# Patient Record
Sex: FEMALE | Race: WHITE | Employment: OTHER | URBAN - METROPOLITAN AREA
[De-identification: names, ages, dates, MRNs, and addresses within clinical notes are randomized per-mention and may not be internally consistent; named-entity substitution may affect disease eponyms.]

---

## 2017-01-01 ENCOUNTER — HOSPITAL ENCOUNTER (OUTPATIENT)
Dept: MRI IMAGING | Age: 37
Discharge: HOME | End: 2017-10-05
Attending: INTERNAL MEDICINE
Payer: MEDICARE

## 2017-01-01 DIAGNOSIS — R26.9 GAIT DISTURBANCE: ICD-10-CM

## 2017-01-01 DIAGNOSIS — Z98.2 VP (VENTRICULOPERITONEAL) SHUNT STATUS: ICD-10-CM

## 2017-01-01 DIAGNOSIS — G45.9 TRANSIENT CEREBRAL ISCHEMIA, UNSPECIFIED TYPE: ICD-10-CM

## 2017-01-01 PROCEDURE — A9577 INJ MULTIHANCE: HCPCS | Performed by: INTERNAL MEDICINE

## 2017-01-01 PROCEDURE — 74011250636 HC RX REV CODE- 250/636: Performed by: INTERNAL MEDICINE

## 2017-01-01 PROCEDURE — 70553 MRI BRAIN STEM W/O & W/DYE: CPT

## 2017-01-01 RX ORDER — SODIUM CHLORIDE 0.9 % (FLUSH) 0.9 %
10 SYRINGE (ML) INJECTION
Status: COMPLETED | OUTPATIENT
Start: 2017-01-01 | End: 2017-01-01

## 2017-01-01 RX ADMIN — GADOBENATE DIMEGLUMINE 15 ML: 529 INJECTION, SOLUTION INTRAVENOUS at 17:05

## 2017-01-01 RX ADMIN — Medication 10 ML: at 17:05

## 2017-02-07 ENCOUNTER — HOSPITAL ENCOUNTER (OUTPATIENT)
Dept: LAB | Age: 37
Discharge: HOME | End: 2017-02-07

## 2017-02-07 PROCEDURE — 88305 TISSUE EXAM BY PATHOLOGIST: CPT | Performed by: INTERNAL MEDICINE

## 2017-02-07 PROCEDURE — 88312 SPECIAL STAINS GROUP 1: CPT | Performed by: INTERNAL MEDICINE

## 2017-02-10 PROBLEM — K21.00 GERD WITH ESOPHAGITIS: Status: ACTIVE | Noted: 2017-02-10

## 2017-03-02 PROBLEM — R55 VASOVAGAL SYNCOPE: Status: ACTIVE | Noted: 2017-03-02

## 2017-03-02 PROBLEM — E03.8 SECONDARY HYPOTHYROIDISM: Status: ACTIVE | Noted: 2017-03-02

## 2017-05-04 ENCOUNTER — HOSPITAL ENCOUNTER (OUTPATIENT)
Dept: LAB | Age: 37
Discharge: HOME | End: 2017-05-04

## 2017-05-04 PROCEDURE — 88305 TISSUE EXAM BY PATHOLOGIST: CPT | Performed by: INTERNAL MEDICINE

## 2017-05-05 PROBLEM — K20.90 ESOPHAGITIS: Status: ACTIVE | Noted: 2017-05-04

## 2017-08-01 ENCOUNTER — HOSPITAL ENCOUNTER (OUTPATIENT)
Dept: ULTRASOUND IMAGING | Age: 37
Discharge: HOME | End: 2017-08-01
Attending: INTERNAL MEDICINE
Payer: MEDICARE

## 2017-08-01 DIAGNOSIS — M79.605 LEFT LEG PAIN: ICD-10-CM

## 2017-08-01 PROCEDURE — 93971 EXTREMITY STUDY: CPT

## 2017-11-15 PROBLEM — R73.9 ELEVATED BLOOD SUGAR: Status: ACTIVE | Noted: 2017-01-01

## 2017-11-15 PROBLEM — E66.9 OBESITY: Status: ACTIVE | Noted: 2017-01-01

## 2018-01-01 ENCOUNTER — HOSPITAL ENCOUNTER (OUTPATIENT)
Dept: MEDSURG UNIT | Age: 38
Discharge: HOME | End: 2018-06-22
Payer: MEDICARE

## 2018-01-01 ENCOUNTER — APPOINTMENT (OUTPATIENT)
Dept: ULTRASOUND IMAGING | Age: 38
End: 2018-01-01
Attending: EMERGENCY MEDICINE
Payer: MEDICARE

## 2018-01-01 ENCOUNTER — HOME CARE VISIT (OUTPATIENT)
Dept: SCHEDULING | Facility: HOME HEALTH | Age: 38
End: 2018-01-01
Payer: MEDICARE

## 2018-01-01 ENCOUNTER — ANESTHESIA EVENT (OUTPATIENT)
Dept: ENDOSCOPY | Age: 38
End: 2018-01-01
Payer: MEDICARE

## 2018-01-01 ENCOUNTER — APPOINTMENT (OUTPATIENT)
Dept: MRI IMAGING | Age: 38
DRG: 374 | End: 2018-01-01
Attending: EMERGENCY MEDICINE
Payer: MEDICARE

## 2018-01-01 ENCOUNTER — HOSPITAL ENCOUNTER (OUTPATIENT)
Dept: NUCLEAR MEDICINE | Age: 38
Discharge: HOME | End: 2018-05-31
Payer: MEDICARE

## 2018-01-01 ENCOUNTER — HOME CARE VISIT (OUTPATIENT)
Dept: HOSPICE | Facility: HOSPICE | Age: 38
End: 2018-01-01
Payer: MEDICARE

## 2018-01-01 ENCOUNTER — ANESTHESIA (OUTPATIENT)
Dept: ENDOSCOPY | Age: 38
End: 2018-01-01
Payer: MEDICARE

## 2018-01-01 ENCOUNTER — HOSPICE ADMISSION (OUTPATIENT)
Dept: HOSPICE | Facility: HOSPICE | Age: 38
End: 2018-01-01
Payer: MEDICARE

## 2018-01-01 ENCOUNTER — ANESTHESIA EVENT (OUTPATIENT)
Dept: ENDOSCOPY | Age: 38
DRG: 374 | End: 2018-01-01
Payer: MEDICARE

## 2018-01-01 ENCOUNTER — APPOINTMENT (OUTPATIENT)
Dept: GENERAL RADIOLOGY | Age: 38
End: 2018-01-01
Attending: EMERGENCY MEDICINE
Payer: MEDICARE

## 2018-01-01 ENCOUNTER — HOSPITAL ENCOUNTER (OUTPATIENT)
Age: 38
Setting detail: OBSERVATION
Discharge: HOME | End: 2018-06-03
Attending: EMERGENCY MEDICINE | Admitting: INTERNAL MEDICINE
Payer: MEDICARE

## 2018-01-01 ENCOUNTER — ANESTHESIA (OUTPATIENT)
Dept: ENDOSCOPY | Age: 38
DRG: 374 | End: 2018-01-01
Payer: MEDICARE

## 2018-01-01 ENCOUNTER — APPOINTMENT (OUTPATIENT)
Dept: GENERAL RADIOLOGY | Age: 38
DRG: 374 | End: 2018-01-01
Attending: INTERNAL MEDICINE
Payer: MEDICARE

## 2018-01-01 ENCOUNTER — HOSPITAL ENCOUNTER (OUTPATIENT)
Dept: ULTRASOUND IMAGING | Age: 38
Discharge: HOME | End: 2018-06-20
Payer: MEDICARE

## 2018-01-01 ENCOUNTER — APPOINTMENT (OUTPATIENT)
Dept: CT IMAGING | Age: 38
End: 2018-01-01
Attending: EMERGENCY MEDICINE
Payer: MEDICARE

## 2018-01-01 ENCOUNTER — PATIENT OUTREACH (OUTPATIENT)
Dept: CASE MANAGEMENT | Age: 38
End: 2018-01-01

## 2018-01-01 ENCOUNTER — HOSPITAL ENCOUNTER (EMERGENCY)
Age: 38
Discharge: HOME | End: 2018-07-10
Attending: EMERGENCY MEDICINE
Payer: MEDICARE

## 2018-01-01 ENCOUNTER — APPOINTMENT (OUTPATIENT)
Dept: CT IMAGING | Age: 38
DRG: 374 | End: 2018-01-01
Attending: NURSE PRACTITIONER
Payer: MEDICARE

## 2018-01-01 ENCOUNTER — HOSPITAL ENCOUNTER (INPATIENT)
Age: 38
LOS: 6 days | Discharge: HOME | DRG: 374 | End: 2018-06-15
Attending: EMERGENCY MEDICINE | Admitting: INTERNAL MEDICINE
Payer: MEDICARE

## 2018-01-01 ENCOUNTER — HOSPITAL ENCOUNTER (OUTPATIENT)
Dept: ULTRASOUND IMAGING | Age: 38
Discharge: HOME | End: 2018-04-26
Attending: INTERNAL MEDICINE
Payer: MEDICARE

## 2018-01-01 VITALS
OXYGEN SATURATION: 96 % | HEART RATE: 110 BPM | RESPIRATION RATE: 20 BRPM | SYSTOLIC BLOOD PRESSURE: 145 MMHG | WEIGHT: 161 LBS | BODY MASS INDEX: 31.61 KG/M2 | DIASTOLIC BLOOD PRESSURE: 80 MMHG | TEMPERATURE: 99.8 F | HEIGHT: 60 IN

## 2018-01-01 VITALS
WEIGHT: 146 LBS | BODY MASS INDEX: 27.56 KG/M2 | TEMPERATURE: 98 F | SYSTOLIC BLOOD PRESSURE: 179 MMHG | HEIGHT: 61 IN | OXYGEN SATURATION: 98 % | DIASTOLIC BLOOD PRESSURE: 78 MMHG | RESPIRATION RATE: 16 BRPM | HEART RATE: 100 BPM

## 2018-01-01 VITALS
SYSTOLIC BLOOD PRESSURE: 128 MMHG | WEIGHT: 144 LBS | BODY MASS INDEX: 27.19 KG/M2 | HEART RATE: 116 BPM | HEIGHT: 61 IN | DIASTOLIC BLOOD PRESSURE: 76 MMHG | OXYGEN SATURATION: 98 % | RESPIRATION RATE: 24 BRPM | TEMPERATURE: 98.1 F

## 2018-01-01 VITALS — RESPIRATION RATE: 28 BRPM | DIASTOLIC BLOOD PRESSURE: 58 MMHG | HEART RATE: 118 BPM | SYSTOLIC BLOOD PRESSURE: 128 MMHG

## 2018-01-01 VITALS — DIASTOLIC BLOOD PRESSURE: 70 MMHG | SYSTOLIC BLOOD PRESSURE: 110 MMHG | RESPIRATION RATE: 12 BRPM | HEART RATE: 120 BPM

## 2018-01-01 VITALS
OXYGEN SATURATION: 93 % | BODY MASS INDEX: 30.38 KG/M2 | TEMPERATURE: 98.7 F | RESPIRATION RATE: 18 BRPM | WEIGHT: 160.8 LBS | DIASTOLIC BLOOD PRESSURE: 68 MMHG | SYSTOLIC BLOOD PRESSURE: 137 MMHG | HEART RATE: 104 BPM

## 2018-01-01 VITALS — DIASTOLIC BLOOD PRESSURE: 52 MMHG | SYSTOLIC BLOOD PRESSURE: 100 MMHG | HEART RATE: 120 BPM | RESPIRATION RATE: 16 BRPM

## 2018-01-01 VITALS — SYSTOLIC BLOOD PRESSURE: 140 MMHG | DIASTOLIC BLOOD PRESSURE: 72 MMHG | HEART RATE: 114 BPM | RESPIRATION RATE: 12 BRPM

## 2018-01-01 VITALS
HEART RATE: 108 BPM | RESPIRATION RATE: 25 BRPM | TEMPERATURE: 97.6 F | DIASTOLIC BLOOD PRESSURE: 48 MMHG | SYSTOLIC BLOOD PRESSURE: 80 MMHG

## 2018-01-01 VITALS — HEART RATE: 104 BPM | SYSTOLIC BLOOD PRESSURE: 140 MMHG | RESPIRATION RATE: 12 BRPM | DIASTOLIC BLOOD PRESSURE: 70 MMHG

## 2018-01-01 VITALS — HEART RATE: 80 BPM | SYSTOLIC BLOOD PRESSURE: 160 MMHG | DIASTOLIC BLOOD PRESSURE: 80 MMHG | RESPIRATION RATE: 20 BRPM

## 2018-01-01 VITALS
DIASTOLIC BLOOD PRESSURE: 92 MMHG | HEART RATE: 109 BPM | RESPIRATION RATE: 22 BRPM | TEMPERATURE: 99 F | SYSTOLIC BLOOD PRESSURE: 140 MMHG

## 2018-01-01 VITALS
TEMPERATURE: 97.8 F | HEART RATE: 104 BPM | SYSTOLIC BLOOD PRESSURE: 130 MMHG | RESPIRATION RATE: 18 BRPM | DIASTOLIC BLOOD PRESSURE: 82 MMHG

## 2018-01-01 DIAGNOSIS — R97.0 ELEVATED CEA: ICD-10-CM

## 2018-01-01 DIAGNOSIS — C18.9 METASTATIC COLON CANCER IN FEMALE (HCC): ICD-10-CM

## 2018-01-01 DIAGNOSIS — R79.89 ABNORMAL LIVER FUNCTION TESTS: ICD-10-CM

## 2018-01-01 DIAGNOSIS — F42.9 OBSESSIVE-COMPULSIVE DISORDER, UNSPECIFIED TYPE: ICD-10-CM

## 2018-01-01 DIAGNOSIS — R10.11 RIGHT UPPER QUADRANT PAIN: ICD-10-CM

## 2018-01-01 DIAGNOSIS — C78.7 LIVER METASTASES (HCC): ICD-10-CM

## 2018-01-01 DIAGNOSIS — R74.8 ABNORMAL LIVER ENZYMES: ICD-10-CM

## 2018-01-01 DIAGNOSIS — M79.601 PAIN OF RIGHT UPPER EXTREMITY: ICD-10-CM

## 2018-01-01 DIAGNOSIS — F20.0 PARANOID SCHIZOPHRENIA (HCC): ICD-10-CM

## 2018-01-01 DIAGNOSIS — F81.9 COGNITIVE DEVELOPMENTAL DELAY: Chronic | ICD-10-CM

## 2018-01-01 DIAGNOSIS — Z98.2 VP (VENTRICULOPERITONEAL) SHUNT STATUS: Chronic | ICD-10-CM

## 2018-01-01 DIAGNOSIS — R17 JAUNDICE: Primary | ICD-10-CM

## 2018-01-01 DIAGNOSIS — C18.7 MALIGNANT NEOPLASM OF SIGMOID COLON (HCC): Primary | ICD-10-CM

## 2018-01-01 DIAGNOSIS — F79 INTELLECTUAL DISABILITY: Chronic | ICD-10-CM

## 2018-01-01 DIAGNOSIS — K31.84 GASTROPARESIS: ICD-10-CM

## 2018-01-01 DIAGNOSIS — K75.89 CHOLESTATIC HEPATITIS: ICD-10-CM

## 2018-01-01 DIAGNOSIS — K76.0 FATTY LIVER: ICD-10-CM

## 2018-01-01 DIAGNOSIS — E66.9 OBESITY, UNSPECIFIED CLASSIFICATION, UNSPECIFIED OBESITY TYPE, UNSPECIFIED WHETHER SERIOUS COMORBIDITY PRESENT: ICD-10-CM

## 2018-01-01 DIAGNOSIS — R14.2 BELCHING: ICD-10-CM

## 2018-01-01 DIAGNOSIS — R79.89 ELEVATED LIVER FUNCTION TESTS: ICD-10-CM

## 2018-01-01 DIAGNOSIS — R17 ELEVATED BILIRUBIN: Primary | ICD-10-CM

## 2018-01-01 DIAGNOSIS — R10.11 ABDOMINAL PAIN, RIGHT UPPER QUADRANT: ICD-10-CM

## 2018-01-01 LAB
ABO + RH BLD: NORMAL
ALBUMIN SERPL-MCNC: 1.9 G/DL (ref 3.5–5)
ALBUMIN SERPL-MCNC: 2 G/DL (ref 3.5–5)
ALBUMIN SERPL-MCNC: 2.1 G/DL (ref 3.5–5)
ALBUMIN SERPL-MCNC: 2.3 G/DL (ref 3.5–5)
ALBUMIN SERPL-MCNC: 2.4 G/DL (ref 3.5–5)
ALBUMIN SERPL-MCNC: 2.4 G/DL (ref 3.5–5)
ALBUMIN SERPL-MCNC: 2.5 G/DL (ref 3.5–5)
ALBUMIN SERPL-MCNC: 2.6 G/DL (ref 3.5–5)
ALBUMIN SERPL-MCNC: 3.2 G/DL (ref 3.5–5)
ALBUMIN/GLOB SERPL: 0.3 {RATIO} (ref 1.2–3.5)
ALBUMIN/GLOB SERPL: 0.4 {RATIO} (ref 1.2–3.5)
ALBUMIN/GLOB SERPL: 0.5 {RATIO} (ref 1.2–3.5)
ALBUMIN/GLOB SERPL: 0.6 {RATIO} (ref 1.2–3.5)
ALP SERPL-CCNC: 490 U/L (ref 50–136)
ALP SERPL-CCNC: 564 U/L (ref 50–136)
ALP SERPL-CCNC: 610 U/L (ref 50–136)
ALP SERPL-CCNC: 737 U/L (ref 50–136)
ALP SERPL-CCNC: 749 U/L (ref 50–136)
ALP SERPL-CCNC: 773 U/L (ref 50–136)
ALP SERPL-CCNC: 823 U/L (ref 50–136)
ALP SERPL-CCNC: 895 U/L (ref 50–136)
ALP SERPL-CCNC: 918 U/L (ref 50–136)
ALP SERPL-CCNC: 962 U/L (ref 50–136)
ALP SERPL-CCNC: 975 U/L (ref 50–136)
ALT SERPL-CCNC: 117 U/L (ref 12–65)
ALT SERPL-CCNC: 213 U/L (ref 12–65)
ALT SERPL-CCNC: 233 U/L (ref 12–65)
ALT SERPL-CCNC: 250 U/L (ref 12–65)
ALT SERPL-CCNC: 305 U/L (ref 12–65)
ALT SERPL-CCNC: 307 U/L (ref 12–65)
ALT SERPL-CCNC: 323 U/L (ref 12–65)
ALT SERPL-CCNC: 326 U/L (ref 12–65)
ALT SERPL-CCNC: 364 U/L (ref 12–65)
ALT SERPL-CCNC: 369 U/L (ref 12–65)
ALT SERPL-CCNC: 395 U/L (ref 12–65)
ANION GAP SERPL CALC-SCNC: 10 MMOL/L (ref 7–16)
ANION GAP SERPL CALC-SCNC: 10 MMOL/L (ref 7–16)
ANION GAP SERPL CALC-SCNC: 11 MMOL/L (ref 7–16)
ANION GAP SERPL CALC-SCNC: 12 MMOL/L (ref 7–16)
ANION GAP SERPL CALC-SCNC: 12 MMOL/L (ref 7–16)
ANION GAP SERPL CALC-SCNC: 13 MMOL/L (ref 7–16)
ANION GAP SERPL CALC-SCNC: 14 MMOL/L (ref 7–16)
ANION GAP SERPL CALC-SCNC: 9 MMOL/L (ref 7–16)
APAP SERPL-MCNC: <2 UG/ML (ref 10–30)
APPEARANCE UR: CLEAR
AST SERPL-CCNC: 142 U/L (ref 15–37)
AST SERPL-CCNC: 144 U/L (ref 15–37)
AST SERPL-CCNC: 148 U/L (ref 15–37)
AST SERPL-CCNC: 155 U/L (ref 15–37)
AST SERPL-CCNC: 179 U/L (ref 15–37)
AST SERPL-CCNC: 281 U/L (ref 15–37)
AST SERPL-CCNC: 282 U/L (ref 15–37)
AST SERPL-CCNC: 303 U/L (ref 15–37)
AST SERPL-CCNC: 304 U/L (ref 15–37)
AST SERPL-CCNC: 315 U/L (ref 15–37)
AST SERPL-CCNC: 403 U/L (ref 15–37)
ATRIAL RATE: 131 BPM
BACTERIA SPEC CULT: NORMAL
BACTERIA SPEC CULT: NORMAL
BACTERIA URNS QL MICRO: 0 /HPF
BASOPHILS # BLD: 0 K/UL (ref 0–0.2)
BASOPHILS # BLD: 0.1 K/UL (ref 0–0.2)
BASOPHILS NFR BLD: 0 % (ref 0–2)
BASOPHILS NFR BLD: 1 % (ref 0–2)
BASOPHILS NFR BLD: 2 % (ref 0–2)
BILIRUB DIRECT SERPL-MCNC: 4.7 MG/DL
BILIRUB DIRECT SERPL-MCNC: 6 MG/DL
BILIRUB DIRECT SERPL-MCNC: 7.1 MG/DL
BILIRUB DIRECT SERPL-MCNC: 7.4 MG/DL
BILIRUB SERPL-MCNC: 10.5 MG/DL (ref 0.2–1.1)
BILIRUB SERPL-MCNC: 3.1 MG/DL (ref 0.2–1.1)
BILIRUB SERPL-MCNC: 3.5 MG/DL (ref 0.2–1.1)
BILIRUB SERPL-MCNC: 3.8 MG/DL (ref 0.2–1.1)
BILIRUB SERPL-MCNC: 3.9 MG/DL (ref 0.2–1.1)
BILIRUB SERPL-MCNC: 4.6 MG/DL (ref 0.2–1.1)
BILIRUB SERPL-MCNC: 4.6 MG/DL (ref 0.2–1.1)
BILIRUB SERPL-MCNC: 5.4 MG/DL (ref 0.2–1.1)
BILIRUB SERPL-MCNC: 6.8 MG/DL (ref 0.2–1.1)
BILIRUB SERPL-MCNC: 8.2 MG/DL (ref 0.2–1.1)
BILIRUB SERPL-MCNC: 8.5 MG/DL (ref 0.2–1.1)
BILIRUB UR QL: ABNORMAL
BLD PROD TYP BPU: NORMAL
BLD PROD TYP BPU: NORMAL
BLOOD GROUP ANTIBODIES SERPL: NORMAL
BPU ID: NORMAL
BPU ID: NORMAL
BUN SERPL-MCNC: 10 MG/DL (ref 6–23)
BUN SERPL-MCNC: 10 MG/DL (ref 6–23)
BUN SERPL-MCNC: 12 MG/DL (ref 6–23)
BUN SERPL-MCNC: 14 MG/DL (ref 6–23)
BUN SERPL-MCNC: 20 MG/DL (ref 6–23)
BUN SERPL-MCNC: 27 MG/DL (ref 6–23)
BUN SERPL-MCNC: 5 MG/DL (ref 6–23)
BUN SERPL-MCNC: 6 MG/DL (ref 6–23)
BUN SERPL-MCNC: 8 MG/DL (ref 6–23)
BUN SERPL-MCNC: 9 MG/DL (ref 6–23)
CALCIUM SERPL-MCNC: 7.7 MG/DL (ref 8.3–10.4)
CALCIUM SERPL-MCNC: 7.9 MG/DL (ref 8.3–10.4)
CALCIUM SERPL-MCNC: 8.2 MG/DL (ref 8.3–10.4)
CALCIUM SERPL-MCNC: 8.5 MG/DL (ref 8.3–10.4)
CALCIUM SERPL-MCNC: 8.6 MG/DL (ref 8.3–10.4)
CALCIUM SERPL-MCNC: 8.6 MG/DL (ref 8.3–10.4)
CALCIUM SERPL-MCNC: 8.7 MG/DL (ref 8.3–10.4)
CALCIUM SERPL-MCNC: 8.7 MG/DL (ref 8.3–10.4)
CALCIUM SERPL-MCNC: 9 MG/DL (ref 8.3–10.4)
CALCIUM SERPL-MCNC: 9 MG/DL (ref 8.3–10.4)
CALCULATED P AXIS, ECG09: 69 DEGREES
CALCULATED R AXIS, ECG10: 85 DEGREES
CALCULATED T AXIS, ECG11: 39 DEGREES
CANCER AG19-9 SERPL-ACNC: 1316.4 U/ML (ref 2–37)
CASTS URNS QL MICRO: ABNORMAL /LPF
CEA SERPL-MCNC: 25.8 NG/ML (ref 0–3)
CHLORIDE SERPL-SCNC: 100 MMOL/L (ref 98–107)
CHLORIDE SERPL-SCNC: 101 MMOL/L (ref 98–107)
CHLORIDE SERPL-SCNC: 101 MMOL/L (ref 98–107)
CHLORIDE SERPL-SCNC: 103 MMOL/L (ref 98–107)
CHLORIDE SERPL-SCNC: 104 MMOL/L (ref 98–107)
CHLORIDE SERPL-SCNC: 104 MMOL/L (ref 98–107)
CHLORIDE SERPL-SCNC: 105 MMOL/L (ref 98–107)
CHLORIDE SERPL-SCNC: 105 MMOL/L (ref 98–107)
CHLORIDE SERPL-SCNC: 106 MMOL/L (ref 98–107)
CHLORIDE SERPL-SCNC: 99 MMOL/L (ref 98–107)
CO2 SERPL-SCNC: 23 MMOL/L (ref 21–32)
CO2 SERPL-SCNC: 24 MMOL/L (ref 21–32)
CO2 SERPL-SCNC: 25 MMOL/L (ref 21–32)
CO2 SERPL-SCNC: 25 MMOL/L (ref 21–32)
CO2 SERPL-SCNC: 26 MMOL/L (ref 21–32)
CO2 SERPL-SCNC: 27 MMOL/L (ref 21–32)
COLOR UR: ABNORMAL
CREAT SERPL-MCNC: 0.55 MG/DL (ref 0.6–1)
CREAT SERPL-MCNC: 0.59 MG/DL (ref 0.6–1)
CREAT SERPL-MCNC: 0.61 MG/DL (ref 0.6–1)
CREAT SERPL-MCNC: 0.66 MG/DL (ref 0.6–1)
CREAT SERPL-MCNC: 0.67 MG/DL (ref 0.6–1)
CREAT SERPL-MCNC: 0.68 MG/DL (ref 0.6–1)
CREAT SERPL-MCNC: 0.76 MG/DL (ref 0.6–1)
CREAT SERPL-MCNC: 0.92 MG/DL (ref 0.6–1)
CROSSMATCH RESULT,%XM: NORMAL
CROSSMATCH RESULT,%XM: NORMAL
DIAGNOSIS, 93000: NORMAL
DIFFERENTIAL METHOD BLD: ABNORMAL
EOSINOPHIL # BLD: 0 K/UL (ref 0–0.8)
EOSINOPHIL # BLD: 0.1 K/UL (ref 0–0.8)
EOSINOPHIL # BLD: 0.2 K/UL (ref 0–0.8)
EOSINOPHIL # BLD: 0.4 K/UL (ref 0–0.8)
EOSINOPHIL # BLD: 0.5 K/UL (ref 0–0.8)
EOSINOPHIL NFR BLD: 0 % (ref 0.5–7.8)
EOSINOPHIL NFR BLD: 1 % (ref 0.5–7.8)
EOSINOPHIL NFR BLD: 2 % (ref 0.5–7.8)
EOSINOPHIL NFR BLD: 3 % (ref 0.5–7.8)
EOSINOPHIL NFR BLD: 4 % (ref 0.5–7.8)
EOSINOPHIL NFR BLD: 4 % (ref 0.5–7.8)
EOSINOPHIL NFR BLD: 5 % (ref 0.5–7.8)
EOSINOPHIL NFR BLD: 5 % (ref 0.5–7.8)
EOSINOPHIL NFR BLD: 6 % (ref 0.5–7.8)
EPI CELLS #/AREA URNS HPF: 0 /HPF
ERYTHROCYTE [DISTWIDTH] IN BLOOD BY AUTOMATED COUNT: 16.2 % (ref 11.9–14.6)
ERYTHROCYTE [DISTWIDTH] IN BLOOD BY AUTOMATED COUNT: 16.3 % (ref 11.9–14.6)
ERYTHROCYTE [DISTWIDTH] IN BLOOD BY AUTOMATED COUNT: 16.5 % (ref 11.9–14.6)
ERYTHROCYTE [DISTWIDTH] IN BLOOD BY AUTOMATED COUNT: 16.8 % (ref 11.9–14.6)
ERYTHROCYTE [DISTWIDTH] IN BLOOD BY AUTOMATED COUNT: 18.3 % (ref 11.9–14.6)
ERYTHROCYTE [DISTWIDTH] IN BLOOD BY AUTOMATED COUNT: 19.3 % (ref 11.9–14.6)
ERYTHROCYTE [DISTWIDTH] IN BLOOD BY AUTOMATED COUNT: 19.6 % (ref 11.9–14.6)
ERYTHROCYTE [DISTWIDTH] IN BLOOD BY AUTOMATED COUNT: 19.9 % (ref 11.9–14.6)
ERYTHROCYTE [DISTWIDTH] IN BLOOD BY AUTOMATED COUNT: 20.1 % (ref 11.9–14.6)
ERYTHROCYTE [DISTWIDTH] IN BLOOD BY AUTOMATED COUNT: 20.4 % (ref 11.9–14.6)
EST. AVERAGE GLUCOSE BLD GHB EST-MCNC: 123 MG/DL
FOLATE SERPL-MCNC: 49.5 NG/ML (ref 3.1–17.5)
GLOBULIN SER CALC-MCNC: 4.2 G/DL (ref 2.3–3.5)
GLOBULIN SER CALC-MCNC: 4.2 G/DL (ref 2.3–3.5)
GLOBULIN SER CALC-MCNC: 4.7 G/DL (ref 2.3–3.5)
GLOBULIN SER CALC-MCNC: 4.8 G/DL (ref 2.3–3.5)
GLOBULIN SER CALC-MCNC: 4.9 G/DL (ref 2.3–3.5)
GLOBULIN SER CALC-MCNC: 5 G/DL (ref 2.3–3.5)
GLOBULIN SER CALC-MCNC: 5.3 G/DL (ref 2.3–3.5)
GLOBULIN SER CALC-MCNC: 6.1 G/DL (ref 2.3–3.5)
GLOBULIN SER CALC-MCNC: 6.4 G/DL (ref 2.3–3.5)
GLUCOSE BLD STRIP.AUTO-MCNC: 100 MG/DL (ref 65–100)
GLUCOSE BLD STRIP.AUTO-MCNC: 102 MG/DL (ref 65–100)
GLUCOSE BLD STRIP.AUTO-MCNC: 104 MG/DL (ref 65–100)
GLUCOSE BLD STRIP.AUTO-MCNC: 105 MG/DL (ref 65–100)
GLUCOSE BLD STRIP.AUTO-MCNC: 106 MG/DL (ref 65–100)
GLUCOSE BLD STRIP.AUTO-MCNC: 106 MG/DL (ref 65–100)
GLUCOSE BLD STRIP.AUTO-MCNC: 107 MG/DL (ref 65–100)
GLUCOSE BLD STRIP.AUTO-MCNC: 107 MG/DL (ref 65–100)
GLUCOSE BLD STRIP.AUTO-MCNC: 108 MG/DL (ref 65–100)
GLUCOSE BLD STRIP.AUTO-MCNC: 108 MG/DL (ref 65–100)
GLUCOSE BLD STRIP.AUTO-MCNC: 109 MG/DL (ref 65–100)
GLUCOSE BLD STRIP.AUTO-MCNC: 112 MG/DL (ref 65–100)
GLUCOSE BLD STRIP.AUTO-MCNC: 112 MG/DL (ref 65–100)
GLUCOSE BLD STRIP.AUTO-MCNC: 115 MG/DL (ref 65–100)
GLUCOSE BLD STRIP.AUTO-MCNC: 116 MG/DL (ref 65–100)
GLUCOSE BLD STRIP.AUTO-MCNC: 119 MG/DL (ref 65–100)
GLUCOSE BLD STRIP.AUTO-MCNC: 119 MG/DL (ref 65–100)
GLUCOSE BLD STRIP.AUTO-MCNC: 120 MG/DL (ref 65–100)
GLUCOSE BLD STRIP.AUTO-MCNC: 122 MG/DL (ref 65–100)
GLUCOSE BLD STRIP.AUTO-MCNC: 123 MG/DL (ref 65–100)
GLUCOSE BLD STRIP.AUTO-MCNC: 125 MG/DL (ref 65–100)
GLUCOSE BLD STRIP.AUTO-MCNC: 125 MG/DL (ref 65–100)
GLUCOSE BLD STRIP.AUTO-MCNC: 133 MG/DL (ref 65–100)
GLUCOSE BLD STRIP.AUTO-MCNC: 139 MG/DL (ref 65–100)
GLUCOSE BLD STRIP.AUTO-MCNC: 87 MG/DL (ref 65–100)
GLUCOSE BLD STRIP.AUTO-MCNC: 89 MG/DL (ref 65–100)
GLUCOSE BLD STRIP.AUTO-MCNC: 89 MG/DL (ref 65–100)
GLUCOSE BLD STRIP.AUTO-MCNC: 92 MG/DL (ref 65–100)
GLUCOSE BLD STRIP.AUTO-MCNC: 94 MG/DL (ref 65–100)
GLUCOSE BLD STRIP.AUTO-MCNC: 96 MG/DL (ref 65–100)
GLUCOSE BLD STRIP.AUTO-MCNC: 97 MG/DL (ref 65–100)
GLUCOSE SERPL-MCNC: 103 MG/DL (ref 65–100)
GLUCOSE SERPL-MCNC: 104 MG/DL (ref 65–100)
GLUCOSE SERPL-MCNC: 105 MG/DL (ref 65–100)
GLUCOSE SERPL-MCNC: 107 MG/DL (ref 65–100)
GLUCOSE SERPL-MCNC: 111 MG/DL (ref 65–100)
GLUCOSE SERPL-MCNC: 129 MG/DL (ref 65–100)
GLUCOSE SERPL-MCNC: 167 MG/DL (ref 65–100)
GLUCOSE SERPL-MCNC: 91 MG/DL (ref 65–100)
GLUCOSE SERPL-MCNC: 92 MG/DL (ref 65–100)
GLUCOSE SERPL-MCNC: 95 MG/DL (ref 65–100)
GLUCOSE UR STRIP.AUTO-MCNC: NEGATIVE MG/DL
HBA1C MFR BLD: 5.9 % (ref 4.8–6)
HCT VFR BLD AUTO: 23 % (ref 35.8–46.3)
HCT VFR BLD AUTO: 23.5 % (ref 35.8–46.3)
HCT VFR BLD AUTO: 25.2 % (ref 35.8–46.3)
HCT VFR BLD AUTO: 27.4 % (ref 35.8–46.3)
HCT VFR BLD AUTO: 27.8 % (ref 35.8–46.3)
HCT VFR BLD AUTO: 28.5 % (ref 35.8–46.3)
HCT VFR BLD AUTO: 28.6 % (ref 35.8–46.3)
HCT VFR BLD AUTO: 30.4 % (ref 35.8–46.3)
HCT VFR BLD AUTO: 30.4 % (ref 35.8–46.3)
HCT VFR BLD AUTO: 30.7 % (ref 35.8–46.3)
HCT VFR BLD AUTO: 34.2 % (ref 35.8–46.3)
HCT VFR BLD AUTO: 36.1 % (ref 35.8–46.3)
HCT VFR BLD AUTO: 37.4 % (ref 35.8–46.3)
HEMOCCULT STL QL: POSITIVE
HGB BLD-MCNC: 10.1 G/DL (ref 11.7–15.4)
HGB BLD-MCNC: 10.1 G/DL (ref 11.7–15.4)
HGB BLD-MCNC: 11.4 G/DL (ref 11.7–15.4)
HGB BLD-MCNC: 12.5 G/DL (ref 11.7–15.4)
HGB BLD-MCNC: 12.7 G/DL (ref 11.7–15.4)
HGB BLD-MCNC: 7.4 G/DL (ref 11.7–15.4)
HGB BLD-MCNC: 7.4 G/DL (ref 11.7–15.4)
HGB BLD-MCNC: 8 G/DL (ref 11.7–15.4)
HGB BLD-MCNC: 8.8 G/DL (ref 11.7–15.4)
HGB BLD-MCNC: 8.8 G/DL (ref 11.7–15.4)
HGB BLD-MCNC: 9 G/DL (ref 11.7–15.4)
HGB BLD-MCNC: 9.3 G/DL (ref 11.7–15.4)
HGB BLD-MCNC: 9.9 G/DL (ref 11.7–15.4)
HGB UR QL STRIP: NEGATIVE
IMM GRANULOCYTES # BLD: 0.1 K/UL (ref 0–0.5)
IMM GRANULOCYTES # BLD: 0.2 K/UL (ref 0–0.5)
IMM GRANULOCYTES NFR BLD AUTO: 1 % (ref 0–5)
IMM GRANULOCYTES NFR BLD AUTO: 2 % (ref 0–5)
INR PPP: 1.2
INR PPP: 1.4
KETONES UR QL STRIP.AUTO: NEGATIVE MG/DL
LEUKOCYTE ESTERASE UR QL STRIP.AUTO: ABNORMAL
LIPASE SERPL-CCNC: 41 U/L (ref 73–393)
LIPASE SERPL-CCNC: 42 U/L (ref 73–393)
LIPASE SERPL-CCNC: 51 U/L (ref 73–393)
LYMPHOCYTES # BLD: 2 K/UL (ref 0.5–4.6)
LYMPHOCYTES # BLD: 2.1 K/UL (ref 0.5–4.6)
LYMPHOCYTES # BLD: 2.3 K/UL (ref 0.5–4.6)
LYMPHOCYTES # BLD: 2.4 K/UL (ref 0.5–4.6)
LYMPHOCYTES # BLD: 2.5 K/UL (ref 0.5–4.6)
LYMPHOCYTES # BLD: 2.7 K/UL (ref 0.5–4.6)
LYMPHOCYTES # BLD: 2.9 K/UL (ref 0.5–4.6)
LYMPHOCYTES # BLD: 2.9 K/UL (ref 0.5–4.6)
LYMPHOCYTES # BLD: 3.6 K/UL (ref 0.5–4.6)
LYMPHOCYTES NFR BLD: 20 % (ref 13–44)
LYMPHOCYTES NFR BLD: 21 % (ref 13–44)
LYMPHOCYTES NFR BLD: 24 % (ref 13–44)
LYMPHOCYTES NFR BLD: 25 % (ref 13–44)
LYMPHOCYTES NFR BLD: 26 % (ref 13–44)
LYMPHOCYTES NFR BLD: 28 % (ref 13–44)
LYMPHOCYTES NFR BLD: 32 % (ref 13–44)
LYMPHOCYTES NFR BLD: 34 % (ref 13–44)
LYMPHOCYTES NFR BLD: 42 % (ref 13–44)
MAGNESIUM SERPL-MCNC: 1.7 MG/DL (ref 1.8–2.4)
MAGNESIUM SERPL-MCNC: 1.9 MG/DL (ref 1.8–2.4)
MAGNESIUM SERPL-MCNC: 2.5 MG/DL (ref 1.8–2.4)
MCH RBC QN AUTO: 25.7 PG (ref 26.1–32.9)
MCH RBC QN AUTO: 25.7 PG (ref 26.1–32.9)
MCH RBC QN AUTO: 26.1 PG (ref 26.1–32.9)
MCH RBC QN AUTO: 27.2 PG (ref 26.1–32.9)
MCH RBC QN AUTO: 27.2 PG (ref 26.1–32.9)
MCH RBC QN AUTO: 27.4 PG (ref 26.1–32.9)
MCH RBC QN AUTO: 27.8 PG (ref 26.1–32.9)
MCH RBC QN AUTO: 27.9 PG (ref 26.1–32.9)
MCH RBC QN AUTO: 27.9 PG (ref 26.1–32.9)
MCH RBC QN AUTO: 28.3 PG (ref 26.1–32.9)
MCHC RBC AUTO-ENTMCNC: 30.9 G/DL (ref 31.4–35)
MCHC RBC AUTO-ENTMCNC: 31.5 G/DL (ref 31.4–35)
MCHC RBC AUTO-ENTMCNC: 31.7 G/DL (ref 31.4–35)
MCHC RBC AUTO-ENTMCNC: 32.1 G/DL (ref 31.4–35)
MCHC RBC AUTO-ENTMCNC: 32.2 G/DL (ref 31.4–35)
MCHC RBC AUTO-ENTMCNC: 32.5 G/DL (ref 31.4–35)
MCHC RBC AUTO-ENTMCNC: 33.2 G/DL (ref 31.4–35)
MCHC RBC AUTO-ENTMCNC: 33.2 G/DL (ref 31.4–35)
MCHC RBC AUTO-ENTMCNC: 33.3 G/DL (ref 31.4–35)
MCHC RBC AUTO-ENTMCNC: 34.6 G/DL (ref 31.4–35)
MCV RBC AUTO: 80.8 FL (ref 79.6–97.8)
MCV RBC AUTO: 81 FL (ref 79.6–97.8)
MCV RBC AUTO: 81.6 FL (ref 79.6–97.8)
MCV RBC AUTO: 81.7 FL (ref 79.6–97.8)
MCV RBC AUTO: 83.6 FL (ref 79.6–97.8)
MCV RBC AUTO: 83.7 FL (ref 79.6–97.8)
MCV RBC AUTO: 84 FL (ref 79.6–97.8)
MCV RBC AUTO: 84.4 FL (ref 79.6–97.8)
MCV RBC AUTO: 84.8 FL (ref 79.6–97.8)
MCV RBC AUTO: 88 FL (ref 79.6–97.8)
MONOCYTES # BLD: 0.5 K/UL (ref 0.1–1.3)
MONOCYTES # BLD: 0.6 K/UL (ref 0.1–1.3)
MONOCYTES # BLD: 0.7 K/UL (ref 0.1–1.3)
MONOCYTES # BLD: 0.7 K/UL (ref 0.1–1.3)
MONOCYTES NFR BLD: 5 % (ref 4–12)
MONOCYTES NFR BLD: 5 % (ref 4–12)
MONOCYTES NFR BLD: 6 % (ref 4–12)
MONOCYTES NFR BLD: 7 % (ref 4–12)
MONOCYTES NFR BLD: 8 % (ref 4–12)
NEUTS SEG # BLD: 3.8 K/UL (ref 1.7–8.2)
NEUTS SEG # BLD: 4.4 K/UL (ref 1.7–8.2)
NEUTS SEG # BLD: 5 K/UL (ref 1.7–8.2)
NEUTS SEG # BLD: 6.2 K/UL (ref 1.7–8.2)
NEUTS SEG # BLD: 7.1 K/UL (ref 1.7–8.2)
NEUTS SEG # BLD: 7.3 K/UL (ref 1.7–8.2)
NEUTS SEG # BLD: 8.1 K/UL (ref 1.7–8.2)
NEUTS SEG NFR BLD: 43 % (ref 43–78)
NEUTS SEG NFR BLD: 54 % (ref 43–78)
NEUTS SEG NFR BLD: 56 % (ref 43–78)
NEUTS SEG NFR BLD: 56 % (ref 43–78)
NEUTS SEG NFR BLD: 58 % (ref 43–78)
NEUTS SEG NFR BLD: 63 % (ref 43–78)
NEUTS SEG NFR BLD: 66 % (ref 43–78)
NEUTS SEG NFR BLD: 69 % (ref 43–78)
NEUTS SEG NFR BLD: 74 % (ref 43–78)
NITRITE UR QL STRIP.AUTO: NEGATIVE
P-R INTERVAL, ECG05: 118 MS
PH UR STRIP: 6.5 [PH] (ref 5–9)
PLATELET # BLD AUTO: 275 K/UL (ref 150–450)
PLATELET # BLD AUTO: 307 K/UL (ref 150–450)
PLATELET # BLD AUTO: 310 K/UL (ref 150–450)
PLATELET # BLD AUTO: 386 K/UL (ref 150–450)
PLATELET # BLD AUTO: 431 K/UL (ref 150–450)
PLATELET # BLD AUTO: 436 K/UL (ref 150–450)
PLATELET # BLD AUTO: 440 K/UL (ref 150–450)
PLATELET # BLD AUTO: 464 K/UL (ref 150–450)
PLATELET # BLD AUTO: 484 K/UL (ref 150–450)
PLATELET # BLD AUTO: 508 K/UL (ref 150–450)
PLATELET COMMENTS,PCOM: ADEQUATE
PMV BLD AUTO: 10 FL (ref 10.8–14.1)
PMV BLD AUTO: 10 FL (ref 10.8–14.1)
PMV BLD AUTO: 10.1 FL (ref 10.8–14.1)
PMV BLD AUTO: 10.4 FL (ref 10.8–14.1)
PMV BLD AUTO: 10.4 FL (ref 10.8–14.1)
PMV BLD AUTO: 10.5 FL (ref 10.8–14.1)
PMV BLD AUTO: 10.6 FL (ref 10.8–14.1)
PMV BLD AUTO: 10.9 FL (ref 10.8–14.1)
PMV BLD AUTO: 11.1 FL (ref 10.8–14.1)
PMV BLD AUTO: 11.5 FL (ref 10.8–14.1)
POTASSIUM SERPL-SCNC: 2.8 MMOL/L (ref 3.5–5.1)
POTASSIUM SERPL-SCNC: 3.3 MMOL/L (ref 3.5–5.1)
POTASSIUM SERPL-SCNC: 3.4 MMOL/L (ref 3.5–5.1)
POTASSIUM SERPL-SCNC: 3.5 MMOL/L (ref 3.5–5.1)
POTASSIUM SERPL-SCNC: 3.8 MMOL/L (ref 3.5–5.1)
POTASSIUM SERPL-SCNC: 3.9 MMOL/L (ref 3.5–5.1)
POTASSIUM SERPL-SCNC: 5.2 MMOL/L (ref 3.5–5.1)
POTASSIUM SERPL-SCNC: 5.5 MMOL/L (ref 3.5–5.1)
PROT SERPL-MCNC: 6.6 G/DL (ref 6.3–8.2)
PROT SERPL-MCNC: 6.6 G/DL (ref 6.3–8.2)
PROT SERPL-MCNC: 6.8 G/DL (ref 6.3–8.2)
PROT SERPL-MCNC: 6.9 G/DL (ref 6.3–8.2)
PROT SERPL-MCNC: 7 G/DL (ref 6.3–8.2)
PROT SERPL-MCNC: 7.1 G/DL (ref 6.3–8.2)
PROT SERPL-MCNC: 7.3 G/DL (ref 6.3–8.2)
PROT SERPL-MCNC: 7.3 G/DL (ref 6.3–8.2)
PROT SERPL-MCNC: 8.3 G/DL (ref 6.3–8.2)
PROT SERPL-MCNC: 8.4 G/DL (ref 6.3–8.2)
PROT SERPL-MCNC: 8.5 G/DL (ref 6.3–8.2)
PROT UR STRIP-MCNC: ABNORMAL MG/DL
PROTHROMBIN TIME: 14.8 SEC (ref 11.5–14.5)
PROTHROMBIN TIME: 16.4 SEC (ref 11.5–14.5)
Q-T INTERVAL, ECG07: 324 MS
QRS DURATION, ECG06: 70 MS
QTC CALCULATION (BEZET), ECG08: 478 MS
RBC # BLD AUTO: 2.88 M/UL (ref 4.05–5.25)
RBC # BLD AUTO: 3.11 M/UL (ref 4.05–5.25)
RBC # BLD AUTO: 3.23 M/UL (ref 4.05–5.25)
RBC # BLD AUTO: 3.24 M/UL (ref 4.05–5.25)
RBC # BLD AUTO: 3.39 M/UL (ref 4.05–5.25)
RBC # BLD AUTO: 3.62 M/UL (ref 4.05–5.25)
RBC # BLD AUTO: 3.63 M/UL (ref 4.05–5.25)
RBC # BLD AUTO: 3.8 M/UL (ref 4.05–5.25)
RBC # BLD AUTO: 4.09 M/UL (ref 4.05–5.25)
RBC # BLD AUTO: 4.42 M/UL (ref 4.05–5.25)
RBC #/AREA URNS HPF: ABNORMAL /HPF
RBC MORPH BLD: ABNORMAL
SERVICE CMNT-IMP: NORMAL
SERVICE CMNT-IMP: NORMAL
SODIUM SERPL-SCNC: 137 MMOL/L (ref 136–145)
SODIUM SERPL-SCNC: 139 MMOL/L (ref 136–145)
SODIUM SERPL-SCNC: 139 MMOL/L (ref 136–145)
SODIUM SERPL-SCNC: 140 MMOL/L (ref 136–145)
SODIUM SERPL-SCNC: 140 MMOL/L (ref 136–145)
SODIUM SERPL-SCNC: 142 MMOL/L (ref 136–145)
SP GR UR REFRACTOMETRY: 1.02 (ref 1–1.02)
SPECIMEN EXP DATE BLD: NORMAL
STATUS OF UNIT,%ST: NORMAL
STATUS OF UNIT,%ST: NORMAL
TROPONIN I SERPL-MCNC: <0.02 NG/ML (ref 0.02–0.05)
TSH SERPL DL<=0.005 MIU/L-ACNC: 0.01 UIU/ML (ref 0.36–3.74)
UNIT DIVISION, %UDIV: 0
UNIT DIVISION, %UDIV: 0
UROBILINOGEN UR QL STRIP.AUTO: 0.2 EU/DL (ref 0.2–1)
VENTRICULAR RATE, ECG03: 131 BPM
VIT B12 SERPL-MCNC: 1143 PG/ML (ref 193–986)
WBC # BLD AUTO: 10.1 K/UL (ref 4.3–11.1)
WBC # BLD AUTO: 12.1 K/UL (ref 4.3–11.1)
WBC # BLD AUTO: 7.9 K/UL (ref 4.3–11.1)
WBC # BLD AUTO: 8.5 K/UL (ref 4.3–11.1)
WBC # BLD AUTO: 8.6 K/UL (ref 4.3–11.1)
WBC # BLD AUTO: 8.6 K/UL (ref 4.3–11.1)
WBC # BLD AUTO: 8.8 K/UL (ref 4.3–11.1)
WBC # BLD AUTO: 9.1 K/UL (ref 4.3–11.1)
WBC # BLD AUTO: 9.9 K/UL (ref 4.3–11.1)
WBC # BLD AUTO: 9.9 K/UL (ref 4.3–11.1)
WBC MORPH BLD: ABNORMAL
WBC URNS QL MICRO: ABNORMAL /HPF

## 2018-01-01 PROCEDURE — G0299 HHS/HOSPICE OF RN EA 15 MIN: HCPCS

## 2018-01-01 PROCEDURE — 77030036933 HC BRSH RX CYTO WREGD BSC -C: Performed by: INTERNAL MEDICINE

## 2018-01-01 PROCEDURE — 82378 CARCINOEMBRYONIC ANTIGEN: CPT | Performed by: INTERNAL MEDICINE

## 2018-01-01 PROCEDURE — HOSPICE MEDICATION HC HH HOSPICE MEDICATION

## 2018-01-01 PROCEDURE — A9541 TC99M SULFUR COLLOID: HCPCS

## 2018-01-01 PROCEDURE — 0651 HSPC ROUTINE HOME CARE

## 2018-01-01 PROCEDURE — 76040000026: Performed by: INTERNAL MEDICINE

## 2018-01-01 PROCEDURE — 80048 BASIC METABOLIC PNL TOTAL CA: CPT | Performed by: INTERNAL MEDICINE

## 2018-01-01 PROCEDURE — 74011250637 HC RX REV CODE- 250/637: Performed by: INTERNAL MEDICINE

## 2018-01-01 PROCEDURE — 77030020263 HC SOL INJ SOD CL0.9% LFCR 1000ML

## 2018-01-01 PROCEDURE — 74011250636 HC RX REV CODE- 250/636: Performed by: INTERNAL MEDICINE

## 2018-01-01 PROCEDURE — 85025 COMPLETE CBC W/AUTO DIFF WBC: CPT | Performed by: EMERGENCY MEDICINE

## 2018-01-01 PROCEDURE — G0155 HHCP-SVS OF CSW,EA 15 MIN: HCPCS

## 2018-01-01 PROCEDURE — A9270 NON-COVERED ITEM OR SERVICE: HCPCS

## 2018-01-01 PROCEDURE — 80307 DRUG TEST PRSMV CHEM ANLYZR: CPT | Performed by: INTERNAL MEDICINE

## 2018-01-01 PROCEDURE — 74011250637 HC RX REV CODE- 250/637: Performed by: FAMILY MEDICINE

## 2018-01-01 PROCEDURE — 65270000029 HC RM PRIVATE

## 2018-01-01 PROCEDURE — 77030007288 HC DEV LOK BILI BSC -A: Performed by: INTERNAL MEDICINE

## 2018-01-01 PROCEDURE — BF131ZZ FLUOROSCOPY OF GALLBLADDER AND BILE DUCTS USING LOW OSMOLAR CONTRAST: ICD-10-PCS | Performed by: INTERNAL MEDICINE

## 2018-01-01 PROCEDURE — 99212 OFFICE O/P EST SF 10 MIN: CPT

## 2018-01-01 PROCEDURE — 88112 CYTOPATH CELL ENHANCE TECH: CPT | Performed by: INTERNAL MEDICINE

## 2018-01-01 PROCEDURE — 80053 COMPREHEN METABOLIC PANEL: CPT | Performed by: INTERNAL MEDICINE

## 2018-01-01 PROCEDURE — 74011000258 HC RX REV CODE- 258: Performed by: NURSE PRACTITIONER

## 2018-01-01 PROCEDURE — 86301 IMMUNOASSAY TUMOR CA 19-9: CPT | Performed by: INTERNAL MEDICINE

## 2018-01-01 PROCEDURE — G0156 HHCP-SVS OF AIDE,EA 15 MIN: HCPCS

## 2018-01-01 PROCEDURE — 65390000012 HC CONDITION CODE 44 OBSERVATION

## 2018-01-01 PROCEDURE — 77030012893

## 2018-01-01 PROCEDURE — 99223 1ST HOSP IP/OBS HIGH 75: CPT | Performed by: INTERNAL MEDICINE

## 2018-01-01 PROCEDURE — 82607 VITAMIN B-12: CPT | Performed by: INTERNAL MEDICINE

## 2018-01-01 PROCEDURE — A4335 INCONTINENCE SUPPLY: HCPCS

## 2018-01-01 PROCEDURE — 84484 ASSAY OF TROPONIN QUANT: CPT | Performed by: EMERGENCY MEDICINE

## 2018-01-01 PROCEDURE — 74011250636 HC RX REV CODE- 250/636: Performed by: EMERGENCY MEDICINE

## 2018-01-01 PROCEDURE — 74330 X-RAY BILE/PANC ENDOSCOPY: CPT

## 2018-01-01 PROCEDURE — 74011000250 HC RX REV CODE- 250: Performed by: INTERNAL MEDICINE

## 2018-01-01 PROCEDURE — 74011250636 HC RX REV CODE- 250/636

## 2018-01-01 PROCEDURE — 74177 CT ABD & PELVIS W/CONTRAST: CPT

## 2018-01-01 PROCEDURE — C9113 INJ PANTOPRAZOLE SODIUM, VIA: HCPCS | Performed by: INTERNAL MEDICINE

## 2018-01-01 PROCEDURE — 85025 COMPLETE CBC W/AUTO DIFF WBC: CPT | Performed by: INTERNAL MEDICINE

## 2018-01-01 PROCEDURE — 81003 URINALYSIS AUTO W/O SCOPE: CPT | Performed by: EMERGENCY MEDICINE

## 2018-01-01 PROCEDURE — 96375 TX/PRO/DX INJ NEW DRUG ADDON: CPT

## 2018-01-01 PROCEDURE — C1769 GUIDE WIRE: HCPCS | Performed by: INTERNAL MEDICINE

## 2018-01-01 PROCEDURE — 36415 COLL VENOUS BLD VENIPUNCTURE: CPT | Performed by: INTERNAL MEDICINE

## 2018-01-01 PROCEDURE — 81001 URINALYSIS AUTO W/SCOPE: CPT | Performed by: INTERNAL MEDICINE

## 2018-01-01 PROCEDURE — 77030034849

## 2018-01-01 PROCEDURE — 76000 FLUOROSCOPY <1 HR PHYS/QHP: CPT

## 2018-01-01 PROCEDURE — 96375 TX/PRO/DX INJ NEW DRUG ADDON: CPT | Performed by: EMERGENCY MEDICINE

## 2018-01-01 PROCEDURE — 82962 GLUCOSE BLOOD TEST: CPT

## 2018-01-01 PROCEDURE — 96376 TX/PRO/DX INJ SAME DRUG ADON: CPT

## 2018-01-01 PROCEDURE — T4522 ADULT SIZE BRIEF/DIAPER MED: HCPCS

## 2018-01-01 PROCEDURE — 77030009038 HC CATH BILI STN RTVR BSC -C: Performed by: INTERNAL MEDICINE

## 2018-01-01 PROCEDURE — 0DBN8ZX EXCISION OF SIGMOID COLON, VIA NATURAL OR ARTIFICIAL OPENING ENDOSCOPIC, DIAGNOSTIC: ICD-10-PCS | Performed by: INTERNAL MEDICINE

## 2018-01-01 PROCEDURE — 51798 US URINE CAPACITY MEASURE: CPT

## 2018-01-01 PROCEDURE — 0FB78ZX EXCISION OF COMMON HEPATIC DUCT, VIA NATURAL OR ARTIFICIAL OPENING ENDOSCOPIC, DIAGNOSTIC: ICD-10-PCS | Performed by: INTERNAL MEDICINE

## 2018-01-01 PROCEDURE — 99232 SBSQ HOSP IP/OBS MODERATE 35: CPT | Performed by: INTERNAL MEDICINE

## 2018-01-01 PROCEDURE — 77030012595 HC SPHNTOM BILI BSC -D: Performed by: INTERNAL MEDICINE

## 2018-01-01 PROCEDURE — C1726 CATH, BAL DIL, NON-VASCULAR: HCPCS | Performed by: INTERNAL MEDICINE

## 2018-01-01 PROCEDURE — 76040000025: Performed by: INTERNAL MEDICINE

## 2018-01-01 PROCEDURE — 85610 PROTHROMBIN TIME: CPT | Performed by: EMERGENCY MEDICINE

## 2018-01-01 PROCEDURE — T4524 ADULT SIZE BRIEF/DIAPER XL: HCPCS

## 2018-01-01 PROCEDURE — 77030018719 HC DRSG PTCH ANTIMIC J&J -A

## 2018-01-01 PROCEDURE — 77030011640 HC PAD GRND REM COVD -A: Performed by: INTERNAL MEDICINE

## 2018-01-01 PROCEDURE — 74011000250 HC RX REV CODE- 250: Performed by: EMERGENCY MEDICINE

## 2018-01-01 PROCEDURE — T4526 ADULT SIZE PULL-ON MED: HCPCS

## 2018-01-01 PROCEDURE — T4541 LARGE DISPOSABLE UNDERPAD: HCPCS

## 2018-01-01 PROCEDURE — 74011000258 HC RX REV CODE- 258: Performed by: INTERNAL MEDICINE

## 2018-01-01 PROCEDURE — 99343 PR HOME VST NEW PATIENT MOD-HI SEVERITY 45 MINUTES: CPT | Performed by: INTERNAL MEDICINE

## 2018-01-01 PROCEDURE — 77030009426 HC FCPS BIOP ENDOSC BSC -B: Performed by: INTERNAL MEDICINE

## 2018-01-01 PROCEDURE — 82272 OCCULT BLD FECES 1-3 TESTS: CPT | Performed by: INTERNAL MEDICINE

## 2018-01-01 PROCEDURE — 77030019908 HC STETH ESOPH SIMS -A: Performed by: ANESTHESIOLOGY

## 2018-01-01 PROCEDURE — 99218 HC RM OBSERVATION: CPT

## 2018-01-01 PROCEDURE — 76700 US EXAM ABDOM COMPLETE: CPT

## 2018-01-01 PROCEDURE — 84443 ASSAY THYROID STIM HORMONE: CPT | Performed by: EMERGENCY MEDICINE

## 2018-01-01 PROCEDURE — 74011636320 HC RX REV CODE- 636/320: Performed by: INTERNAL MEDICINE

## 2018-01-01 PROCEDURE — 94760 N-INVAS EAR/PLS OXIMETRY 1: CPT

## 2018-01-01 PROCEDURE — 74011000250 HC RX REV CODE- 250

## 2018-01-01 PROCEDURE — 83690 ASSAY OF LIPASE: CPT | Performed by: EMERGENCY MEDICINE

## 2018-01-01 PROCEDURE — 74011250636 HC RX REV CODE- 250/636: Performed by: NURSE PRACTITIONER

## 2018-01-01 PROCEDURE — 83735 ASSAY OF MAGNESIUM: CPT | Performed by: INTERNAL MEDICINE

## 2018-01-01 PROCEDURE — 96361 HYDRATE IV INFUSION ADD-ON: CPT | Performed by: EMERGENCY MEDICINE

## 2018-01-01 PROCEDURE — 96365 THER/PROPH/DIAG IV INF INIT: CPT

## 2018-01-01 PROCEDURE — 99284 EMERGENCY DEPT VISIT MOD MDM: CPT | Performed by: EMERGENCY MEDICINE

## 2018-01-01 PROCEDURE — 77030032490 HC SLV COMPR SCD KNE COVD -B

## 2018-01-01 PROCEDURE — 99285 EMERGENCY DEPT VISIT HI MDM: CPT | Performed by: EMERGENCY MEDICINE

## 2018-01-01 PROCEDURE — 76937 US GUIDE VASCULAR ACCESS: CPT

## 2018-01-01 PROCEDURE — P9016 RBC LEUKOCYTES REDUCED: HCPCS | Performed by: INTERNAL MEDICINE

## 2018-01-01 PROCEDURE — A4520 INCONTINENCE GARMENT ANYTYPE: HCPCS

## 2018-01-01 PROCEDURE — 77030032490 HC SLV COMPR SCD KNE COVD -B: Performed by: INTERNAL MEDICINE

## 2018-01-01 PROCEDURE — 87040 BLOOD CULTURE FOR BACTERIA: CPT | Performed by: INTERNAL MEDICINE

## 2018-01-01 PROCEDURE — 77030008477 HC STYL SATN SLP COVD -A: Performed by: ANESTHESIOLOGY

## 2018-01-01 PROCEDURE — 74011250636 HC RX REV CODE- 250/636: Performed by: ANESTHESIOLOGY

## 2018-01-01 PROCEDURE — 36430 TRANSFUSION BLD/BLD COMPNT: CPT

## 2018-01-01 PROCEDURE — 3336500001 HSPC ELECTION

## 2018-01-01 PROCEDURE — 85018 HEMOGLOBIN: CPT | Performed by: INTERNAL MEDICINE

## 2018-01-01 PROCEDURE — 82746 ASSAY OF FOLIC ACID SERUM: CPT | Performed by: INTERNAL MEDICINE

## 2018-01-01 PROCEDURE — A4927 NON-STERILE GLOVES: HCPCS

## 2018-01-01 PROCEDURE — 96374 THER/PROPH/DIAG INJ IV PUSH: CPT | Performed by: EMERGENCY MEDICINE

## 2018-01-01 PROCEDURE — 86923 COMPATIBILITY TEST ELECTRIC: CPT | Performed by: INTERNAL MEDICINE

## 2018-01-01 PROCEDURE — C1876 STENT, NON-COA/NON-COV W/DEL: HCPCS | Performed by: INTERNAL MEDICINE

## 2018-01-01 PROCEDURE — 88305 TISSUE EXAM BY PATHOLOGIST: CPT | Performed by: INTERNAL MEDICINE

## 2018-01-01 PROCEDURE — 77030005537 HC CATH URETH BARD -A

## 2018-01-01 PROCEDURE — 80053 COMPREHEN METABOLIC PANEL: CPT | Performed by: EMERGENCY MEDICINE

## 2018-01-01 PROCEDURE — 76060000031 HC ANESTHESIA FIRST 0.5 HR: Performed by: INTERNAL MEDICINE

## 2018-01-01 PROCEDURE — 80076 HEPATIC FUNCTION PANEL: CPT | Performed by: INTERNAL MEDICINE

## 2018-01-01 PROCEDURE — HHS10554 SHAMPOO/BODY WASH 8 OZ ALOE VESTA

## 2018-01-01 PROCEDURE — 86900 BLOOD TYPING SEROLOGIC ABO: CPT | Performed by: INTERNAL MEDICINE

## 2018-01-01 PROCEDURE — 76060000033 HC ANESTHESIA 1 TO 1.5 HR: Performed by: INTERNAL MEDICINE

## 2018-01-01 PROCEDURE — 0F778DZ DILATION OF COMMON HEPATIC DUCT WITH INTRALUMINAL DEVICE, VIA NATURAL OR ARTIFICIAL OPENING ENDOSCOPIC: ICD-10-PCS | Performed by: INTERNAL MEDICINE

## 2018-01-01 PROCEDURE — C1751 CATH, INF, PER/CENT/MIDLINE: HCPCS

## 2018-01-01 PROCEDURE — 77030039270 HC TU BLD FLTR CARD -A

## 2018-01-01 PROCEDURE — 77030018786 HC NDL GD F/USND BARD -B

## 2018-01-01 PROCEDURE — 02HV33Z INSERTION OF INFUSION DEVICE INTO SUPERIOR VENA CAVA, PERCUTANEOUS APPROACH: ICD-10-PCS | Performed by: INTERNAL MEDICINE

## 2018-01-01 PROCEDURE — 36569 INSJ PICC 5 YR+ W/O IMAGING: CPT | Performed by: INTERNAL MEDICINE

## 2018-01-01 PROCEDURE — 77030008771 HC TU NG SALEM SUMP -A: Performed by: ANESTHESIOLOGY

## 2018-01-01 PROCEDURE — 71260 CT THORAX DX C+: CPT

## 2018-01-01 PROCEDURE — T4523 ADULT SIZE BRIEF/DIAPER LG: HCPCS

## 2018-01-01 PROCEDURE — 93005 ELECTROCARDIOGRAM TRACING: CPT | Performed by: EMERGENCY MEDICINE

## 2018-01-01 PROCEDURE — 71046 X-RAY EXAM CHEST 2 VIEWS: CPT

## 2018-01-01 PROCEDURE — 87086 URINE CULTURE/COLONY COUNT: CPT | Performed by: INTERNAL MEDICINE

## 2018-01-01 PROCEDURE — 74011000258 HC RX REV CODE- 258: Performed by: EMERGENCY MEDICINE

## 2018-01-01 PROCEDURE — 36592 COLLECT BLOOD FROM PICC: CPT

## 2018-01-01 PROCEDURE — A9577 INJ MULTIHANCE: HCPCS | Performed by: EMERGENCY MEDICINE

## 2018-01-01 PROCEDURE — 76705 ECHO EXAM OF ABDOMEN: CPT

## 2018-01-01 PROCEDURE — C2625 STENT, NON-COR, TEM W/DEL SY: HCPCS | Performed by: INTERNAL MEDICINE

## 2018-01-01 PROCEDURE — 83036 HEMOGLOBIN GLYCOSYLATED A1C: CPT | Performed by: INTERNAL MEDICINE

## 2018-01-01 PROCEDURE — 0D7N8DZ DILATION OF SIGMOID COLON WITH INTRALUMINAL DEVICE, VIA NATURAL OR ARTIFICIAL OPENING ENDOSCOPIC: ICD-10-PCS | Performed by: INTERNAL MEDICINE

## 2018-01-01 PROCEDURE — 83735 ASSAY OF MAGNESIUM: CPT | Performed by: EMERGENCY MEDICINE

## 2018-01-01 PROCEDURE — 93971 EXTREMITY STUDY: CPT

## 2018-01-01 PROCEDURE — 85610 PROTHROMBIN TIME: CPT | Performed by: INTERNAL MEDICINE

## 2018-01-01 PROCEDURE — 74183 MRI ABD W/O CNTR FLWD CNTR: CPT

## 2018-01-01 PROCEDURE — 77030008703 HC TU ET UNCUF COVD -A: Performed by: ANESTHESIOLOGY

## 2018-01-01 PROCEDURE — 85027 COMPLETE CBC AUTOMATED: CPT | Performed by: INTERNAL MEDICINE

## 2018-01-01 PROCEDURE — 74011636320 HC RX REV CODE- 636/320: Performed by: EMERGENCY MEDICINE

## 2018-01-01 PROCEDURE — 74011250637 HC RX REV CODE- 250/637: Performed by: PHYSICIAN ASSISTANT

## 2018-01-01 DEVICE — STENT SYSTEM WITH ANCHOR LOCK DELIVERY SYSTEM
Type: IMPLANTABLE DEVICE | Site: SIGMOID COLON | Status: FUNCTIONAL
Brand: WALLFLEX™ COLONIC

## 2018-01-01 DEVICE — BILIARY STENT WITH RX DELIVERY SYSTEM
Type: IMPLANTABLE DEVICE | Site: BILE DUCT | Status: FUNCTIONAL
Brand: RX BILIARY

## 2018-01-01 RX ORDER — POTASSIUM CHLORIDE 20 MEQ/1
40 TABLET, EXTENDED RELEASE ORAL
Status: COMPLETED | OUTPATIENT
Start: 2018-01-01 | End: 2018-01-01

## 2018-01-01 RX ORDER — RISPERIDONE 0.5 MG/1
1 TABLET, FILM COATED ORAL 4 TIMES DAILY
Status: DISCONTINUED | OUTPATIENT
Start: 2018-01-01 | End: 2018-01-01 | Stop reason: HOSPADM

## 2018-01-01 RX ORDER — LEVOTHYROXINE SODIUM 112 UG/1
224 TABLET ORAL
Status: DISCONTINUED | OUTPATIENT
Start: 2018-01-01 | End: 2018-01-01

## 2018-01-01 RX ORDER — PROPOFOL 10 MG/ML
INJECTION, EMULSION INTRAVENOUS
Status: DISCONTINUED | OUTPATIENT
Start: 2018-01-01 | End: 2018-01-01 | Stop reason: HOSPADM

## 2018-01-01 RX ORDER — METOPROLOL TARTRATE 5 MG/5ML
25 INJECTION INTRAVENOUS ONCE
Status: DISCONTINUED | OUTPATIENT
Start: 2018-01-01 | End: 2018-01-01

## 2018-01-01 RX ORDER — FENTANYL CITRATE 50 UG/ML
INJECTION, SOLUTION INTRAMUSCULAR; INTRAVENOUS AS NEEDED
Status: DISCONTINUED | OUTPATIENT
Start: 2018-01-01 | End: 2018-01-01 | Stop reason: HOSPADM

## 2018-01-01 RX ORDER — INSULIN LISPRO 100 [IU]/ML
INJECTION, SOLUTION INTRAVENOUS; SUBCUTANEOUS EVERY 6 HOURS
Status: DISCONTINUED | OUTPATIENT
Start: 2018-01-01 | End: 2018-01-01 | Stop reason: HOSPADM

## 2018-01-01 RX ORDER — ALPRAZOLAM 0.5 MG/1
0.5 TABLET ORAL
Status: DISCONTINUED | OUTPATIENT
Start: 2018-01-01 | End: 2018-01-01 | Stop reason: HOSPADM

## 2018-01-01 RX ORDER — SODIUM CHLORIDE 0.9 % (FLUSH) 0.9 %
5-10 SYRINGE (ML) INJECTION EVERY 8 HOURS
Status: DISCONTINUED | OUTPATIENT
Start: 2018-01-01 | End: 2018-01-01 | Stop reason: HOSPADM

## 2018-01-01 RX ORDER — HEPARIN 100 UNIT/ML
600 SYRINGE INTRAVENOUS AS NEEDED
Status: DISCONTINUED | OUTPATIENT
Start: 2018-01-01 | End: 2018-01-01 | Stop reason: HOSPADM

## 2018-01-01 RX ORDER — LIDOCAINE HYDROCHLORIDE 20 MG/ML
INJECTION, SOLUTION EPIDURAL; INFILTRATION; INTRACAUDAL; PERINEURAL AS NEEDED
Status: DISCONTINUED | OUTPATIENT
Start: 2018-01-01 | End: 2018-01-01 | Stop reason: HOSPADM

## 2018-01-01 RX ORDER — PROPOFOL 10 MG/ML
INJECTION, EMULSION INTRAVENOUS AS NEEDED
Status: DISCONTINUED | OUTPATIENT
Start: 2018-01-01 | End: 2018-01-01 | Stop reason: HOSPADM

## 2018-01-01 RX ORDER — ONDANSETRON 2 MG/ML
INJECTION INTRAMUSCULAR; INTRAVENOUS AS NEEDED
Status: DISCONTINUED | OUTPATIENT
Start: 2018-01-01 | End: 2018-01-01 | Stop reason: HOSPADM

## 2018-01-01 RX ORDER — SODIUM CHLORIDE 0.9 % (FLUSH) 0.9 %
5-10 SYRINGE (ML) INJECTION AS NEEDED
Status: DISCONTINUED | OUTPATIENT
Start: 2018-01-01 | End: 2018-01-01 | Stop reason: HOSPADM

## 2018-01-01 RX ORDER — HEPARIN SODIUM 5000 [USP'U]/ML
5000 INJECTION, SOLUTION INTRAVENOUS; SUBCUTANEOUS EVERY 12 HOURS
Status: DISCONTINUED | OUTPATIENT
Start: 2018-01-01 | End: 2018-01-01 | Stop reason: HOSPADM

## 2018-01-01 RX ORDER — METOPROLOL SUCCINATE 25 MG/1
25 TABLET, EXTENDED RELEASE ORAL ONCE
Status: DISCONTINUED | OUTPATIENT
Start: 2018-01-01 | End: 2018-01-01

## 2018-01-01 RX ORDER — HEPARIN 100 UNIT/ML
600 SYRINGE INTRAVENOUS EVERY 8 HOURS
Status: DISCONTINUED | OUTPATIENT
Start: 2018-01-01 | End: 2018-01-01 | Stop reason: HOSPADM

## 2018-01-01 RX ORDER — SODIUM CHLORIDE 9 MG/ML
100 INJECTION, SOLUTION INTRAVENOUS CONTINUOUS
Status: DISCONTINUED | OUTPATIENT
Start: 2018-01-01 | End: 2018-01-01

## 2018-01-01 RX ORDER — POTASSIUM CHLORIDE 20 MEQ/1
60 TABLET, EXTENDED RELEASE ORAL
Status: COMPLETED | OUTPATIENT
Start: 2018-01-01 | End: 2018-01-01

## 2018-01-01 RX ORDER — FAMOTIDINE 20 MG/1
20 TABLET, FILM COATED ORAL AS NEEDED
Status: DISCONTINUED | OUTPATIENT
Start: 2018-01-01 | End: 2018-01-01 | Stop reason: HOSPADM

## 2018-01-01 RX ORDER — SODIUM FERRIC GLUCONATE COMPLEX IN SUCROSE 12.5 MG/ML
125 INJECTION INTRAVENOUS
Status: DISCONTINUED | OUTPATIENT
Start: 2018-01-01 | End: 2018-01-01 | Stop reason: SDUPTHER

## 2018-01-01 RX ORDER — ASPIRIN 81 MG/1
81 TABLET ORAL
Status: DISCONTINUED | OUTPATIENT
Start: 2018-01-01 | End: 2018-01-01

## 2018-01-01 RX ORDER — QUETIAPINE FUMARATE 100 MG/1
600 TABLET, FILM COATED ORAL
Status: DISCONTINUED | OUTPATIENT
Start: 2018-01-01 | End: 2018-01-01 | Stop reason: HOSPADM

## 2018-01-01 RX ORDER — PANTOPRAZOLE SODIUM 40 MG/1
40 TABLET, DELAYED RELEASE ORAL
Status: DISCONTINUED | OUTPATIENT
Start: 2018-01-01 | End: 2018-01-01 | Stop reason: HOSPADM

## 2018-01-01 RX ORDER — SODIUM CHLORIDE, SODIUM LACTATE, POTASSIUM CHLORIDE, CALCIUM CHLORIDE 600; 310; 30; 20 MG/100ML; MG/100ML; MG/100ML; MG/100ML
100 INJECTION, SOLUTION INTRAVENOUS CONTINUOUS
Status: DISCONTINUED | OUTPATIENT
Start: 2018-01-01 | End: 2018-01-01

## 2018-01-01 RX ORDER — SODIUM CHLORIDE, SODIUM LACTATE, POTASSIUM CHLORIDE, CALCIUM CHLORIDE 600; 310; 30; 20 MG/100ML; MG/100ML; MG/100ML; MG/100ML
100 INJECTION, SOLUTION INTRAVENOUS CONTINUOUS
Status: CANCELLED | OUTPATIENT
Start: 2018-01-01

## 2018-01-01 RX ORDER — POLYETHYLENE GLYCOL 3350 17 G/17G
17 POWDER, FOR SOLUTION ORAL 2 TIMES DAILY
Qty: 1530 G | Refills: 2 | Status: SHIPPED | OUTPATIENT
Start: 2018-01-01 | End: 2018-01-01

## 2018-01-01 RX ORDER — POLYETHYLENE GLYCOL 3350 17 G/17G
17 POWDER, FOR SOLUTION ORAL EVERY 12 HOURS
Status: DISCONTINUED | OUTPATIENT
Start: 2018-01-01 | End: 2018-01-01 | Stop reason: HOSPADM

## 2018-01-01 RX ORDER — METRONIDAZOLE 500 MG/1
500 TABLET ORAL ONCE
Status: COMPLETED | OUTPATIENT
Start: 2018-01-01 | End: 2018-01-01

## 2018-01-01 RX ORDER — SODIUM CHLORIDE 0.9 % (FLUSH) 0.9 %
10 SYRINGE (ML) INJECTION
Status: COMPLETED | OUTPATIENT
Start: 2018-01-01 | End: 2018-01-01

## 2018-01-01 RX ORDER — SODIUM CHLORIDE 0.9 % (FLUSH) 0.9 %
20 SYRINGE (ML) INJECTION AS NEEDED
Status: DISCONTINUED | OUTPATIENT
Start: 2018-01-01 | End: 2018-01-01 | Stop reason: HOSPADM

## 2018-01-01 RX ORDER — LEVOTHYROXINE SODIUM 200 UG/1
200 TABLET ORAL
Status: DISCONTINUED | OUTPATIENT
Start: 2018-01-01 | End: 2018-01-01 | Stop reason: HOSPADM

## 2018-01-01 RX ORDER — POTASSIUM CHLORIDE 20 MEQ/1
40 TABLET, EXTENDED RELEASE ORAL 3 TIMES DAILY
Status: COMPLETED | OUTPATIENT
Start: 2018-01-01 | End: 2018-01-01

## 2018-01-01 RX ORDER — ONDANSETRON 2 MG/ML
4 INJECTION INTRAMUSCULAR; INTRAVENOUS
Status: DISCONTINUED | OUTPATIENT
Start: 2018-01-01 | End: 2018-01-01 | Stop reason: HOSPADM

## 2018-01-01 RX ORDER — FLUDROCORTISONE ACETATE 0.1 MG/1
0.1 TABLET ORAL DAILY
Status: DISCONTINUED | OUTPATIENT
Start: 2018-01-01 | End: 2018-01-01 | Stop reason: HOSPADM

## 2018-01-01 RX ORDER — ESOMEPRAZOLE MAGNESIUM 40 MG/1
CAPSULE, DELAYED RELEASE ORAL
Qty: 60 CAP | Refills: 0 | Status: SHIPPED | OUTPATIENT
Start: 2018-01-01 | End: 2018-01-01

## 2018-01-01 RX ORDER — DICYCLOMINE HYDROCHLORIDE 10 MG/1
10 CAPSULE ORAL
Status: DISCONTINUED | OUTPATIENT
Start: 2018-01-01 | End: 2018-01-01 | Stop reason: HOSPADM

## 2018-01-01 RX ORDER — POLYETHYLENE GLYCOL 3350 17 G/17G
17 POWDER, FOR SOLUTION ORAL DAILY
Status: DISCONTINUED | OUTPATIENT
Start: 2018-01-01 | End: 2018-01-01 | Stop reason: HOSPADM

## 2018-01-01 RX ORDER — ACETAMINOPHEN 325 MG/1
650 TABLET ORAL
Status: DISCONTINUED | OUTPATIENT
Start: 2018-01-01 | End: 2018-01-01 | Stop reason: HOSPADM

## 2018-01-01 RX ORDER — LEVOTHYROXINE SODIUM 112 UG/1
112 TABLET ORAL
Status: DISCONTINUED | OUTPATIENT
Start: 2018-01-01 | End: 2018-01-01

## 2018-01-01 RX ORDER — LEVOTHYROXINE SODIUM 88 UG/1
88 TABLET ORAL
Status: DISCONTINUED | OUTPATIENT
Start: 2018-01-01 | End: 2018-01-01

## 2018-01-01 RX ORDER — SODIUM CHLORIDE, SODIUM LACTATE, POTASSIUM CHLORIDE, CALCIUM CHLORIDE 600; 310; 30; 20 MG/100ML; MG/100ML; MG/100ML; MG/100ML
100 INJECTION, SOLUTION INTRAVENOUS CONTINUOUS
Status: DISCONTINUED | OUTPATIENT
Start: 2018-01-01 | End: 2018-01-01 | Stop reason: HOSPADM

## 2018-01-01 RX ORDER — FERROUS SULFATE, DRIED 160(50) MG
1 TABLET, EXTENDED RELEASE ORAL DAILY
Status: DISCONTINUED | OUTPATIENT
Start: 2018-01-01 | End: 2018-01-01 | Stop reason: HOSPADM

## 2018-01-01 RX ORDER — SODIUM CHLORIDE, SODIUM LACTATE, POTASSIUM CHLORIDE, CALCIUM CHLORIDE 600; 310; 30; 20 MG/100ML; MG/100ML; MG/100ML; MG/100ML
1000 INJECTION, SOLUTION INTRAVENOUS CONTINUOUS
Status: DISCONTINUED | OUTPATIENT
Start: 2018-01-01 | End: 2018-01-01 | Stop reason: HOSPADM

## 2018-01-01 RX ORDER — HYDRALAZINE HYDROCHLORIDE 20 MG/ML
INJECTION INTRAMUSCULAR; INTRAVENOUS AS NEEDED
Status: DISCONTINUED | OUTPATIENT
Start: 2018-01-01 | End: 2018-01-01 | Stop reason: HOSPADM

## 2018-01-01 RX ORDER — HYDRALAZINE HYDROCHLORIDE 20 MG/ML
10 INJECTION INTRAMUSCULAR; INTRAVENOUS
Status: DISCONTINUED | OUTPATIENT
Start: 2018-01-01 | End: 2018-01-01 | Stop reason: HOSPADM

## 2018-01-01 RX ORDER — ROCURONIUM BROMIDE 10 MG/ML
INJECTION, SOLUTION INTRAVENOUS AS NEEDED
Status: DISCONTINUED | OUTPATIENT
Start: 2018-01-01 | End: 2018-01-01 | Stop reason: HOSPADM

## 2018-01-01 RX ORDER — METOPROLOL TARTRATE 5 MG/5ML
5 INJECTION INTRAVENOUS ONCE
Status: COMPLETED | OUTPATIENT
Start: 2018-01-01 | End: 2018-01-01

## 2018-01-01 RX ORDER — METOPROLOL TARTRATE 5 MG/5ML
INJECTION INTRAVENOUS AS NEEDED
Status: DISCONTINUED | OUTPATIENT
Start: 2018-01-01 | End: 2018-01-01 | Stop reason: HOSPADM

## 2018-01-01 RX ORDER — SODIUM CHLORIDE 0.9 % (FLUSH) 0.9 %
5-10 SYRINGE (ML) INJECTION AS NEEDED
Status: CANCELLED | OUTPATIENT
Start: 2018-01-01

## 2018-01-01 RX ORDER — SODIUM POLYSTYRENE SULFONATE 15 G/60ML
15 SUSPENSION ORAL; RECTAL
Status: COMPLETED | OUTPATIENT
Start: 2018-01-01 | End: 2018-01-01

## 2018-01-01 RX ORDER — SODIUM CHLORIDE 9 MG/ML
250 INJECTION, SOLUTION INTRAVENOUS AS NEEDED
Status: DISCONTINUED | OUTPATIENT
Start: 2018-01-01 | End: 2018-01-01 | Stop reason: HOSPADM

## 2018-01-01 RX ORDER — NEOSTIGMINE METHYLSULFATE 1 MG/ML
INJECTION INTRAVENOUS AS NEEDED
Status: DISCONTINUED | OUTPATIENT
Start: 2018-01-01 | End: 2018-01-01 | Stop reason: HOSPADM

## 2018-01-01 RX ORDER — RISPERIDONE 1 MG/1
1 TABLET, FILM COATED ORAL 4 TIMES DAILY
Status: DISCONTINUED | OUTPATIENT
Start: 2018-01-01 | End: 2018-01-01 | Stop reason: HOSPADM

## 2018-01-01 RX ORDER — ATORVASTATIN CALCIUM 10 MG/1
20 TABLET, FILM COATED ORAL DAILY
Status: DISCONTINUED | OUTPATIENT
Start: 2018-01-01 | End: 2018-01-01

## 2018-01-01 RX ORDER — GLYCOPYRROLATE 0.2 MG/ML
INJECTION INTRAMUSCULAR; INTRAVENOUS AS NEEDED
Status: DISCONTINUED | OUTPATIENT
Start: 2018-01-01 | End: 2018-01-01 | Stop reason: HOSPADM

## 2018-01-01 RX ORDER — DEXAMETHASONE SODIUM PHOSPHATE 4 MG/ML
INJECTION, SOLUTION INTRA-ARTICULAR; INTRALESIONAL; INTRAMUSCULAR; INTRAVENOUS; SOFT TISSUE AS NEEDED
Status: DISCONTINUED | OUTPATIENT
Start: 2018-01-01 | End: 2018-01-01 | Stop reason: HOSPADM

## 2018-01-01 RX ORDER — DESVENLAFAXINE SUCCINATE 50 MG/1
50 TABLET, EXTENDED RELEASE ORAL DAILY
Status: DISCONTINUED | OUTPATIENT
Start: 2018-01-01 | End: 2018-01-01 | Stop reason: HOSPADM

## 2018-01-01 RX ORDER — LEVOTHYROXINE SODIUM 200 UG/1
200 TABLET ORAL
Qty: 30 TAB | Refills: 0 | Status: SHIPPED | OUTPATIENT
Start: 2018-01-01 | End: 2018-01-01

## 2018-01-01 RX ORDER — MAGNESIUM SULFATE HEPTAHYDRATE 40 MG/ML
2 INJECTION, SOLUTION INTRAVENOUS ONCE
Status: COMPLETED | OUTPATIENT
Start: 2018-01-01 | End: 2018-01-01

## 2018-01-01 RX ORDER — INSULIN LISPRO 100 [IU]/ML
INJECTION, SOLUTION INTRAVENOUS; SUBCUTANEOUS
Status: DISCONTINUED | OUTPATIENT
Start: 2018-01-01 | End: 2018-01-01 | Stop reason: HOSPADM

## 2018-01-01 RX ORDER — SODIUM CHLORIDE 0.9 % (FLUSH) 0.9 %
20 SYRINGE (ML) INJECTION EVERY 8 HOURS
Status: DISCONTINUED | OUTPATIENT
Start: 2018-01-01 | End: 2018-01-01 | Stop reason: HOSPADM

## 2018-01-01 RX ORDER — METOPROLOL TARTRATE 25 MG/1
25 TABLET, FILM COATED ORAL ONCE
Status: COMPLETED | OUTPATIENT
Start: 2018-01-01 | End: 2018-01-01

## 2018-01-01 RX ORDER — ALPRAZOLAM 0.5 MG/1
1 TABLET ORAL
Status: DISCONTINUED | OUTPATIENT
Start: 2018-01-01 | End: 2018-01-01 | Stop reason: HOSPADM

## 2018-01-01 RX ADMIN — Medication 10 ML: at 17:49

## 2018-01-01 RX ADMIN — RISPERIDONE 1 MG: 0.5 TABLET, FILM COATED ORAL at 07:54

## 2018-01-01 RX ADMIN — DEXAMETHASONE SODIUM PHOSPHATE 4 MG: 4 INJECTION, SOLUTION INTRA-ARTICULAR; INTRALESIONAL; INTRAMUSCULAR; INTRAVENOUS; SOFT TISSUE at 15:54

## 2018-01-01 RX ADMIN — RISPERIDONE 1 MG: 0.5 TABLET, FILM COATED ORAL at 08:03

## 2018-01-01 RX ADMIN — POLYETHYLENE GLYCOL (3350) 17 G: 17 POWDER, FOR SOLUTION ORAL at 08:00

## 2018-01-01 RX ADMIN — RISPERIDONE 1 MG: 0.5 TABLET, FILM COATED ORAL at 07:57

## 2018-01-01 RX ADMIN — PIPERACILLIN SODIUM,TAZOBACTAM SODIUM 3.38 G: 3; .375 INJECTION, POWDER, FOR SOLUTION INTRAVENOUS at 05:56

## 2018-01-01 RX ADMIN — RISPERIDONE 1 MG: 1 TABLET ORAL at 09:57

## 2018-01-01 RX ADMIN — Medication 600 UNITS: at 06:06

## 2018-01-01 RX ADMIN — ROCURONIUM BROMIDE 40 MG: 10 INJECTION, SOLUTION INTRAVENOUS at 15:15

## 2018-01-01 RX ADMIN — Medication 20 ML: at 06:20

## 2018-01-01 RX ADMIN — FLUDROCORTISONE ACETATE 100 MCG: 0.1 TABLET ORAL at 07:53

## 2018-01-01 RX ADMIN — Medication 10 ML: at 19:29

## 2018-01-01 RX ADMIN — QUETIAPINE FUMARATE 600 MG: 100 TABLET ORAL at 22:18

## 2018-01-01 RX ADMIN — METOROPROLOL TARTRATE 5 MG: 5 INJECTION, SOLUTION INTRAVENOUS at 16:16

## 2018-01-01 RX ADMIN — NEOSTIGMINE METHYLSULFATE 3 MG: 1 INJECTION INTRAVENOUS at 16:01

## 2018-01-01 RX ADMIN — SODIUM CHLORIDE, SODIUM LACTATE, POTASSIUM CHLORIDE, AND CALCIUM CHLORIDE 1000 ML: 600; 310; 30; 20 INJECTION, SOLUTION INTRAVENOUS at 14:00

## 2018-01-01 RX ADMIN — ACETAMINOPHEN 650 MG: 325 TABLET ORAL at 23:04

## 2018-01-01 RX ADMIN — ALPRAZOLAM 0.5 MG: 0.5 TABLET ORAL at 14:28

## 2018-01-01 RX ADMIN — PIPERACILLIN SODIUM,TAZOBACTAM SODIUM 3.38 G: 3; .375 INJECTION, POWDER, FOR SOLUTION INTRAVENOUS at 22:34

## 2018-01-01 RX ADMIN — RISPERIDONE 1 MG: 0.5 TABLET, FILM COATED ORAL at 17:30

## 2018-01-01 RX ADMIN — Medication 10 ML: at 15:47

## 2018-01-01 RX ADMIN — Medication 20 ML: at 08:03

## 2018-01-01 RX ADMIN — Medication 10 ML: at 07:10

## 2018-01-01 RX ADMIN — SODIUM CHLORIDE 100 ML/HR: 900 INJECTION, SOLUTION INTRAVENOUS at 06:37

## 2018-01-01 RX ADMIN — ATORVASTATIN CALCIUM 20 MG: 10 TABLET, FILM COATED ORAL at 09:57

## 2018-01-01 RX ADMIN — PANTOPRAZOLE SODIUM 40 MG: 40 TABLET, DELAYED RELEASE ORAL at 16:46

## 2018-01-01 RX ADMIN — Medication 20 ML: at 00:03

## 2018-01-01 RX ADMIN — GLYCOPYRROLATE 0.4 MG: 0.2 INJECTION INTRAMUSCULAR; INTRAVENOUS at 16:01

## 2018-01-01 RX ADMIN — RISPERIDONE 1 MG: 0.5 TABLET, FILM COATED ORAL at 21:07

## 2018-01-01 RX ADMIN — PROPOFOL 300 MCG/KG/MIN: 10 INJECTION, EMULSION INTRAVENOUS at 10:35

## 2018-01-01 RX ADMIN — PIPERACILLIN SODIUM,TAZOBACTAM SODIUM 3.38 G: 3; .375 INJECTION, POWDER, FOR SOLUTION INTRAVENOUS at 13:03

## 2018-01-01 RX ADMIN — Medication 600 UNITS: at 17:31

## 2018-01-01 RX ADMIN — Medication 600 UNITS: at 21:14

## 2018-01-01 RX ADMIN — HYDRALAZINE HYDROCHLORIDE 10 MG: 20 INJECTION INTRAMUSCULAR; INTRAVENOUS at 16:03

## 2018-01-01 RX ADMIN — LEVOTHYROXINE SODIUM 200 MCG: 200 TABLET ORAL at 06:57

## 2018-01-01 RX ADMIN — HEPARIN SODIUM 5000 UNITS: 5000 INJECTION, SOLUTION INTRAVENOUS; SUBCUTANEOUS at 17:36

## 2018-01-01 RX ADMIN — SODIUM CHLORIDE 40 MG: 9 INJECTION INTRAMUSCULAR; INTRAVENOUS; SUBCUTANEOUS at 10:14

## 2018-01-01 RX ADMIN — HEPARIN SODIUM 5000 UNITS: 5000 INJECTION, SOLUTION INTRAVENOUS; SUBCUTANEOUS at 18:07

## 2018-01-01 RX ADMIN — DESVENLAFAXINE SUCCINATE 50 MG: 50 TABLET, EXTENDED RELEASE ORAL at 08:48

## 2018-01-01 RX ADMIN — Medication 20 ML: at 21:55

## 2018-01-01 RX ADMIN — Medication 600 UNITS: at 05:44

## 2018-01-01 RX ADMIN — ALPRAZOLAM 1 MG: 0.5 TABLET ORAL at 18:29

## 2018-01-01 RX ADMIN — PANTOPRAZOLE SODIUM 40 MG: 40 TABLET, DELAYED RELEASE ORAL at 11:23

## 2018-01-01 RX ADMIN — RISPERIDONE 1 MG: 0.5 TABLET, FILM COATED ORAL at 17:00

## 2018-01-01 RX ADMIN — PROPOFOL 180 MG: 10 INJECTION, EMULSION INTRAVENOUS at 15:18

## 2018-01-01 RX ADMIN — ONDANSETRON 4 MG: 2 INJECTION INTRAMUSCULAR; INTRAVENOUS at 15:54

## 2018-01-01 RX ADMIN — SODIUM CHLORIDE 40 MG: 9 INJECTION INTRAMUSCULAR; INTRAVENOUS; SUBCUTANEOUS at 22:02

## 2018-01-01 RX ADMIN — QUETIAPINE FUMARATE 600 MG: 100 TABLET ORAL at 21:07

## 2018-01-01 RX ADMIN — Medication 20 ML: at 18:17

## 2018-01-01 RX ADMIN — PANTOPRAZOLE SODIUM 40 MG: 40 TABLET, DELAYED RELEASE ORAL at 08:03

## 2018-01-01 RX ADMIN — HEPARIN SODIUM 5000 UNITS: 5000 INJECTION, SOLUTION INTRAVENOUS; SUBCUTANEOUS at 06:15

## 2018-01-01 RX ADMIN — HYDRALAZINE HYDROCHLORIDE 10 MG: 20 INJECTION INTRAMUSCULAR; INTRAVENOUS at 16:08

## 2018-01-01 RX ADMIN — QUETIAPINE FUMARATE 600 MG: 100 TABLET ORAL at 21:00

## 2018-01-01 RX ADMIN — SODIUM CHLORIDE, SODIUM LACTATE, POTASSIUM CHLORIDE, AND CALCIUM CHLORIDE 100 ML/HR: 600; 310; 30; 20 INJECTION, SOLUTION INTRAVENOUS at 13:47

## 2018-01-01 RX ADMIN — Medication 600 UNITS: at 13:06

## 2018-01-01 RX ADMIN — LEVOTHYROXINE SODIUM 200 MCG: 50 TABLET ORAL at 11:23

## 2018-01-01 RX ADMIN — CALCIUM CARBONATE 500 MG (1,250 MG)-VITAMIN D3 200 UNIT TABLET 1 TABLET: at 09:18

## 2018-01-01 RX ADMIN — Medication 600 UNITS: at 06:16

## 2018-01-01 RX ADMIN — ONDANSETRON 4 MG: 2 INJECTION INTRAMUSCULAR; INTRAVENOUS at 16:03

## 2018-01-01 RX ADMIN — PANTOPRAZOLE SODIUM 40 MG: 40 TABLET, DELAYED RELEASE ORAL at 08:44

## 2018-01-01 RX ADMIN — HYDRALAZINE HYDROCHLORIDE 10 MG: 20 INJECTION INTRAMUSCULAR; INTRAVENOUS at 23:48

## 2018-01-01 RX ADMIN — PROPOFOL 30 MG: 10 INJECTION, EMULSION INTRAVENOUS at 15:26

## 2018-01-01 RX ADMIN — Medication 600 UNITS: at 18:16

## 2018-01-01 RX ADMIN — SODIUM CHLORIDE 40 MG: 9 INJECTION INTRAMUSCULAR; INTRAVENOUS; SUBCUTANEOUS at 09:20

## 2018-01-01 RX ADMIN — PIPERACILLIN SODIUM,TAZOBACTAM SODIUM 3.38 G: 3; .375 INJECTION, POWDER, FOR SOLUTION INTRAVENOUS at 17:26

## 2018-01-01 RX ADMIN — RISPERIDONE 1 MG: 0.5 TABLET, FILM COATED ORAL at 17:10

## 2018-01-01 RX ADMIN — PANTOPRAZOLE SODIUM 40 MG: 40 TABLET, DELAYED RELEASE ORAL at 17:00

## 2018-01-01 RX ADMIN — POTASSIUM CHLORIDE 40 MEQ: 1500 TABLET, EXTENDED RELEASE ORAL at 22:34

## 2018-01-01 RX ADMIN — ONDANSETRON 4 MG: 2 INJECTION INTRAMUSCULAR; INTRAVENOUS at 19:37

## 2018-01-01 RX ADMIN — RISPERIDONE 1 MG: 1 TABLET ORAL at 21:52

## 2018-01-01 RX ADMIN — ACETAMINOPHEN 650 MG: 325 TABLET ORAL at 15:47

## 2018-01-01 RX ADMIN — MAGNESIUM SULFATE HEPTAHYDRATE 2 G: 40 INJECTION, SOLUTION INTRAVENOUS at 21:51

## 2018-01-01 RX ADMIN — POLYETHYLENE GLYCOL (3350) 17 G: 17 POWDER, FOR SOLUTION ORAL at 08:45

## 2018-01-01 RX ADMIN — ALPRAZOLAM 0.5 MG: 0.5 TABLET ORAL at 21:52

## 2018-01-01 RX ADMIN — HEPARIN SODIUM 5000 UNITS: 5000 INJECTION, SOLUTION INTRAVENOUS; SUBCUTANEOUS at 17:05

## 2018-01-01 RX ADMIN — HEPARIN SODIUM 5000 UNITS: 5000 INJECTION, SOLUTION INTRAVENOUS; SUBCUTANEOUS at 06:46

## 2018-01-01 RX ADMIN — LEVOTHYROXINE SODIUM 224 MCG: 112 TABLET ORAL at 06:24

## 2018-01-01 RX ADMIN — Medication 10 ML: at 14:00

## 2018-01-01 RX ADMIN — SODIUM CHLORIDE, SODIUM LACTATE, POTASSIUM CHLORIDE, AND CALCIUM CHLORIDE 100 ML/HR: 600; 310; 30; 20 INJECTION, SOLUTION INTRAVENOUS at 10:51

## 2018-01-01 RX ADMIN — DESVENLAFAXINE SUCCINATE 50 MG: 50 TABLET, EXTENDED RELEASE ORAL at 08:08

## 2018-01-01 RX ADMIN — RISPERIDONE 1 MG: 0.5 TABLET, FILM COATED ORAL at 21:57

## 2018-01-01 RX ADMIN — FLUDROCORTISONE ACETATE 100 MCG: 0.1 TABLET ORAL at 10:14

## 2018-01-01 RX ADMIN — POTASSIUM CHLORIDE 40 MEQ: 1500 TABLET, EXTENDED RELEASE ORAL at 13:20

## 2018-01-01 RX ADMIN — Medication 20 ML: at 05:57

## 2018-01-01 RX ADMIN — PANTOPRAZOLE SODIUM 40 MG: 40 TABLET, DELAYED RELEASE ORAL at 18:12

## 2018-01-01 RX ADMIN — Medication 10 ML: at 06:20

## 2018-01-01 RX ADMIN — RISPERIDONE 1 MG: 0.5 TABLET, FILM COATED ORAL at 12:41

## 2018-01-01 RX ADMIN — LIDOCAINE HYDROCHLORIDE 80 MG: 20 INJECTION, SOLUTION EPIDURAL; INFILTRATION; INTRACAUDAL; PERINEURAL at 10:35

## 2018-01-01 RX ADMIN — HYDRALAZINE HYDROCHLORIDE 10 MG: 20 INJECTION INTRAMUSCULAR; INTRAVENOUS at 05:45

## 2018-01-01 RX ADMIN — PANTOPRAZOLE SODIUM 40 MG: 40 TABLET, DELAYED RELEASE ORAL at 07:55

## 2018-01-01 RX ADMIN — CALCIUM CARBONATE 500 MG (1,250 MG)-VITAMIN D3 200 UNIT TABLET 1 TABLET: at 08:03

## 2018-01-01 RX ADMIN — IOPAMIDOL 100 ML: 755 INJECTION, SOLUTION INTRAVENOUS at 10:07

## 2018-01-01 RX ADMIN — FLUDROCORTISONE ACETATE 100 MCG: 0.1 TABLET ORAL at 07:57

## 2018-01-01 RX ADMIN — QUETIAPINE FUMARATE 600 MG: 100 TABLET ORAL at 21:53

## 2018-01-01 RX ADMIN — Medication 600 UNITS: at 21:55

## 2018-01-01 RX ADMIN — Medication 600 UNITS: at 13:59

## 2018-01-01 RX ADMIN — RISPERIDONE 1 MG: 0.5 TABLET, FILM COATED ORAL at 17:36

## 2018-01-01 RX ADMIN — PROPOFOL 150 MG: 10 INJECTION, EMULSION INTRAVENOUS at 15:14

## 2018-01-01 RX ADMIN — RISPERIDONE 1 MG: 1 TABLET ORAL at 17:59

## 2018-01-01 RX ADMIN — POLYETHYLENE GLYCOL (3350) 17 G: 17 POWDER, FOR SOLUTION ORAL at 12:03

## 2018-01-01 RX ADMIN — PANTOPRAZOLE SODIUM 40 MG: 40 TABLET, DELAYED RELEASE ORAL at 08:04

## 2018-01-01 RX ADMIN — RISPERIDONE 1 MG: 0.5 TABLET, FILM COATED ORAL at 13:00

## 2018-01-01 RX ADMIN — LEVOTHYROXINE SODIUM 200 MCG: 50 TABLET ORAL at 07:54

## 2018-01-01 RX ADMIN — HEPARIN SODIUM 5000 UNITS: 5000 INJECTION, SOLUTION INTRAVENOUS; SUBCUTANEOUS at 05:56

## 2018-01-01 RX ADMIN — RISPERIDONE 1 MG: 0.5 TABLET, FILM COATED ORAL at 11:22

## 2018-01-01 RX ADMIN — FLUDROCORTISONE ACETATE 100 MCG: 0.1 TABLET ORAL at 09:20

## 2018-01-01 RX ADMIN — PIPERACILLIN SODIUM,TAZOBACTAM SODIUM 3.38 G: 3; .375 INJECTION, POWDER, FOR SOLUTION INTRAVENOUS at 16:59

## 2018-01-01 RX ADMIN — CALCIUM CARBONATE 500 MG (1,250 MG)-VITAMIN D3 200 UNIT TABLET 1 TABLET: at 07:52

## 2018-01-01 RX ADMIN — Medication 5 ML: at 21:02

## 2018-01-01 RX ADMIN — RISPERIDONE 1 MG: 1 TABLET ORAL at 09:20

## 2018-01-01 RX ADMIN — POLYETHYLENE GLYCOL (3350) 17 G: 17 POWDER, FOR SOLUTION ORAL at 21:07

## 2018-01-01 RX ADMIN — SODIUM CHLORIDE 100 ML/HR: 900 INJECTION, SOLUTION INTRAVENOUS at 19:27

## 2018-01-01 RX ADMIN — RISPERIDONE 1 MG: 0.5 TABLET, FILM COATED ORAL at 09:18

## 2018-01-01 RX ADMIN — DESVENLAFAXINE SUCCINATE 50 MG: 50 TABLET, EXTENDED RELEASE ORAL at 11:22

## 2018-01-01 RX ADMIN — RISPERIDONE 1 MG: 1 TABLET ORAL at 17:36

## 2018-01-01 RX ADMIN — SODIUM PHOSPHATE 118 ML: 7; 19 ENEMA RECTAL at 09:09

## 2018-01-01 RX ADMIN — Medication 20 ML: at 17:32

## 2018-01-01 RX ADMIN — Medication 20 ML: at 06:18

## 2018-01-01 RX ADMIN — QUETIAPINE FUMARATE 600 MG: 100 TABLET ORAL at 22:34

## 2018-01-01 RX ADMIN — CEFTRIAXONE SODIUM 1 G: 1 INJECTION, POWDER, FOR SOLUTION INTRAMUSCULAR; INTRAVENOUS at 23:22

## 2018-01-01 RX ADMIN — CALCIUM CARBONATE 500 MG (1,250 MG)-VITAMIN D3 200 UNIT TABLET 1 TABLET: at 11:23

## 2018-01-01 RX ADMIN — PANTOPRAZOLE SODIUM 40 MG: 40 TABLET, DELAYED RELEASE ORAL at 06:17

## 2018-01-01 RX ADMIN — RISPERIDONE 1 MG: 0.5 TABLET, FILM COATED ORAL at 22:34

## 2018-01-01 RX ADMIN — Medication 10 ML: at 22:19

## 2018-01-01 RX ADMIN — LIDOCAINE HYDROCHLORIDE 100 MG: 20 INJECTION, SOLUTION EPIDURAL; INFILTRATION; INTRACAUDAL; PERINEURAL at 15:14

## 2018-01-01 RX ADMIN — ALPRAZOLAM 1 MG: 0.5 TABLET ORAL at 18:56

## 2018-01-01 RX ADMIN — POLYETHYLENE GLYCOL (3350) 17 G: 17 POWDER, FOR SOLUTION ORAL at 17:10

## 2018-01-01 RX ADMIN — LIDOCAINE HYDROCHLORIDE 40 MG: 20 INJECTION, SOLUTION EPIDURAL; INFILTRATION; INTRACAUDAL; PERINEURAL at 15:20

## 2018-01-01 RX ADMIN — RISPERIDONE 1 MG: 1 TABLET ORAL at 12:38

## 2018-01-01 RX ADMIN — PROPOFOL 50 MG: 10 INJECTION, EMULSION INTRAVENOUS at 10:35

## 2018-01-01 RX ADMIN — QUETIAPINE FUMARATE 600 MG: 100 TABLET ORAL at 21:51

## 2018-01-01 RX ADMIN — Medication 20 ML: at 13:07

## 2018-01-01 RX ADMIN — SODIUM PHOSPHATE 118 ML: 7; 19 ENEMA RECTAL at 10:23

## 2018-01-01 RX ADMIN — FLUDROCORTISONE ACETATE 100 MCG: 0.1 TABLET ORAL at 11:23

## 2018-01-01 RX ADMIN — HEPARIN SODIUM 5000 UNITS: 5000 INJECTION, SOLUTION INTRAVENOUS; SUBCUTANEOUS at 05:24

## 2018-01-01 RX ADMIN — DESVENLAFAXINE SUCCINATE 50 MG: 50 TABLET, EXTENDED RELEASE ORAL at 07:52

## 2018-01-01 RX ADMIN — METOPROLOL TARTRATE 25 MG: 25 TABLET ORAL at 00:15

## 2018-01-01 RX ADMIN — DESVENLAFAXINE SUCCINATE 50 MG: 50 TABLET, EXTENDED RELEASE ORAL at 09:18

## 2018-01-01 RX ADMIN — QUETIAPINE FUMARATE 600 MG: 100 TABLET ORAL at 21:14

## 2018-01-01 RX ADMIN — RISPERIDONE 1 MG: 0.5 TABLET, FILM COATED ORAL at 13:58

## 2018-01-01 RX ADMIN — Medication 20 ML: at 22:19

## 2018-01-01 RX ADMIN — ASPIRIN 81 MG: 81 TABLET, COATED ORAL at 21:52

## 2018-01-01 RX ADMIN — FENTANYL CITRATE 25 MCG: 50 INJECTION, SOLUTION INTRAMUSCULAR; INTRAVENOUS at 15:44

## 2018-01-01 RX ADMIN — SODIUM POLYSTYRENE SULFONATE 15 G: 15 SUSPENSION ORAL; RECTAL at 17:01

## 2018-01-01 RX ADMIN — HEPARIN SODIUM 5000 UNITS: 5000 INJECTION, SOLUTION INTRAVENOUS; SUBCUTANEOUS at 18:19

## 2018-01-01 RX ADMIN — Medication 10 ML: at 22:35

## 2018-01-01 RX ADMIN — SODIUM PHOSPHATE 118 ML: 7; 19 ENEMA RECTAL at 06:06

## 2018-01-01 RX ADMIN — HEPARIN SODIUM 5000 UNITS: 5000 INJECTION, SOLUTION INTRAVENOUS; SUBCUTANEOUS at 05:44

## 2018-01-01 RX ADMIN — LEVOTHYROXINE SODIUM 200 MCG: 50 TABLET ORAL at 08:04

## 2018-01-01 RX ADMIN — FENTANYL CITRATE 25 MCG: 50 INJECTION, SOLUTION INTRAMUSCULAR; INTRAVENOUS at 15:47

## 2018-01-01 RX ADMIN — HEPARIN SODIUM 5000 UNITS: 5000 INJECTION, SOLUTION INTRAVENOUS; SUBCUTANEOUS at 17:10

## 2018-01-01 RX ADMIN — Medication 300 UNITS: at 22:00

## 2018-01-01 RX ADMIN — RISPERIDONE 1 MG: 0.5 TABLET, FILM COATED ORAL at 18:08

## 2018-01-01 RX ADMIN — DESVENLAFAXINE SUCCINATE 50 MG: 50 TABLET, EXTENDED RELEASE ORAL at 07:57

## 2018-01-01 RX ADMIN — CALCIUM CARBONATE 500 MG (1,250 MG)-VITAMIN D3 200 UNIT TABLET 1 TABLET: at 07:57

## 2018-01-01 RX ADMIN — Medication 10 ML: at 21:15

## 2018-01-01 RX ADMIN — CALCIUM CARBONATE 500 MG (1,250 MG)-VITAMIN D3 200 UNIT TABLET 1 TABLET: at 08:44

## 2018-01-01 RX ADMIN — Medication 20 ML: at 22:35

## 2018-01-01 RX ADMIN — POTASSIUM CHLORIDE 40 MEQ: 20 TABLET, EXTENDED RELEASE ORAL at 17:00

## 2018-01-01 RX ADMIN — FLUDROCORTISONE ACETATE 100 MCG: 0.1 TABLET ORAL at 08:44

## 2018-01-01 RX ADMIN — POTASSIUM CHLORIDE 60 MEQ: 1500 TABLET, EXTENDED RELEASE ORAL at 17:51

## 2018-01-01 RX ADMIN — POLYETHYLENE GLYCOL (3350) 17 G: 17 POWDER, FOR SOLUTION ORAL at 08:04

## 2018-01-01 RX ADMIN — ATORVASTATIN CALCIUM 20 MG: 10 TABLET, FILM COATED ORAL at 10:13

## 2018-01-01 RX ADMIN — ACETAMINOPHEN 650 MG: 325 TABLET ORAL at 19:17

## 2018-01-01 RX ADMIN — SODIUM CHLORIDE 125 MG: 900 INJECTION, SOLUTION INTRAVENOUS at 15:39

## 2018-01-01 RX ADMIN — RISPERIDONE 1 MG: 0.5 TABLET, FILM COATED ORAL at 18:13

## 2018-01-01 RX ADMIN — SODIUM CHLORIDE 100 ML/HR: 900 INJECTION, SOLUTION INTRAVENOUS at 04:40

## 2018-01-01 RX ADMIN — RISPERIDONE 1 MG: 1 TABLET ORAL at 12:55

## 2018-01-01 RX ADMIN — SODIUM CHLORIDE, SODIUM LACTATE, POTASSIUM CHLORIDE, AND CALCIUM CHLORIDE 1000 ML: 600; 310; 30; 20 INJECTION, SOLUTION INTRAVENOUS at 11:00

## 2018-01-01 RX ADMIN — Medication 20 ML: at 14:00

## 2018-01-01 RX ADMIN — FENTANYL CITRATE 25 MCG: 50 INJECTION, SOLUTION INTRAMUSCULAR; INTRAVENOUS at 15:54

## 2018-01-01 RX ADMIN — QUETIAPINE FUMARATE 600 MG: 100 TABLET ORAL at 21:57

## 2018-01-01 RX ADMIN — SODIUM CHLORIDE 1000 ML: 900 INJECTION, SOLUTION INTRAVENOUS at 16:15

## 2018-01-01 RX ADMIN — SODIUM CHLORIDE 40 MG: 9 INJECTION INTRAMUSCULAR; INTRAVENOUS; SUBCUTANEOUS at 20:10

## 2018-01-01 RX ADMIN — METRONIDAZOLE 500 MG: 500 TABLET ORAL at 23:22

## 2018-01-01 RX ADMIN — Medication 20 ML: at 21:14

## 2018-01-01 RX ADMIN — LEVOTHYROXINE SODIUM 200 MCG: 50 TABLET ORAL at 08:03

## 2018-01-01 RX ADMIN — SODIUM CHLORIDE 100 ML: 900 INJECTION, SOLUTION INTRAVENOUS at 19:29

## 2018-01-01 RX ADMIN — POLYETHYLENE GLYCOL (3350) 17 G: 17 POWDER, FOR SOLUTION ORAL at 09:20

## 2018-01-01 RX ADMIN — METOPROLOL TARTRATE 2.5 MG: 5 INJECTION INTRAVENOUS at 16:15

## 2018-01-01 RX ADMIN — Medication 10 ML: at 10:07

## 2018-01-01 RX ADMIN — RISPERIDONE 1 MG: 1 TABLET ORAL at 21:57

## 2018-01-01 RX ADMIN — RISPERIDONE 1 MG: 1 TABLET ORAL at 10:15

## 2018-01-01 RX ADMIN — SODIUM CHLORIDE, SODIUM LACTATE, POTASSIUM CHLORIDE, AND CALCIUM CHLORIDE: 600; 310; 30; 20 INJECTION, SOLUTION INTRAVENOUS at 10:34

## 2018-01-01 RX ADMIN — Medication 10 ML: at 08:03

## 2018-01-01 RX ADMIN — PROPOFOL 20 MG: 10 INJECTION, EMULSION INTRAVENOUS at 15:15

## 2018-01-01 RX ADMIN — SODIUM CHLORIDE 40 MG: 9 INJECTION INTRAMUSCULAR; INTRAVENOUS; SUBCUTANEOUS at 11:56

## 2018-01-01 RX ADMIN — GADOBENATE DIMEGLUMINE 10 ML: 529 INJECTION, SOLUTION INTRAVENOUS at 15:44

## 2018-01-01 RX ADMIN — PANTOPRAZOLE SODIUM 40 MG: 40 TABLET, DELAYED RELEASE ORAL at 17:10

## 2018-01-01 RX ADMIN — Medication 10 ML: at 05:57

## 2018-01-01 RX ADMIN — PIPERACILLIN SODIUM,TAZOBACTAM SODIUM 3.38 G: 3; .375 INJECTION, POWDER, FOR SOLUTION INTRAVENOUS at 18:23

## 2018-01-01 RX ADMIN — PROPOFOL 50 MG: 10 INJECTION, EMULSION INTRAVENOUS at 11:04

## 2018-01-01 RX ADMIN — POLYETHYLENE GLYCOL (3350) 17 G: 17 POWDER, FOR SOLUTION ORAL at 13:06

## 2018-01-01 RX ADMIN — LEVOTHYROXINE SODIUM 200 MCG: 50 TABLET ORAL at 06:16

## 2018-01-01 RX ADMIN — LIDOCAINE HYDROCHLORIDE 60 MG: 20 INJECTION, SOLUTION EPIDURAL; INFILTRATION; INTRACAUDAL; PERINEURAL at 15:18

## 2018-01-01 RX ADMIN — RISPERIDONE 1 MG: 0.5 TABLET, FILM COATED ORAL at 22:19

## 2018-01-01 RX ADMIN — Medication 20 ML: at 06:46

## 2018-01-01 RX ADMIN — DIATRIZOATE MEGLUMINE AND DIATRIZOATE SODIUM 15 ML: 660; 100 LIQUID ORAL; RECTAL at 06:32

## 2018-01-01 RX ADMIN — ATORVASTATIN CALCIUM 20 MG: 10 TABLET, FILM COATED ORAL at 09:19

## 2018-01-01 RX ADMIN — RISPERIDONE 1 MG: 0.5 TABLET, FILM COATED ORAL at 13:06

## 2018-01-01 RX ADMIN — LEVOTHYROXINE SODIUM 200 MCG: 50 TABLET ORAL at 08:44

## 2018-01-01 RX ADMIN — Medication 10 ML: at 00:03

## 2018-01-01 RX ADMIN — Medication 600 UNITS: at 02:06

## 2018-01-01 RX ADMIN — FLUDROCORTISONE ACETATE 100 MCG: 0.1 TABLET ORAL at 09:57

## 2018-01-01 RX ADMIN — FENTANYL CITRATE 25 MCG: 50 INJECTION, SOLUTION INTRAMUSCULAR; INTRAVENOUS at 15:51

## 2018-01-01 RX ADMIN — PIPERACILLIN SODIUM,TAZOBACTAM SODIUM 3.38 G: 3; .375 INJECTION, POWDER, FOR SOLUTION INTRAVENOUS at 02:34

## 2018-01-01 RX ADMIN — SODIUM CHLORIDE 40 MG: 9 INJECTION INTRAMUSCULAR; INTRAVENOUS; SUBCUTANEOUS at 21:06

## 2018-01-01 RX ADMIN — Medication 10 ML: at 17:01

## 2018-01-01 RX ADMIN — FLUDROCORTISONE ACETATE 100 MCG: 0.1 TABLET ORAL at 08:03

## 2018-01-01 RX ADMIN — FENTANYL CITRATE 50 MCG: 50 INJECTION, SOLUTION INTRAMUSCULAR; INTRAVENOUS at 15:14

## 2018-01-01 RX ADMIN — RISPERIDONE 1 MG: 1 TABLET ORAL at 13:56

## 2018-01-01 RX ADMIN — Medication 10 ML: at 15:39

## 2018-01-01 RX ADMIN — RISPERIDONE 1 MG: 1 TABLET ORAL at 21:00

## 2018-01-01 RX ADMIN — Medication 10 ML: at 15:44

## 2018-01-01 RX ADMIN — FLUDROCORTISONE ACETATE 100 MCG: 0.1 TABLET ORAL at 09:18

## 2018-01-01 RX ADMIN — PANTOPRAZOLE SODIUM 40 MG: 40 TABLET, DELAYED RELEASE ORAL at 18:08

## 2018-01-01 RX ADMIN — SODIUM CHLORIDE 100 ML: 900 INJECTION, SOLUTION INTRAVENOUS at 10:07

## 2018-01-01 RX ADMIN — RISPERIDONE 1 MG: 0.5 TABLET, FILM COATED ORAL at 21:53

## 2018-01-01 RX ADMIN — Medication 300 UNITS: at 22:19

## 2018-01-01 RX ADMIN — IOPAMIDOL 100 ML: 755 INJECTION, SOLUTION INTRAVENOUS at 19:29

## 2018-01-01 RX ADMIN — Medication 600 UNITS: at 06:46

## 2018-01-01 RX ADMIN — Medication 300 UNITS: at 05:56

## 2018-01-01 RX ADMIN — POTASSIUM CHLORIDE 40 MEQ: 1500 TABLET, EXTENDED RELEASE ORAL at 11:22

## 2018-01-01 RX ADMIN — POLYETHYLENE GLYCOL (3350) 17 G: 17 POWDER, FOR SOLUTION ORAL at 10:15

## 2018-01-01 RX ADMIN — RISPERIDONE 1 MG: 0.5 TABLET, FILM COATED ORAL at 21:14

## 2018-01-01 RX ADMIN — PROPOFOL 140 MCG/KG/MIN: 10 INJECTION, EMULSION INTRAVENOUS at 11:04

## 2018-01-01 RX ADMIN — RISPERIDONE 1 MG: 0.5 TABLET, FILM COATED ORAL at 08:44

## 2018-01-01 RX ADMIN — LEVOTHYROXINE SODIUM 200 MCG: 200 TABLET ORAL at 05:51

## 2018-05-31 PROBLEM — D64.9 ANEMIA: Chronic | Status: ACTIVE | Noted: 2018-01-01

## 2018-05-31 PROBLEM — K80.20 CHOLELITHIASIS: Status: ACTIVE | Noted: 2018-01-01

## 2018-05-31 PROBLEM — R73.9 HYPERGLYCEMIA: Chronic | Status: ACTIVE | Noted: 2018-01-01

## 2018-05-31 PROBLEM — E83.42 HYPOMAGNESEMIA: Status: ACTIVE | Noted: 2018-01-01

## 2018-05-31 PROBLEM — F81.9 COGNITIVE DEVELOPMENTAL DELAY: Chronic | Status: ACTIVE | Noted: 2018-01-01

## 2018-05-31 PROBLEM — R79.89 ELEVATED LIVER FUNCTION TESTS: Status: ACTIVE | Noted: 2018-01-01

## 2018-05-31 PROBLEM — R03.0 ELEVATED BLOOD PRESSURE READING: Status: ACTIVE | Noted: 2018-01-01

## 2018-05-31 NOTE — ED NOTES
TRANSFER - OUT REPORT:    Verbal report given to Adolfo Roman on [facility]  being transferred to [facility] for routine progression of care       Report consisted of patients Situation, Background, Assessment and   Recommendations(SBAR). Information from the following report(s) SBAR, MAR and Recent Results was reviewed with the receiving nurse. Lines:   Peripheral IV Right Forearm (Active)   Site Assessment Clean, dry, & intact 5/31/2018  4:04 PM   Phlebitis Assessment 0 5/31/2018  4:04 PM   Infiltration Assessment 0 5/31/2018  4:04 PM   Dressing Status Clean, dry, & intact 5/31/2018  4:04 PM   Dressing Type Transparent 5/31/2018  4:04 PM        Opportunity for questions and clarification was provided.

## 2018-05-31 NOTE — IP AVS SNAPSHOT
[address] 
[phone] Patient: Kailee Singh MRN: [id number] :1980 A check tessa indicates which time of day the medication should be taken. My Medications CHANGE how you take these medications Instructions Each Dose to Equal  
 Morning Noon Evening Bedtime  
 esomeprazole 40 mg capsule Commonly known as:  NexIUM What changed:   
- how much to take - when to take this 
- additional instructions Your last dose was: Your next dose is: Take 1 cap by mouth twice daily  Indications: gastroesophageal reflux disease  
     
   
   
   
  
 levothyroxine 200 mcg tablet Commonly known as:  SYNTHROID Start taking on:  2018 What changed:   
- medication strength 
- how much to take 
- additional instructions Your last dose was: Your next dose is: Take 1 Tab by mouth Daily (before breakfast). 200 mcg CONTINUE taking these medications Instructions Each Dose to Equal  
 Morning Noon Evening Bedtime  
 acetaminophen 500 mg tablet Commonly known as:  TYLENOL Your last dose was: Your next dose is: Take 1 Tab by mouth every six (6) hours as needed for Pain. 500 mg  
    
   
   
   
  
 ALPRAZolam 1 mg tablet Commonly known as:  Rosalene Alex Your last dose was: Your next dose is: Take 1 mg by mouth three (3) times daily as needed for Anxiety. 1 mg CALCIUM 600 WITH VITAMIN D3 600 mg(1,500mg) -400 unit Cap Generic drug:  Calcium-Cholecalciferol (D3) Your last dose was: Your next dose is: Take  by mouth. cholecalciferol (VITAMIN D3) 5,000 unit Tab tablet Commonly known as:  VITAMIN D3 Your last dose was: Your next dose is: Take  by mouth daily. fludrocortisone 0.1 mg tablet Commonly known as:  FLORINEF Your last dose was: Your next dose is: Take 1 Tab by mouth daily. 0.1 mg  
    
   
   
   
  
 metFORMIN 500 mg tablet Commonly known as:  GLUCOPHAGE Your last dose was: Your next dose is: Take  by mouth two (2) times daily (with meals). MIRALAX 17 gram/dose powder Generic drug:  polyethylene glycol Your last dose was: Your next dose is: Take 17 g by mouth daily. 17 g MULTIVITAMIN PO Your last dose was: Your next dose is: Take  by mouth. ofloxacin 0.3 % otic solution Commonly known as:  FLOXIN Your last dose was: Your next dose is:    
   
   
 Administer 5 Drops in left ear two (2) times a day. 5 Drop PRISTIQ 50 mg ER tablet Generic drug:  desvenlafaxine succinate Your last dose was: Your next dose is: Take 50 mg by mouth. 50 mg  
    
   
   
   
  
 risperiDONE 2 mg tablet Commonly known as:  RisperDAL Your last dose was: Your next dose is:    
   
   
 1 mg four (4) times daily. 1 mg SEROquel 300 mg tablet Generic drug:  QUEtiapine Your last dose was: Your next dose is: Take 600 mg by mouth nightly. Indications: DELUSIONS  
 600 mg  
    
   
   
   
  
 ZyrTEC 10 mg Cap Generic drug:  Cetirizine Your last dose was: Your next dose is: Take  by mouth daily as needed. Indications: ALLERGIC RHINITIS  
     
   
   
   
  
  
STOP taking these medications   
 aspirin delayed-release 81 mg tablet  
   
  
 atorvastatin 20 mg tablet Commonly known as:  LIPITOR Where to Get Your Medications Information on where to get these meds will be given to you by the nurse or doctor. ! Ask your nurse or doctor about these medications  
  esomeprazole 40 mg capsule  
 levothyroxine 200 mcg tablet

## 2018-05-31 NOTE — IP AVS SNAPSHOT
[address] 
[phone] Patient: Cande Weir MRN: [id number] :1980 About your hospitalization You were admitted on:  May 31, 2018 You last received care in the:  [facility] SURGICAL You were discharged on:  Manuela 3, 2018 Why you were hospitalized Your primary diagnosis was:  Gastric Erosion Your diagnoses also included:  Cognitive Developmental Delay, Elevated Liver Function Tests, Anemia, Elevated Blood Pressure Reading, Hypomagnesemia, Gerd (Gastroesophageal Reflux Disease), Depression, Hypothyroidism, Ocd (Obsessive Compulsive Disorder), Hyperglycemia, Cholelithiasis, Esophagitis, Gastroparesis Follow-up Information Follow up With Details Comments Contact Info Swapna Valdivia MD   [address] [place] [address] 
[phone] [facility]  Call on Monday to set up a follow up appointment within 3-5 days for HIDA and gastroparesis. [phone] [place] [address] [address] [address] 
[phone] Your Scheduled Appointments 2018  8:30 AM EDT  
[facility] HEPATOBILIARY W INTERVENT with [facility] SCAN RM  
[facility] RADIOLOGY NUCLEAR MEDICINE ([address] [place] [address]) [address] [place] [address]  
[phone] NIGHT BEFORE PROCEDURE: Eat a normal dinner. Have a glass of milk or a cup of ice cream between 8-9 pm THE NIGHT BEFORE THE EXAM.  Nothing to eat or drink 4-6 hours prior to appointment. No pain or stomach medication 6 hours prior to appointment. PATIENT ARRIVAL Please report 30 minutes early to check in.  
  
    
[address], [address] [place] [address]- 2nd floor of Outpatient Medical Office Building Discharge Orders None A check tessa indicates which time of day the medication should be taken. My Medications CHANGE how you take these medications Instructions Each Dose to Equal  
 Morning Noon Evening Bedtime  
 esomeprazole 40 mg capsule Commonly known as:  NexIUM What changed:   
- how much to take - when to take this 
- additional instructions Your last dose was: Your next dose is: Take 1 cap by mouth twice daily  Indications: gastroesophageal reflux disease  
     
   
   
   
  
 levothyroxine 200 mcg tablet Commonly known as:  SYNTHROID Start taking on:  6/4/2018 What changed:   
- medication strength 
- how much to take 
- additional instructions Your last dose was: Your next dose is: Take 1 Tab by mouth Daily (before breakfast). 200 mcg CONTINUE taking these medications Instructions Each Dose to Equal  
 Morning Noon Evening Bedtime  
 acetaminophen 500 mg tablet Commonly known as:  TYLENOL Your last dose was: Your next dose is: Take 1 Tab by mouth every six (6) hours as needed for Pain. 500 mg  
    
   
   
   
  
 ALPRAZolam 1 mg tablet Commonly known as:  Sudha Ream Your last dose was: Your next dose is: Take 1 mg by mouth three (3) times daily as needed for Anxiety. 1 mg CALCIUM 600 WITH VITAMIN D3 600 mg(1,500mg) -400 unit Cap Generic drug:  Calcium-Cholecalciferol (D3) Your last dose was: Your next dose is: Take  by mouth. cholecalciferol (VITAMIN D3) 5,000 unit Tab tablet Commonly known as:  VITAMIN D3 Your last dose was: Your next dose is: Take  by mouth daily. fludrocortisone 0.1 mg tablet Commonly known as:  FLORINEF Your last dose was: Your next dose is: Take 1 Tab by mouth daily. 0.1 mg  
    
   
   
   
  
 metFORMIN 500 mg tablet Commonly known as:  GLUCOPHAGE Your last dose was: Your next dose is: Take  by mouth two (2) times daily (with meals). MIRALAX 17 gram/dose powder Generic drug:  polyethylene glycol Your last dose was: Your next dose is: Take 17 g by mouth daily. 17 g MULTIVITAMIN PO Your last dose was: Your next dose is: Take  by mouth. ofloxacin 0.3 % otic solution Commonly known as:  FLOXIN Your last dose was: Your next dose is:    
   
   
 Administer 5 Drops in left ear two (2) times a day. 5 Drop PRISTIQ 50 mg ER tablet Generic drug:  desvenlafaxine succinate Your last dose was: Your next dose is: Take 50 mg by mouth. 50 mg  
    
   
   
   
  
 risperiDONE 2 mg tablet Commonly known as:  RisperDAL Your last dose was: Your next dose is:    
   
   
 1 mg four (4) times daily. 1 mg SEROquel 300 mg tablet Generic drug:  QUEtiapine Your last dose was: Your next dose is: Take 600 mg by mouth nightly. Indications: DELUSIONS  
 600 mg  
    
   
   
   
  
 ZyrTEC 10 mg Cap Generic drug:  Cetirizine Your last dose was: Your next dose is: Take  by mouth daily as needed. Indications: ALLERGIC RHINITIS  
     
   
   
   
  
  
STOP taking these medications   
 aspirin delayed-release 81 mg tablet  
   
  
 atorvastatin 20 mg tablet Commonly known as:  LIPITOR Where to Get Your Medications Information on where to get these meds will be given to you by the nurse or doctor. ! Ask your nurse or doctor about these medications  
  esomeprazole 40 mg capsule  
 levothyroxine 200 mcg tablet Discharge Instructions Disposition: home Activity: Activity as tolerated Diet: DIET GI SOFT No options chosen-low fat 
  
     
Follow-up Appointments Procedures  FOLLOW UP VISIT Appointment in: 3 - 5 Days [facility] for HIDA and gastroparesis followup thanks  
    [facility] for HIDA and gastroparesis followup thanks  
    Standing Status:   Standing  
    Number of Occurrences:   1  
    Order Specific Question:   Appointment in  
    Answer:   3 - 5 Days  
  
 
[facility]: Kelly Announcement We are excited to announce that we are making your provider's discharge notes available to you in Gungroo. You will see these notes when they are completed and signed by the physician that discharged you from your recent hospital stay. If you have any questions or concerns about any information you see in Gungroo, please call the Health Information Department where you were seen or reach out to your Primary Care Provider for more information about your plan of care. Introducing [facility]! Jered Diaz introduces Gungroo patient portal. Now you can access parts of your medical record, email your doctor's office, and request medication refills online. 1. In your internet browser, go to [email or web address] 2. Click on the First Time User? Click Here link in the Sign In box. You will see the New Member Sign Up page. 3. Enter your Gungroo Access Code exactly as it appears below. You will not need to use this code after youve completed the sign-up process. If you do not sign up before the expiration date, you must request a new code. · Gungroo Access Code: [email or web address] Expires: 6/28/2018 10:44 AM 
 
4. Enter the last four digits of your Social Security Number (xxxx) and Date of Birth (mm/dd/yyyy) as indicated and click Submit. You will be taken to the next sign-up page. 5. Create a Gungroo ID. This will be your Gungroo login ID and cannot be changed, so think of one that is secure and easy to remember. 6. Create a Gungroo password. You can change your password at any time. 7. Enter your Password Reset Question and Answer. This can be used at a later time if you forget your password. 8. Enter your e-mail address. You will receive e-mail notification when new information is available in [address] 9. Click Sign Up. You can now view and download portions of your medical record. 10. Click the Download Summary menu link to download a portable copy of your medical information. If you have questions, please visit the Frequently Asked Questions section of the [employer] website. Remember, [employer] is NOT to be used for urgent needs. For medical emergencies, dial 911. Now available from your iPhone and Android! Introducing Higinio Aguilar As a [facility] patient, I wanted to make you aware of our electronic visit tool called Higinio Aguilar. [employer] creations allows you to connect within minutes with a medical provider 24 hours a day, seven days a week via a mobile device or tablet or logging into a secure website from your computer. You can access Higinio Aguilar from anywhere in the [place]. A virtual visit might be right for you when you have a simple condition and feel like you just dont want to get out of bed, or cant get away from work for an appointment, when your regular [facility] [email or web address] [facility] provider is not available (evenings, weekends or holidays), or when youre out of town and need minor care. Electronic visits cost only $49 and if the [employer] provider determines a prescription is needed to treat your condition, one can be electronically transmitted to a nearby pharmacy*. Please take a moment to enroll today if you have not already done so. The enrollment process is free and takes just a few minutes. To enroll, please download the [employer] creations stephanie to your tablet or phone, or visit [email or web address] to enroll on your computer.    
And, as an [address] [place] [address] patient with a [employer] account, the results of your visits will be scanned into your electronic medical record and your primary care provider will be able to view the scanned results. We urge you to continue to see your regular [employer] provider for your ongoing medical care. And while your primary care provider may not be the one available when you seek a Higinio Bradyfin virtual visit, the peace of mind you get from getting a real diagnosis real time can be priceless. For more information on Enish, view our Frequently Asked Questions (FAQs) at [email or web address]. Sincerely, 
 
Shira Dahl MD 
Chief Medical Officer Zach Rodriguez *:  certain medications cannot be prescribed via Enish Providers Seen During Your Hospitalization Provider Specialty Primary office phone Brittany White MD Emergency Medicine [phone] Jaime Devi MD Internal Medicine [phone] Your Primary Care Physician (PCP) Primary Care Physician Office Phone Office Fax Mariana Yeung [phone] You are allergic to the following Allergen Reactions Geodon (Ziprasidone Hcl) Rash Recent Documentation Height Weight BMI OB Status Smoking Status 1.524 m 73 kg 31.44 kg/m2 Unknown Never Smoker Emergency Contacts Name Discharge Info Relation Home Work Mobile Bernardo Mota DISCHARGE CAREGIVER [3] Father [15] [phone] Kaitlynn Mota DISCHARGE CAREGIVER [3] Mother [14] [phone] Patient Belongings The following personal items are in your possession at time of discharge: 
  Dental Appliances: None  Visual Aid: None Discharge Instructions Attachments/References ESOPHAGITIS (ENGLISH) ANEMIA (ENGLISH) Patient Handouts Esophagitis: Care Instructions Your Care Instructions Esophagitis (say \"ih-sof-uh-JY-tus\") is irritation of the esophagus, the tube that carries food from your throat to your stomach. Acid reflux is the most common cause of this condition. When you have reflux, stomach acid and juices flow upward. This can cause pain or a burning feeling in your chest. You may have a sore throat. It may be hard to swallow. Other causes of this condition include some medicines and supplements. Allergies or an infection can also cause it. Your doctor will ask about your symptoms and past health. He or she might do tests to find the cause of your symptoms. Treatment depends on what is causing the problem. Treatment might include changing your diet or taking medicine to relieve your symptoms. It might also include changing a medicine that is causing your symptoms. If you have reflux, medicine that reduces the stomach acid helps your body heal. It might take 1 to 3 weeks to heal. 
Follow-up care is a key part of your treatment and safety. Be sure to make and go to all appointments, and call your doctor if you are having problems. It's also a good idea to know your test results and keep a list of the medicines you take. How can you care for yourself at home? · If you have acid reflux, your doctor may recommend that you: 
¨ Eat several small meals instead of two or three large meals. After you eat, wait 2 to 3 hours before you lie down. ¨ Avoid chocolate, mint, alcohol, and spicy foods. ¨ Don't smoke or use smokeless tobacco. Smoking can make this condition worse. If you need help quitting, talk to your doctor about stop-smoking programs and medicines. These can increase your chances of quitting for good. ¨ Raise the head of your bed 6 to 8 inches if you have symptoms at night. ¨ Lose weight if you are overweight. ¨ Take an over-the-counter antacid, such as Maalox, Mylanta, or Tums. Be careful when you take over-the-counter antacid medicines. Many of these medicines have aspirin in them.  Read the label to make sure that you are not taking more than the recommended dose. Too much aspirin can be harmful. ¨ Take stronger acid reducers. Examples are famotidine (such as Pepcid), omeprazole (such as Prilosec), and ranitidine (such as Zantac). · If your condition is caused by infection, allergy, or other problems, use the medicine or treatments that your doctor recommends. · Be safe with medicines. Take your medicines exactly as prescribed. Call your doctor if you think you are having a problem with your medicine. When should you call for help? Call your doctor now or seek immediate medical care if: 
? · You have new or worse belly pain. ? · You are vomiting. ? Watch closely for changes in your health, and be sure to contact your doctor if: 
? · You have new or worse symptoms of reflux. ? · You have trouble or pain swallowing. ? · You are losing weight. ? · You do not get better as expected. Where can you learn more? Go to http://jeni-hemalatha.info/. Enter L246 in the search box to learn more about \"Esophagitis: Care Instructions. \" Current as of: May 12, 2017 Content Version: 11.4 © 6445-1858 [employer]. Care instructions adapted under license by [employer] (which disclaims liability or warranty for this information). If you have questions about a medical condition or this instruction, always ask your healthcare professional. [place] [address] any warranty or liability for your use of this information. Anemia: Care Instructions Your Care Instructions Anemia is a low level of red blood cells, which carry oxygen throughout your body. Many things can cause anemia. Lack of iron is one of the most common causes. Your body needs iron to make hemoglobin, a substance in red blood cells that carries oxygen from the lungs to your body's cells. Without enough iron, the body produces fewer and smaller red blood cells. As a result, your body's cells do not get enough oxygen, and you feel tired and weak. And you may have trouble concentrating. Bleeding is the most common cause of a lack of iron. You may have heavy menstrual bleeding or bleeding caused by conditions such as ulcers, hemorrhoids, or cancer. Regular use of aspirin or other anti-inflammatory medicines (such as ibuprofen) also can cause bleeding in some people. A lack of iron in your diet also can cause anemia, especially at times when the body needs more iron, such as during pregnancy, infancy, and the teen years. Your doctor may have prescribed iron pills. It may take several months of treatment for your iron levels to return to normal. Your doctor also may suggest that you eat foods that are rich in iron, such as meat and beans. There are many other causes of anemia. It is not always due to a lack of iron. Finding the specific cause of your anemia will help your doctor find the right treatment for you. Follow-up care is a key part of your treatment and safety. Be sure to make and go to all appointments, and call your doctor if you are having problems. It's also a good idea to know your test results and keep a list of the medicines you take. How can you care for yourself at home? · Take your medicines exactly as prescribed. Call your doctor if you think you are having a problem with your medicine. · If your doctor recommends iron pills, take them as directed: ¨ Try to take the pills on an empty stomach about 1 hour before or 2 hours after meals. But you may need to take iron with food to avoid an upset stomach. ¨ Do not take antacids or drink milk or caffeine drinks (such as coffee, tea, or cola) at the same time or within 2 hours of the time that you take your iron. They can make it hard for your body to absorb the iron. ¨ Vitamin C (from food or supplements) helps your body absorb iron.  Try taking iron pills with a glass of orange juice or some other food that is high in vitamin C, such as citrus fruits. ¨ Iron pills may cause stomach problems, such as heartburn, nausea, diarrhea, constipation, and cramps. Be sure to drink plenty of fluids, and include fruits, vegetables, and fiber in your diet each day. Iron pills often make your bowel movements dark or green. ¨ If you forget to take an iron pill, do not take a double dose of iron the next time you take a pill. ¨ Keep iron pills out of the reach of small children. An overdose of iron can be very dangerous. · Follow your doctor's advice about eating iron-rich foods. These include red meat, shellfish, poultry, eggs, beans, raisins, whole-grain bread, and leafy green vegetables. · Steam vegetables to help them keep their iron content. When should you call for help? Call 911 anytime you think you may need emergency care. For example, call if: 
? · You have symptoms of a heart attack. These may include: ¨ Chest pain or pressure, or a strange feeling in the chest. 
¨ Sweating. ¨ Shortness of breath. ¨ Nausea or vomiting. ¨ Pain, pressure, or a strange feeling in the back, neck, jaw, or upper belly or in one or both shoulders or arms. ¨ Lightheadedness or sudden weakness. ¨ A fast or irregular heartbeat. After you call 911, the  may tell you to chew 1 adult-strength or 2 to 4 low-dose aspirin. Wait for an ambulance. Do not try to drive yourself. ? · You passed out (lost consciousness). ?Call your doctor now or seek immediate medical care if: 
? · You have new or increased shortness of breath. ? · You are dizzy or lightheaded, or you feel like you may faint. ? · Your fatigue and weakness continue or get worse. ? · You have any abnormal bleeding, such as: 
¨ Nosebleeds. ¨ Vaginal bleeding that is different (heavier, more frequent, at a different time of the month) than what you are used to. ¨ Bloody or black stools, or rectal bleeding. ¨ Bloody or pink urine. ? Watch closely for changes in your health, and be sure to contact your doctor if: 
? · You do not get better as expected. Where can you learn more? Go to http://jeni-hemalatha.info/. Enter R301 in the search box to learn more about \"Anemia: Care Instructions. \" Current as of: October 13, 2016 Content Version: 11.4 © 20060725-1546 [employer]. Care instructions adapted under license by [employer] (which disclaims liability or warranty for this information). If you have questions about a medical condition or this instruction, always ask your healthcare professional. [address] any warranty or liability for your use of this information. Please provide this summary of care documentation to your next provider. Signatures-by signing, you are acknowledging that this After Visit Summary has been reviewed with you and you have received a copy. Patient Signature:  ____________________________________________________________ Date:  ____________________________________________________________  
  
[facility] Provider Signature:  ____________________________________________________________ Date:  ____________________________________________________________

## 2018-05-31 NOTE — ED PROVIDER NOTES
HPI Comments: 44year old lady presents with concerns about  Fatigue, lightheadedness, and not feeling well. Patient presents from her primary care doctor's office after being seen there today. Primary care doctor had been following the patient for these symptoms and had been doing some routine blood work. She noticed that the patient's hemoglobin level has dropped approximately 2 g in the past month. She also saw some elevated LFTs and did an ultrasound and a gastric emptying test.  Patient then presented to the ER today because she continued to not feel well. Of note, patient is special needs. Elements of this note were created using speech recognition software. As such, errors of speech recognition may be present. Patient is a 44 y.o. female presenting with rapid heart beat. The history is provided by the patient. Rapid Heart Rate   Associated symptoms include abdominal pain. Pertinent negatives include no chest pain, no headaches and no shortness of breath.         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 Way [facility] Psychiatry     [facility]anoid type delus[facility] disorder ([facility]     from chemo as child- had brain tumor 1984    Stroke ([facility] 10-12-14    TIA; taking aspirin daily; pt released from neurologist care per mother    Tachycardia 11/8/2016    TIA (transient ischemic attack) 10/13/2014    Vasovagal syncope 3/2/2017     (ventriculoperitoneal) shunt status     not functioning based on 2016 shunt xray series       Past Surgical History:   Procedure Laterality Date    HX CRANIOTOMY  1984    HX CRANIOTOMY  2007    benign brain tumor    HX CSF SHUNT  1984    HX HEENT      tubes in ears    HX MYRINGOTOMY Bilateral     HX OTHER SURGICAL  1984    port placement for chemo    HX OTHER SURGICAL Left      SPHENOID BALLOON SINUPLASTY     HX OTHER SURGICAL      MENINGIOMA REMOVE     HX OTHER SURGICAL       SHUNT, CHEMO CATH         Family History:   Problem Relation Age of Onset    Diabetes Father     Hypertension Father    [facility] Gout Father     Arthritis-osteo Father     Osteoporosis Mother     Arthritis-osteo Mother     Allergic Rhinitis Mother        Social History     Social History    Marital status: SINGLE     Spouse name: N/A    Number of children: N/A    Years of education: N/A     Occupational History    Not on file. Social History Main Topics    Smoking status: Never Smoker    Smokeless tobacco: Never Used    Alcohol use No    Drug use: No    Sexual activity: Not Currently     Other Topics Concern    Seat Belt Yes     Social History Narrative    Lives at home with Mom and Dad         ALLERGIES: Geodon [ziprasidone hcl]    Review of Systems   Reason unable to perform ROS: ROS was obtained primarily from her parents. Constitutional: Positive for appetite change and fatigue. Negative for chills, diaphoresis and fever. HENT: Negative for congestion, rhinorrhea and sore throat. Eyes: Negative for redness and visual disturbance. Respiratory: Negative for cough, chest tightness, shortness of breath and wheezing. Cardiovascular: Negative for chest pain and palpitations. Gastrointestinal: Positive for abdominal pain and nausea.  Negative for blood in stool, diarrhea and vomiting. Endocrine: Negative for polydipsia and polyuria. Genitourinary: Negative for dysuria and hematuria. Musculoskeletal: Negative for arthralgias, myalgias and neck stiffness. Skin: Positive for color change. Negative for rash. Allergic/Immunologic: Negative for environmental allergies and food allergies. Neurological: Negative for dizziness, weakness and headaches. Hematological: Negative for adenopathy. Does not bruise/bleed easily. Psychiatric/Behavioral: Negative for confusion and sleep disturbance. The patient is not nervous/anxious. Vitals:    05/31/18 1525 05/31/18 1616 05/31/18 1629 05/31/18 1631   BP: 156/76 156/76 162/75    Pulse: (!) 132 (!) 124 (!) 105 (!) 105   Resp: 18  30 28   Temp: 98 °F (36.7 °C)      SpO2: 95%      Weight: 73 kg (161 lb)      Height: 5' (1.524 m)               Physical Exam   Constitutional: She is oriented to person, place, and time. She appears well-developed and well-nourished. HENT:   Head: Normocephalic and atraumatic. Neck: Normal range of motion. Cardiovascular: Normal rate and regular rhythm. Pulmonary/Chest: Effort normal and breath sounds normal. No respiratory distress. She has no wheezes. She has no rales. She exhibits no tenderness. Abdominal: Soft. Bowel sounds are normal. There is no tenderness. There is no rebound and no guarding. Musculoskeletal: Normal range of motion. She exhibits no edema or tenderness. Lymphadenopathy:     She has no cervical adenopathy. Neurological: She is alert and oriented to person, place, and time. Skin: Skin is warm and dry. Psychiatric: She has a normal mood and affect. Nursing note and vitals reviewed. MDM  Number of Diagnoses or Management Options  Diagnosis management comments: Patient's total bilirubin is elevated. She apparently has tachycardia at baseline but today she was in the 130s.   I gave her 5 of Lopressor and she responded well to that.    Her ultrasound is unremarkable and did not show any biliary problems. 5:51 PM  I discussed the case with the hospitalist who kindly agreed to see the patient.         ED Course       Procedures

## 2018-05-31 NOTE — ED TRIAGE NOTES
Per the mother the pt has a high heart rate and her hemoglobin is dropping. Pt has a cbc done today at the doctors office.

## 2018-05-31 NOTE — H&P
Hospitalist H&P Note     Admit Date:  2018  3:51 PM   Name:  Hemant Finnegan   Age:  44 y.o.  :  1980   MRN:  [id number]   PCP:  Alesia Gómez MD  Treatment Team: Attending Provider: Eli Shirley MD; Primary Nurse: Lexx Carmen RN    HPI:     CC: abnormal labs and GERD     Ms. Layne Guevara is a 45 yo female with PMH of CNS germinoma  age 2 with  shunt, OCD, paranoid schizophrenia, CVA  Evaluated with increased GERD and abnormal labs from PCP office. She had been followed by PCP for new onset RUQ pain, dizziness. She had RUQ US today showing  Fatty liver and gastric emptying study showing moderate delay. Labs show elevated total bilirubin and LFTs. Hepatitis panel is negative. HGB is near her baseline 9-10 range (9.9). Workup shows chronic disease. History is difficult to obtain from patient due to her cognition and father at bedside has little other information.       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 [facility] Psychiatry     Paranoid type delusional disorder ([facility]     from chemo as child- had brain tumor     Stroke ([facility] 10-12-    TIA; taking aspirin daily; pt released from neurologist care per mother    Tachycardia 11/8/2016    TIA (transient ischemic attack) 10/13/2014    Vasovagal syncope 3/2/2017     (ventriculoperitoneal) shunt status     not functioning based on 2016 shunt xray series      Past Surgical History:   Procedure Laterality Date    HX CRANIOTOMY  1984    HX CRANIOTOMY  2007    benign brain tumor    HX CSF SHUNT  1984    HX HEENT      tubes in ears    HX MYRINGOTOMY Bilateral     HX OTHER SURGICAL  1984    port placement for chemo    HX OTHER SURGICAL Left      SPHENOID BALLOON SINUPLASTY     HX OTHER SURGICAL      MENINGIOMA REMOVE     HX OTHER SURGICAL       SHUNT, CHEMO CATH      Allergies   Allergen Reactions    Geodon [Ziprasidone Hcl] Rash      Social History   Substance Use Topics    Smoking status: Never Smoker    Smokeless tobacco: Never Used    Alcohol use No      Family History   Problem Relation Age of Onset    Diabetes Father     Hypertension Father     Gout Father     Arthritis-osteo Father     Osteoporosis Mother     Arthritis-osteo Mother     Allergic Rhinitis Mother         There is no immunization history on file for this patient. PTA Medications:  Prior to Admission Medications   Prescriptions Last Dose Informant Patient Reported? Taking? ALPRAZolam (XANAX) 1 mg tablet   Yes No   Sig: Take 1 mg by mouth three (3) times daily as needed for Anxiety. Calcium-Cholecalciferol, D3, (CALCIUM 600 WITH VITAMIN D3) 600 mg(1,500mg) -400 unit cap   Yes No   Sig: Take  by mouth. Cetirizine (ZYRTEC) 10 mg cap   Yes No   Sig: Take  by mouth daily as needed. Indications: ALLERGIC RHINITIS   Desvenlafaxine SR (PRISTIQ) 50 mg tablet   Yes No   Sig: Take 50 mg by mouth. MULTIVITAMIN PO   Yes No   Sig: Take  by mouth. QUEtiapine (SEROQUEL) 300 mg tablet   Yes No   Sig: Take 150 mg by mouth nightly.  Indications: DELUSIONS   acetaminophen (TYLENOL) 500 mg tablet   Yes No   Sig: Take 1 Tab by mouth every six (6) hours as needed for Pain. aspirin delayed-release 81 mg tablet   Yes No   Sig: Take 81 mg by mouth nightly. atorvastatin (LIPITOR) 20 mg tablet   No No   Sig: Take 1 Tab by mouth daily. cholecalciferol, VITAMIN D3, (VITAMIN D3) 5,000 unit tab tablet   Yes No   Sig: Take  by mouth daily. esomeprazole (NEXIUM) 40 mg capsule   No No   Sig: Take 1 cap by mouth twice daily  Indications: gastroesophageal reflux disease   Patient taking differently: 40 mg daily. Take 1 cap by mouth twice daily  Indications: gastroesophageal reflux disease   fludrocortisone (FLORINEF) 0.1 mg tablet   No No   Sig: Take 1 Tab by mouth daily. levothyroxine (SYNTHROID) 112 mcg tablet   Yes No   Sig: Take  by mouth Daily (before breakfast). metFORMIN (GLUCOPHAGE) 500 mg tablet   Yes No   Sig: Take  by mouth two (2) times daily (with meals). ofloxacin (FLOXIN) 0.3 % otic solution   No No   Sig: Administer 5 Drops in left ear two (2) times a day. polyethylene glycol (MIRALAX) 17 gram/dose powder   Yes No   Sig: Take 17 g by mouth daily. risperiDONE (RISPERDAL) 2 mg tablet   Yes No   Si mg four (4) times daily. Facility-Administered Medications: None       Objective:   Patient Vitals for the past 24 hrs:   Temp Pulse Resp BP SpO2   18 1631 - (!) 105 28 - -   18 1629 - (!) 105 30 162/75 -   18 1616 - (!) 124 - 156/76 -   18 1525 98 °F (36.7 °C) (!) 132 18 156/76 95 %     Oxygen Therapy  O2 Sat (%): 95 % (18 1525)  Pulse via Oximetry: 132 beats per minute (18 1525)  O2 Device: Room air (18 1525)  No intake or output data in the 24 hours ending 18 1803    Physical Exam:  General:    Alert. No distress, pleasant   Eyes:   Right pupil reactive, left eye blind  ENT:  Normocephalic, atraumatic. Moist mucous membranes  CV:   RRR. No m/r/g. trace edema  Lungs:  CTAB. No wheezing, rhonchi, or rales. Abdomen: Soft, nontender,slightly distended.  Present BS  Extremities: Warm and dry. .  Neurologic:  grossly intact. Skin:     No rashes or jaundice. Normal coloration  Psych:  Normal mood and affect. I reviewed the labs, imaging, EKGs, telemetry, and other studies done this admission. Data Review:   Recent Results (from the past 24 hour(s))   CBC WITH AUTOMATED DIFF    Collection Time: 05/31/18  2:06 PM   Result Value Ref Range    WBC 9.7 4.0 - 11.0 K/uL    RBC 3.77 (L) 3.80 - 5.40 M/uL    HGB 10.0 (L) 12.0 - 16.0 g/dL    HCT 30.3 (L) 35.0 - 48.0 %    MCV 80.3 80.0 - 100.0 fL    MCH 26.7 (L) 27.0 - 34.0 pg    MCHC 33.2 31.0 - 36.0 g/dL    PLATELET 215 323 - 264 K/uL    ABS. LYMPHOCYTES 1.6 (L) 2.0 - 7.8 K/uL    LYMPHOCYTES 16.8 (L) 20.5 - 51.1 %    ABS. GRANULOCYTES 7.6 2.0 - 7.8 K/uL    GRANULOCYTES 78.0 37.0 - 92.0 %    ABS. MONOCYTES 0.50 0.10 - 1.08 K/ul    MONOCYTES 5.2 3.0 - 10.0 %    RDW 16.1 %    MEAN PLATELET VOLUME 7.8 fL   CBC WITH AUTOMATED DIFF    Collection Time: 05/31/18  3:36 PM   Result Value Ref Range    WBC 9.9 4.3 - 11.1 K/uL    RBC 3.80 (L) 4.05 - 5.25 M/uL    HGB 9.9 (L) 11.7 - 15.4 g/dL    HCT 30.7 (L) 35.8 - 46.3 %    MCV 80.8 79.6 - 97.8 FL    MCH 26.1 26.1 - 32.9 PG    MCHC 32.2 31.4 - 35.0 g/dL    RDW 16.2 (H) 11.9 - 14.6 %    PLATELET 401 425 - 401 K/uL    MPV 10.1 (L) 10.8 - 14.1 FL    DF AUTOMATED      NEUTROPHILS 74 43 - 78 %    LYMPHOCYTES 20 13 - 44 %    MONOCYTES 5 4.0 - 12.0 %    EOSINOPHILS 0 (L) 0.5 - 7.8 %    BASOPHILS 0 0.0 - 2.0 %    IMMATURE GRANULOCYTES 1 0.0 - 5.0 %    ABS. NEUTROPHILS 7.3 1.7 - 8.2 K/UL    ABS. LYMPHOCYTES 2.0 0.5 - 4.6 K/UL    ABS. MONOCYTES 0.5 0.1 - 1.3 K/UL    ABS. EOSINOPHILS 0.0 0.0 - 0.8 K/UL    ABS. BASOPHILS 0.0 0.0 - 0.2 K/UL    ABS. IMM.  GRANS. 0.1 0.0 - 0.5 K/UL   METABOLIC PANEL, COMPREHENSIVE    Collection Time: 05/31/18  3:36 PM   Result Value Ref Range    Sodium 137 136 - 145 mmol/L    Potassium 3.5 3.5 - 5.1 mmol/L    Chloride 99 98 - 107 mmol/L    CO2 24 21 - 32 mmol/L    Anion gap 14 7 - 16 mmol/L    Glucose 167 (H) 65 - 100 mg/dL    BUN 27 (H) 6 - 23 MG/DL    Creatinine 0.92 0.6 - 1.0 MG/DL    GFR est AA >60 >60 ml/min/1.73m2    GFR est non-AA >60 >60 ml/min/1.73m2    Calcium 9.0 8.3 - 10.4 MG/DL    Bilirubin, total 3.8 (H) 0.2 - 1.1 MG/DL    ALT (SGPT) 307 (H) 12 - 65 U/L    AST (SGOT) 144 (H) 15 - 37 U/L    Alk. phosphatase 564 (H) 50 - 136 U/L    Protein, total 8.5 (H) 6.3 - 8.2 g/dL    Albumin 3.2 (L) 3.5 - 5.0 g/dL    Globulin 5.3 (H) 2.3 - 3.5 g/dL    A-G Ratio 0.6 (L) 1.2 - 3.5     TROPONIN I    Collection Time: 05/31/18  3:36 PM   Result Value Ref Range    Troponin-I, Qt. <0.02 (L) 0.02 - 0.05 NG/ML   MAGNESIUM    Collection Time: 05/31/18  3:36 PM   Result Value Ref Range    Magnesium 1.7 (L) 1.8 - 2.4 mg/dL   TSH 3RD GENERATION    Collection Time: 05/31/18  3:36 PM   Result Value Ref Range    TSH 0.005 (L) 0.358 - 3.740 uIU/mL   LIPASE    Collection Time: 05/31/18  3:36 PM   Result Value Ref Range    Lipase 51 (L) 73 - 393 U/L       All Micro Results     None          Other Studies:  Nm Gastric Empty Stdy    Result Date: 5/31/2018  NUCLEAR MEDICINE SOLID PHASE GASTRIC EMPTYING EXAM. HISTORY:  Abnormal liver enzymes and fatty liver. Radiopharmaceutical: 1 mCi Tc 99m sulfur colloid standard egg meal. FINDINGS: Following ingestion of radiopharmaceutical imaging was performed out to 4 hours following the [facility] ([employer]) standard procedure. Gastric retention is calculated at 72 % at 1 hour. Gastric retention is calculated at 35% at 4 hours. Abnormal rapid emptying is less than 30% retention at one hour. Abnormal delayed emptying is greater than 10% retention 4 hours. IMPRESSION: Moderately delayed gastric emptying. Grade 1: Mild delayed emptying is less than 20% retention at 4 hours. Grade 2: Moderately emptying is 21-35% retention at 4 hours. Grade 3: Severe delayed emptying is greater than 50% retention at 4 hours.     Xr Chest Pa Lat    Result Date: 5/31/2018  CHEST X-RAY, 2 views 5/31/2018 History: Rapid heart rate. Technique: PA and lateral views of the chest. Comparison: None. Findings: The cardiac silhouette is normal in respect to size. The lungs are expanded without evidence for pneumothorax. No consolidation, or evidence of pleural effusion is seen. Only basilar reticular densities are seen favored to represent atelectasis or scarring. The bony thorax demonstrates no acute changes. What appears to be a shunt catheter overlies the medial right neck base. The upper abdomen is unremarkable in appearance. IMPRESSION: 1. Basilar scarring versus atelectasis without significant acute changes otherwise evident by plain film imaging. [address] [facility]    Result Date: 5/31/2018  Right Upper Quadrant Ultrasound INDICATION:  Elevated total bilirubin and LFTs, right upper quadrant pain; history of GERD, brain tumor, hyperlipidemia,  shunt COMPARISON: 6/26/2012 and 4/26/2018 TECHNIQUE:  Sonographic gray scale and Doppler images were obtained of the right upper quadrant of the abdomen. Technologist reports best possible imaging as the patient had difficulty with breath holding. FINDINGS: There is mildly heterogeneous and increased echogenicity throughout the liver which can limit sensitivity for masses. There are no discrete lesions in the visualized portions of the liver. Liver size is 18.5 cm, within normal limits. Main portal vein is patent on Doppler imaging. There is no bile duct dilatation. The common bile duct diameter is 5 mm. The gallbladder is partially contracted, and the patient reportedly had lunch today. No gallstones are seen. There is borderline nonspecific 3 mm gallbladder wall thickening, likely due to contracted status. Sonographic Mccain's sign is not seen. There are no discrete lesions in the limited visualized portions of the pancreas, mostly obscured by overlying bowel. The right kidney measures 10.1 cm in length. Color Doppler demonstrates expected right renal flow.  There is no hydronephrosis. There is no evidence of a solid renal mass. There is no evidence of ascites. The aorta and IVC are patent on Doppler imaging. Aortic diameter is within normal limits. IMPRESSION: 1. Mildly heterogeneous echogenic liver parenchyma is nonspecific, most often steatosis. 2. No acute abnormality otherwise.        Assessment and Plan:     Hospital Problems as of 5/31/2018  Date Reviewed: 5/22/2018          Codes Class Noted - Resolved POA    Cognitive developmental delay (Chronic) ICD-10-CM: F81.9  ICD-9-CM: 315.9  5/31/2018 - Present Yes        Elevated liver function tests ICD-10-CM: R79.89  ICD-9-CM: 790.6  5/31/2018 - Present Yes        Anemia (Chronic) ICD-10-CM: D64.9  ICD-9-CM: 285.9  5/31/2018 - Present Yes        Elevated blood pressure reading ICD-10-CM: R03.0  ICD-9-CM: 796.2  5/31/2018 - Present Yes        Hypomagnesemia ICD-10-CM: E83.42  ICD-9-CM: 275.2  5/31/2018 - Present Yes              · GERD: IV protonix, may need EGD due to constant symptoms and anemia, GI consulted, clear liquids until midnight then NPO   · Anemia: HGB seems near her baseline without reports of luis manuel GI blood loss, will check B12/folate, recent workup consistent with chronic disease, check occult stool   · RUQ ABD pain and elevated LFTS: repeat labs, GI consult, check UA   · Hyperglycemia: hold metformin, add sliding scale insulin   · OCD/schizophrenia: continue home antidepressants   ·  hypomagnesemia: replace and recheck   · Hypothyroidism: decrease synthroid dose and recheck in 8 weeks   · Tachycardia: EKG pending       Discharge planning:  Expect her to discharge home with parents once stable  DVT ppx: SCD  Code status:  Full  Estimated LOS:  Greater than 2 midnights  Risk:  high  Care plan: mother carolyn [phone]  Signed:  Robert Yanez MD

## 2018-06-01 PROBLEM — K80.20 CHOLELITHIASIS: Status: ACTIVE | Noted: 2018-01-01

## 2018-06-01 PROBLEM — K31.84 GASTROPARESIS: Status: ACTIVE | Noted: 2018-01-01

## 2018-06-01 PROBLEM — K25.9 GASTRIC EROSION: Status: ACTIVE | Noted: 2018-01-01

## 2018-06-01 NOTE — PROGRESS NOTES
Initial visit to assess pt's spiritual needs. Ministry of presence & prayer to demonstrate caring & concern, convey emotional & spiritual support.      Leroy Andrade MDiv,[facility],PhD

## 2018-06-01 NOTE — ROUTINE PROCESS
TRANSFER - OUT REPORT:    Verbal report given to Dieudonne alves RN(name) on [address]  being transferred to [facility](unit) for routine post - op       Report consisted of patients Situation, Background, Assessment and   Recommendations(SBAR). Information from the following report(s) SBAR, Kardex, Procedure Summary, Intake/Output, MAR, Accordion, Recent Results and Med Rec Status was reviewed with the receiving nurse. Lines:   Peripheral IV Right Forearm (Active)   Site Assessment Clean, dry, & intact 6/1/2018 10:16 AM   Phlebitis Assessment 0 6/1/2018 10:16 AM   Infiltration Assessment 0 6/1/2018 10:16 AM   Dressing Status Clean, dry, & intact 6/1/2018 10:16 AM   Dressing Type Transparent;Tape 6/1/2018 10:16 AM   Hub Color/Line Status Pink; Infusing 6/1/2018 10:16 AM        Opportunity for questions and clarification was provided.       Patient transported with:   Drivy

## 2018-06-01 NOTE — ANESTHESIA PREPROCEDURE EVALUATION
Anesthetic History   No history of anesthetic complications            Review of Systems / Medical History  Patient summary reviewed and pertinent labs reviewed    Pulmonary  Within defined limits                 Neuro/Psych         TIA (10/14 on bASA since then) and psychiatric history     Cardiovascular  Within defined limits                Exercise tolerance: >4 METS     GI/Hepatic/Renal     GERD: well controlled           Endo/Other      Hypothyroidism: well controlled  Cancer (brain)     Other Findings   Comments: Paranoid delusional disorder  Depression  Brain tumor--left eye closed           Physical Exam    Airway  Mallampati: II  TM Distance: 4 - 6 cm  Neck ROM: normal range of motion   Mouth opening: Normal     Cardiovascular    Rhythm: regular           Dental  No notable dental hx       Pulmonary  Breath sounds clear to auscultation               Abdominal  GI exam deferred       Other Findings            Anesthetic Plan    ASA: 3  Anesthesia type: total IV anesthesia          Induction: Intravenous  Anesthetic plan and risks discussed with: Patient

## 2018-06-01 NOTE — PROGRESS NOTES
Spoke with Dr. Leno Lal regarding pt tachycardia in the 120s and 130s. MD reviewed EKG. Also discussed home medication dosages different from mar. Orders received.

## 2018-06-01 NOTE — ANESTHESIA POSTPROCEDURE EVALUATION
Post-Anesthesia Evaluation and Assessment    Patient: Jason Barnett MRN: [id number]  SSN: [id number]    YOB: 1980  Age: 44 y.o. Sex: female       Cardiovascular Function/Vital Signs  Visit Vitals    /72    Pulse (!) 109    Temp 37.4 °C (99.3 °F)    Resp 26    Ht 5' (1.524 m)    Wt 73 kg (161 lb)    SpO2 98%    BMI 31.44 kg/m2       Patient is status post total IV anesthesia anesthesia for Procedure(s):  ESOPHAGOGASTRODUODENOSCOPY (EGD). Nausea/Vomiting: None    Postoperative hydration reviewed and adequate. Pain:  Pain Scale 1: Numeric (0 - 10) (06/01/18 1048)  Pain Intensity 1: 0 (06/01/18 1016)   Managed    Neurological Status:   Neuro  Neurologic State: Alert (05/31/18 2029)  Orientation Level: Oriented X4 (05/31/18 2029)  Cognition: Decreased attention/concentration; Impaired decision making (05/31/18 2029)  Assessment L Pupil: Fixed (05/31/18 2029)  Size L Pupil (mm): 8 (05/31/18 2029)  Assessment R Pupil: Brisk (05/31/18 2029)  Size R Pupil (mm): 4 (05/31/18 2029)  LUE Motor Response: Purposeful (05/31/18 2029)  LLE Motor Response: Purposeful (05/31/18 2029)  RUE Motor Response: Purposeful (05/31/18 2029)  RLE Motor Response: Purposeful (05/31/18 2029)   At baseline    Mental Status and Level of Consciousness: Arousable    Pulmonary Status:   O2 Device: Room air (06/01/18 1048)   Adequate oxygenation and airway patent    Complications related to anesthesia: None    Post-anesthesia assessment completed.  No concerns    Signed By: Kristi Franks MD     June 1, 2018

## 2018-06-01 NOTE — PHYSICIAN ADVISORY
Letter of Determination:  Outpatient status receiving Observation Services    This patient was originally hospitalized as Inpatient Status on 5/31/2018 for hematemesis. At this time this patient does not appear to meet the medical necessity requirements in CMS regulation Section 43 .3 to support an inpatient level of care. It is our recommendation that this patient's hospitalization status should be changed from INPATIENT to [place] receiving OBSERVATION services via Condition Code 44.      This may change due to the medical condition of the patient and new clinical evidence as the patients care progreses. The final decision regarding the patient's hospitalization status depends on the attending physician's judgement.     Ja Anton MD, [facility],   Physician Stef Irby.

## 2018-06-01 NOTE — PROGRESS NOTES
Hospitalist Progress Note     Admit Date:  2018  3:51 PM   Name:  Lizeth Weber   Age:  44 y.o.  :  1980   MRN:  [id number]   PCP:  Karin Khan MD  Treatment Team: Attending Provider: Nikolai Lema MD; Utilization Review: Malathi Espitia RN    Subjective:       Ms. 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 She had been followed by PCP for new onset RUQ pain, dizziness. She had RUQ US  showing  Fatty liver and gastric emptying study showing moderate delay. Labs show elevated total bilirubin and LFTs. Hepatitis panel is negative. HGB is near her baseline 9-10 range (9.9). Anemia Workup shows chronic disease. History is difficult to obtain from patient due to her cognition and father at bedside has little other information. She was admitted for GI consult and s/p EGD showing mild esophagitis and gastric erosion. She is on IV protonix with decline in HGB to 8.0 without luis manuel GI loss. she plans to have outpatient GI followup to discuss gastroparesis and HIDA scan.  Plans are for home once stable     18 parents present, waiting on lunch, has some gas, mother reports coffee ground emesis overnight, , ROS difficult due to mentation         Objective:   Patient Vitals for the past 24 hrs:   Temp Pulse Resp BP SpO2   18 1213 98.6 °F (37 °C) (!) 108 24 118/80 96 %   18 1128 - (!) 107 28 147/67 98 %   18 1118 - (!) 106 23 157/70 98 %   18 1108 - (!) 107 23 152/73 98 %   18 1058 - (!) 109 26 154/72 98 %   18 1048 98.6 °F (37 °C) (!) 105 26 154/72 97 %   18 1016 - (!) 110 20 (!) 167/117 93 %   18 0954 99.3 °F (37.4 °C) - - - -   18 0820 99.5 °F (37.5 °C) (!) 109 20 113/75 94 %   18 0641 97.7 °F (36.5 °C) - - - -   18 0330 98.5 °F (36.9 °C) (!) 111 21 120/74 94 %   18 2301 98.9 °F (37.2 °C) (!) 133 24 152/79 94 %   05/31/18 1913 99.6 °F (37.6 °C) (!) 125 26 (!) 167/102 97 %     Oxygen Therapy  O2 Sat (%): 96 % (06/01/18 1213)  Pulse via Oximetry: 107 beats per minute (06/01/18 1128)  O2 Device: Room air (06/01/18 1452)    Intake/Output Summary (Last 24 hours) at 06/01/18 1700  Last data filed at 06/01/18 1641   Gross per 24 hour   Intake             1881 ml   Output             1925 ml   Net              -44 ml         General:    Well nourished. Alert. No distress    CV:   RRR. No murmur, rub, or gallop.trace edema  Lungs:   CTAB. No wheezing, rhonchi, or rales. Anterior exam   Abdomen:   Soft, nontender, nondistended. Decreased BS  Extremities: Warm and dry. No cyanosis  Skin:     No rashes or jaundice. Neuro:  No gross focal deficits    Data Review:  I have reviewed all labs, meds, telemetry events, and studies from the last 24 hours. Recent Results (from the past 24 hour(s))   GLUCOSE, POC    Collection Time: 05/31/18 11:25 PM   Result Value Ref Range    Glucose (POC) 133 (H) 65 - 672 mg/dL   METABOLIC PANEL, COMPREHENSIVE    Collection Time: 06/01/18  4:39 AM   Result Value Ref Range    Sodium 142 136 - 145 mmol/L    Potassium 3.5 3.5 - 5.1 mmol/L    Chloride 105 98 - 107 mmol/L    CO2 24 21 - 32 mmol/L    Anion gap 13 7 - 16 mmol/L    Glucose 105 (H) 65 - 100 mg/dL    BUN 20 6 - 23 MG/DL    Creatinine 0.59 (L) 0.6 - 1.0 MG/DL    GFR est AA >60 >60 ml/min/1.73m2    GFR est non-AA >60 >60 ml/min/1.73m2    Calcium 7.7 (L) 8.3 - 10.4 MG/DL    Bilirubin, total 3.1 (H) 0.2 - 1.1 MG/DL    ALT (SGPT) 233 (H) 12 - 65 U/L    AST (SGOT) 155 (H) 15 - 37 U/L    Alk.  phosphatase 490 (H) 50 - 136 U/L    Protein, total 6.6 6.3 - 8.2 g/dL    Albumin 2.4 (L) 3.5 - 5.0 g/dL    Globulin 4.2 (H) 2.3 - 3.5 g/dL    A-G Ratio 0.6 (L) 1.2 - 3.5     CBC WITH AUTOMATED DIFF    Collection Time: 06/01/18  4:39 AM   Result Value Ref Range    WBC 7.9 4.3 - 11.1 K/uL    RBC 3.11 (L) 4.05 - 5.25 M/uL    HGB 8.0 (L) 11.7 - 15.4 g/dL    HCT 25.2 (L) 35.8 - 46.3 %    MCV 81.0 79.6 - 97.8 FL    MCH 25.7 (L) 26.1 - 32.9 PG    MCHC 31.7 31.4 - 35.0 g/dL    RDW 16.5 (H) 11.9 - 14.6 %    PLATELET 137 052 - 781 K/uL    MPV 10.0 (L) 10.8 - 14.1 FL    DF AUTOMATED      NEUTROPHILS 56 43 - 78 %    LYMPHOCYTES 34 13 - 44 %    MONOCYTES 7 4.0 - 12.0 %    EOSINOPHILS 1 0.5 - 7.8 %    BASOPHILS 1 0.0 - 2.0 %    IMMATURE GRANULOCYTES 1 0.0 - 5.0 %    ABS. NEUTROPHILS 4.4 1.7 - 8.2 K/UL    ABS. LYMPHOCYTES 2.7 0.5 - 4.6 K/UL    ABS. MONOCYTES 0.6 0.1 - 1.3 K/UL    ABS. EOSINOPHILS 0.1 0.0 - 0.8 K/UL    ABS. BASOPHILS 0.1 0.0 - 0.2 K/UL    ABS. IMM.  GRANS. 0.1 0.0 - 0.5 K/UL   MAGNESIUM    Collection Time: 06/01/18  4:39 AM   Result Value Ref Range    Magnesium 2.5 (H) 1.8 - 2.4 mg/dL   GLUCOSE, POC    Collection Time: 06/01/18  5:56 AM   Result Value Ref Range    Glucose (POC) 108 (H) 65 - 100 mg/dL   GLUCOSE, POC    Collection Time: 06/01/18 12:26 PM   Result Value Ref Range    Glucose (POC) 107 (H) 65 - 100 mg/dL        All Micro Results     None          Current Meds:  Current Facility-Administered Medications   Medication Dose Route Frequency    [START ON 6/2/2018] levothyroxine (SYNTHROID) tablet 200 mcg  200 mcg Oral ACB    ALPRAZolam (XANAX) tablet 0.5 mg  0.5 mg Oral TID PRN    atorvastatin (LIPITOR) tablet 20 mg  20 mg Oral DAILY    desvenlafaxine ER tablet 50 mg (Patient Supplied)  50 mg Oral DAILY    fludrocortisone (FLORINEF) tablet 100 mcg  0.1 mg Oral DAILY    polyethylene glycol (MIRALAX) packet 17 g  17 g Oral DAILY    risperiDONE (RisperDAL) tablet 1 mg  1 mg Oral QID    sodium chloride (NS) flush 5-10 mL  5-10 mL IntraVENous Q8H    sodium chloride (NS) flush 5-10 mL  5-10 mL IntraVENous PRN    acetaminophen (TYLENOL) tablet 650 mg  650 mg Oral Q6H PRN    ondansetron (ZOFRAN) injection 4 mg  4 mg IntraVENous Q4H PRN    pantoprazole (PROTONIX) 40 mg in sodium chloride 0.9% 10 mL injection  40 mg IntraVENous Q12H    hydrALAZINE (APRESOLINE) 20 mg/mL injection 10 mg  10 mg IntraVENous Q6H PRN    0.9% sodium chloride infusion  100 mL/hr IntraVENous CONTINUOUS    insulin lispro (HUMALOG) injection   SubCUTAneous Q6H    QUEtiapine (SEROquel) tablet 600 mg  600 mg Oral QHS       Diet:  DIET FULL LIQUID    Other Studies (last 24 hours):  [address]    Result Date: 5/31/2018  Right Upper Quadrant Ultrasound INDICATION:  Elevated total bilirubin and LFTs, right upper quadrant pain; history of GERD, brain tumor, hyperlipidemia,  shunt COMPARISON: 6/26/2012 and 4/26/2018 TECHNIQUE:  Sonographic gray scale and Doppler images were obtained of the right upper quadrant of the abdomen. Technologist reports best possible imaging as the patient had difficulty with breath holding. FINDINGS: There is mildly heterogeneous and increased echogenicity throughout the liver which can limit sensitivity for masses. There are no discrete lesions in the visualized portions of the liver. Liver size is 18.5 cm, within normal limits. Main portal vein is patent on Doppler imaging. There is no bile duct dilatation. The common bile duct diameter is 5 mm. The gallbladder is partially contracted, and the patient reportedly had lunch today. No gallstones are seen. There is borderline nonspecific 3 mm gallbladder wall thickening, likely due to contracted status. Sonographic Mccain's sign is not seen. There are no discrete lesions in the limited visualized portions of the pancreas, mostly obscured by overlying bowel. The right kidney measures 10.1 cm in length. Color Doppler demonstrates expected right renal flow. There is no hydronephrosis. There is no evidence of a solid renal mass. There is no evidence of ascites. The aorta and IVC are patent on Doppler imaging. Aortic diameter is within normal limits. IMPRESSION: 1. Mildly heterogeneous echogenic liver parenchyma is nonspecific, most often steatosis. 2. No acute abnormality otherwise.        Assessment and Plan:     Hospital Problems as of 6/1/2018  Date Reviewed: 5/22/2018          Codes Class Noted - Resolved POA    Cholelithiasis ICD-10-CM: K80.20  ICD-9-CM: 574.20  6/1/2018 - Present Unknown        Cognitive developmental delay (Chronic) ICD-10-CM: F81.9  ICD-9-CM: 315.9  5/31/2018 - Present Yes        Elevated liver function tests ICD-10-CM: R79.89  ICD-9-CM: 790.6  5/31/2018 - Present Yes        Anemia (Chronic) ICD-10-CM: D64.9  ICD-9-CM: 285.9  5/31/2018 - Present Yes        Elevated blood pressure reading ICD-10-CM: R03.0  ICD-9-CM: 796.2  5/31/2018 - Present Yes        Hypomagnesemia ICD-10-CM: E83.42  ICD-9-CM: 275.2  5/31/2018 - Present Yes        Hyperglycemia (Chronic) ICD-10-CM: R73.9  ICD-9-CM: 790.29  5/31/2018 - Present Yes        OCD (obsessive compulsive disorder) ICD-10-CM: F42.9  ICD-9-CM: 300.3  Unknown - Present Yes        Hypothyroidism (Chronic) ICD-10-CM: E03.9  ICD-9-CM: 244.9  10/13/2014 - Present Yes        Depression (Chronic) ICD-10-CM: F32.9  ICD-9-CM: 719  10/13/2014 - Present Yes        GERD (gastroesophageal reflux disease) (Chronic) ICD-10-CM: K21.9  ICD-9-CM: 530.81  10/13/2014 - Present Yes              PLAN:         · GERD/gastric erosion and esophagitis : new issue 6-1 protonix BID,avoid NSAIDS/ stop ASA  · Anemia: trend HGB  · RUQ ABD pain and elevated LFTS: outpatient HIDA  · Gastroparesis: outpatient followup, advised smaller meals  · Hyperglycemia: hold metformin, continue sliding scale insulin   · OCD/schizophrenia: continue home antidepressants   ·  hypomagnesemia: replaced  · Hypothyroidism: decreased synthroid dose and recheck in 8 weeks   · Tachycardia: noted as sinus tach        Discharge planning:  Expect her to discharge home with parents once stable 6-2   DVT ppx: SCD  Code status:  Full  Estimated LOS:  Greater than 2 midnights  Risk:  high  Care plan: mother carolyn [phone]  Signed:  Dot Davalos MD        Signed:  Dot Davalos MD

## 2018-06-01 NOTE — CONSULTS
[facility] Consult Note           Referring Physician:     Consult Date: 6/1/2018    Reason for Consult: Coffee ground emesis and anemia    History of Present Illness:  Patient is a 44 y.o. female with PMH brain surgery and many psychiatric disorders who is seen in consultation for coffee ground emesis and hgb 8. Last EGD was 1 year ago. Gastric emptying study showed delay. She sees Dr. Dulce Hernandez. Her LFT's are elevated (bili 3, transaminases in the 150 to 230 range). US just shows mild fatty liver. Some RUQ pain. Gallbladder is still in. Has HIDA scan scheduled next week outpt.       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 Peters Schirmer Psychiatry     Paranoid type delusional disorder ([facility] [address]     from chemo as child- had brain tumor 1984    Stroke ([facility] [address] 10-12-14    TIA; taking aspirin daily; pt released from neurologist care per mother    Tachycardia 11/8/2016    TIA (transient ischemic attack) 10/13/2014    Vasovagal syncope 3/2/2017     (ventriculoperitoneal) shunt status     not functioning based on 2016 shunt xray series      Past Surgical History:   Procedure Laterality Date    HX CRANIOTOMY  1984    HX CRANIOTOMY  2007    benign brain tumor  HX CSF SHUNT  1984    HX HEENT      tubes in ears    HX MYRINGOTOMY Bilateral     HX OTHER SURGICAL  1984    port placement for chemo    HX OTHER SURGICAL Left      SPHENOID BALLOON SINUPLASTY     HX OTHER SURGICAL      MENINGIOMA REMOVE     HX OTHER SURGICAL       SHUNT, CHEMO CATH      Family History   Problem Relation Age of Onset    Diabetes Father     Hypertension Father    [facility] G[facility] Father     Arthritis-osteo Father     Osteoporosis Mother     Arthritis-osteo Mother     Allergic Rhinitis Mother      Social History     Occupational History    Not on file.      Social History Main Topics    Smoking status: Never Smoker    Smokeless tobacco: Never Used    Alcohol use No    Drug use: No    Sexual activity: Not Currently       Hospital Medications:  Current Facility-Administered Medications   Medication Dose Route Frequency    levothyroxine (SYNTHROID) tablet 224 mcg  224 mcg Oral ACB    ALPRAZolam (XANAX) tablet 0.5 mg  0.5 mg Oral TID PRN    aspirin delayed-release tablet 81 mg  81 mg Oral QHS    atorvastatin (LIPITOR) tablet 20 mg  20 mg Oral DAILY    desvenlafaxine ER tablet 50 mg (Patient Supplied)  50 mg Oral DAILY    fludrocortisone (FLORINEF) tablet 100 mcg  0.1 mg Oral DAILY    polyethylene glycol (MIRALAX) packet 17 g  17 g Oral DAILY    risperiDONE (RisperDAL) tablet 1 mg  1 mg Oral QID    sodium chloride (NS) flush 5-10 mL  5-10 mL IntraVENous Q8H    sodium chloride (NS) flush 5-10 mL  5-10 mL IntraVENous PRN    acetaminophen (TYLENOL) tablet 650 mg  650 mg Oral Q6H PRN    ondansetron (ZOFRAN) injection 4 mg  4 mg IntraVENous Q4H PRN    pantoprazole (PROTONIX) 40 mg in sodium chloride 0.9% 10 mL injection  40 mg IntraVENous Q12H    hydrALAZINE (APRESOLINE) 20 mg/mL injection 10 mg  10 mg IntraVENous Q6H PRN    0.9% sodium chloride infusion  100 mL/hr IntraVENous CONTINUOUS    insulin lispro (HUMALOG) injection   SubCUTAneous Q6H    QUEtiapine (SEROquel) tablet 600 mg 600 mg Oral QHS       Allergies: Allergies   Allergen Reactions    Geodon [Ziprasidone Hcl] Rash       Review of Systems:  A comprehensive review of systems was negative except for that written in the History of Present Illness. Objective:     Physical Exam:  Vitals:  Visit Vitals    /75    Pulse (!) 109    Temp 99.5 °F (37.5 °C)    Resp 20    Ht 5' (1.524 m)    Wt 73 kg (161 lb)    SpO2 94%    BMI 31.44 kg/m2       General: No acute distress. Skin:  Extremities and face reveal no rashes. No garcia erythema. No telangiectasias on the chest wall. HEENT: Sclerae anicteric. No oral ulcers. No abnormal pigmentation of the lips. The neck is supple. Cardiovascular: Regular rate and rhythm. No murmurs, gallops, or rubs. Respiratory:  Comfortable breathing  With no accessory muscle use. Clear breath sounds with no wheezes, rales, or rhonchi. GI:  Abdomen nondistended, soft, and nontender. Normal active bowel sounds. No enlargement of the liver or spleen. No masses palpable. Musculoskeletal:  No pitting edema of the lower legs. Extremities have good range of motion. Neurological:  Gross memory appears intact. Patient is alert and oriented. Psychiatric:  Mood appears appropriate with judgement intact. Lymphatic:  No cervical or supraclavicular adenopathy. Laboratory:    Recent Labs      06/01/18   0439   WBC  7.9   RBC  3.11*   HGB  8.0*   HCT  25.2*   PLT  310      Recent Labs      06/01/18   0439   GLU  105*   NA  142   K  3.5   CL  105   CO2  24   BUN  20   CREA  0.59*   CA  7.7*     No results for input(s): PTP, INR, APTT in the last 72 hours.     No lab exists for component: INREXT  Recent Labs      06/01/18   0439  05/31/18   1536  05/31/18   1406   CBIL   --    --   3.43*   SGOT  155*  144*  139*   AP  490*  564*  555*   ALB  2.4*  3.2*  3.9   TP  6.6  8.5*  7.5   LPSE   --   51*   --        Assessment:       A 44 y.o. female with significant psychiatric history here with coffee ground emesis, anemia (hgb 8), gastroparesis, and elevated LFT's.       Plan:       EGD today to evaluate for bleeding  I will leave the decision about how to treat the gastroparesis to Dr. Talya Forbes as an outpt  Agree with HIDA scan next week      Signed By: Esvin Beth MD     June 1, 2018

## 2018-06-01 NOTE — PROCEDURES
Endoscopic Gastroduodenoscopy Procedure Note    Indications: Coffee ground emesis, anemia    Anesthesia/Sedation: MAC IV     Pre-Procedure Physical:    Current Facility-Administered Medications   Medication Dose Route Frequency    levothyroxine (SYNTHROID) tablet 224 mcg  224 mcg Oral ACB    lactated Ringers infusion 1,000 mL  1,000 mL IntraVENous CONTINUOUS    ALPRAZolam (XANAX) tablet 0.5 mg  0.5 mg Oral TID PRN    aspirin delayed-release tablet 81 mg  81 mg Oral QHS    atorvastatin (LIPITOR) tablet 20 mg  20 mg Oral DAILY    desvenlafaxine ER tablet 50 mg (Patient Supplied)  50 mg Oral DAILY    fludrocortisone (FLORINEF) tablet 100 mcg  0.1 mg Oral DAILY    polyethylene glycol (MIRALAX) packet 17 g  17 g Oral DAILY    risperiDONE (RisperDAL) tablet 1 mg  1 mg Oral QID    sodium chloride (NS) flush 5-10 mL  5-10 mL IntraVENous Q8H    sodium chloride (NS) flush 5-10 mL  5-10 mL IntraVENous PRN    acetaminophen (TYLENOL) tablet 650 mg  650 mg Oral Q6H PRN    ondansetron (ZOFRAN) injection 4 mg  4 mg IntraVENous Q4H PRN    pantoprazole (PROTONIX) 40 mg in sodium chloride 0.9% 10 mL injection  40 mg IntraVENous Q12H    hydrALAZINE (APRESOLINE) 20 mg/mL injection 10 mg  10 mg IntraVENous Q6H PRN    0.9% sodium chloride infusion  100 mL/hr IntraVENous CONTINUOUS    insulin lispro (HUMALOG) injection   SubCUTAneous Q6H    QUEtiapine (SEROquel) tablet 600 mg  600 mg Oral QHS      Geodon [ziprasidone hcl]    Patient Vitals for the past 8 hrs:   BP Temp Pulse Resp SpO2   06/01/18 1016 (!) 167/117 - (!) 110 20 93 %   06/01/18 0954 - 99.3 °F (37.4 °C) - - -   06/01/18 0820 113/75 99.5 °F (37.5 °C) (!) 109 20 94 %   06/01/18 0641 - 97.7 °F (36.5 °C) - - -   06/01/18 0330 120/74 98.5 °F (36.9 °C) (!) 111 21 94 %       Exam      Airway: clear   Heart: normal S1and S2    Lungs: clear bilateral  Abdomen: soft, nontender, bowel sounds present and normal in all quads   Mental Status: awake, alert and oriented to person, place and time          Procedure Details     Informed consent was obtained for the procedure, including conscious sedation. Risks of pancreatitis, infection, perforation, hemorrhage, adverse drug reaction and aspiration were discussed. The patient was placed in the left lateral decubitus position. Based on the pre-procedure assessment, including review of the patient's medical history, medications, allergies, and review of systems, she had been deemed to be an appropriate candidate for conscious sedation; she was therefore sedated with the medications listed below. She was monitored continuously with ECG tracing, pulse oximetry, blood pressure monitoring, and direct observation. The EGD gastroscope was inserted into the mouth and advanced under direct vision to the second portion of the duodenum. A careful inspection was made as the gastroscope was withdrawn, including a retroflexed view of the proximal stomach; findings and interventions are described below. Appropriate photodocumentation was obtained. Findings:   Esophagus- Mild distal esophagitis. Stomach- Gastric erosion in proximal stomach. Duodenum- Normal.    Therapies: None    Specimens: None    Estimated Blood Loss: 0 cc           Complications:   None; patient tolerated the procedure well. Attending Attestation:  I performed the procedure.      Impression:    Gastric erosion, esophagitis  No food in stomach    Recommendations:  PPI  Follow hgb  Gastroparesis treatment per Dr. Sandhya DONIS scan next week

## 2018-06-01 NOTE — CONSULTS
.  [facility] Consult Note             Date: 6/1/2018    GI Problem: anemia and GERD    History of Present Illness:  Patient is a 44 y.o. female with PMHx of cognitive developmental delay, GERD, hyperlipidemia, TIA, and  shunt who is seen in consultation for anemia and GERD. Due to patient's cognitive developmental delay her mother who was present at bedside provided most of the background concerning the patient's current condition. [facility] has been [facility] in [facility] for GERD and peptic ulcers. She has been having RUQ pain Gastric emptying study was ordered yesterday which revealed moderate delayed gastric emptying. RUQ US revealed steatosis with borderline nonspecific 3mm gallbladder thickening which may have been a result of the patient's contracted status. No bowel or bladder issues reported. Patient has had a reduced appetite lately. Last night the patient had an episode of hematemesis that the mother describes as looking like coffee grounds. Her Hb has been dropping over the past 24 hrs from 10 to 8.     Hospital Medications:  Current Facility-Administered Medications   Medication Dose Route Frequency    levothyroxine (SYNTHROID) tablet 224 mcg  224 mcg Oral ACB    ALPRAZolam (XANAX) tablet 0.5 mg  0.5 mg Oral TID PRN    aspirin delayed-release tablet 81 mg  81 mg Oral QHS    atorvastatin (LIPITOR) tablet 20 mg  20 mg Oral DAILY    desvenlafaxine ER tablet 50 mg (Patient Supplied)  50 mg Oral DAILY    fludrocortisone (FLORINEF) tablet 100 mcg  0.1 mg Oral DAILY    polyethylene glycol (MIRALAX) packet 17 g  17 g Oral DAILY    risperiDONE (RisperDAL) tablet 1 mg  1 mg Oral QID    sodium chloride (NS) flush 5-10 mL  5-10 mL IntraVENous Q8H    sodium chloride (NS) flush 5-10 mL  5-10 mL IntraVENous PRN    acetaminophen (TYLENOL) tablet 650 mg  650 mg Oral Q6H PRN    ondansetron (ZOFRAN) injection 4 mg  4 mg IntraVENous Q4H PRN    pantoprazole (PROTONIX) 40 mg in sodium chloride 0.9% 10 mL injection  40 mg IntraVENous Q12H    hydrALAZINE (APRESOLINE) 20 mg/mL injection 10 mg  10 mg IntraVENous Q6H PRN    0.9% sodium chloride infusion  100 mL/hr IntraVENous CONTINUOUS    insulin lispro (HUMALOG) injection   SubCUTAneous Q6H    QUEtiapine (SEROquel) tablet 600 mg  600 mg Oral QHS       Objective:     Physical Exam:  Vitals:  Visit Vitals    /74    Pulse (!) 111    Temp 97.7 °F (36.5 °C)    Resp 21    Ht 5' (1.524 m)    Wt 73 kg (161 lb)    SpO2 94%    BMI 31.44 kg/m2       General: No acute distress. Skin:  Extremities and face reveal no rashes. No garcia erythema. No telangiectasias on the chest wall. HEENT: Sclerae anicteric. No oral ulcers. No abnormal pigmentation of the lips. The neck is supple. Cardiovascular: Regular rate and rhythm. No murmurs, gallops, or rubs. Respiratory:  Comfortable breathing  With no accessory muscle use. Clear breath sounds with no wheezes, rales, or rhonchi. GI:  Abdomen nondistended, soft, and +ttp in RUQ and LLQ. Normal active bowel sounds. No enlargement of the liver or spleen. No masses palpable. Musculoskeletal:  No pitting edema of the lower legs. Extremities have good range of motion. Neurological:  Gross memory appears intact. Patient is alert and oriented. Psychiatric:  Mood appears appropriate with judgement intact. Lymphatic:  No cervical or supraclavicular adenopathy. Laboratory:    Recent Labs      06/01/18 0439   WBC  7.9   RBC  3.11*   HGB  8.0*   HCT  25.2*   PLT  310      Recent Labs      06/01/18   0439   GLU  105*   NA  142   K  3.5   CL  105   CO2  24   BUN  20   CREA  0.59*   CA  7.7*     No results for input(s): PTP, INR, APTT in the last 72 hours.     No lab exists for component: INREXT  Recent Labs      06/01/18   0439  05/31/18   1536  05/31/18   1406   CBIL   --    --   3.43*   SGOT  155*  144*  139*   AP  490*  564*  555*   ALB  2.4*  3.2*  3.9   TP  6.6  8.5* 7. 5   LPSE   --   46*   --        Assessment:       A 44 y.o. female with PMHx of cognitive developmental delay, GERD, hyperlipidemia, TIA, and  shunt that has been evaluated for anemia and GERD. Patient's body habitus along with ALT:AST ratio and RUQ US support the diagnosis of fatty liver disease. She currently has normocytic, anemia with a declining Hb level and hematemesis last night. Recent gastric emptying study shows a moderate delay as well.     Plan:     -EGD today or tomorrow   -monitor H&H, LFTs      Signed By: Koby Horn     June 1, 2018

## 2018-06-01 NOTE — PROGRESS NOTES
Pt c/o not being able to cough something up. RN witnessed patient vomit medium amount coffee ground emesis. PT states she felt relief after. Md made aware. No new orders received.

## 2018-06-02 NOTE — PROGRESS NOTES
Hospitalist Progress Note     Admit Date:  2018  3:51 PM   Name:  Randy [employer]   Age:  44 y.o.  :  1980   MRN:  [id number]   PCP:  Crystal Fairchild MD  Treatment Team: Attending Provider: Dami Tamayo MD; Utilization Review: Wilfred Lynn RN; Care Manager: Adonis Jones [facility]; Utilization Review: Serge Crews RN    Subjective:       Ms. 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 She had been followed by PCP for new onset RUQ pain, dizziness. She had RUQ US  showing  Fatty liver and gastric emptying study showing moderate delay. Labs show elevated total bilirubin and LFTs. Hepatitis panel is negative. HGB is near her baseline 9-10 range (9.9). Anemia Workup shows chronic disease. History is difficult to obtain from patient due to her cognition and father at bedside has little other information. She was admitted for GI consult and s/p EGD showing mild esophagitis and gastric erosion. She is on IV protonix with decline in HGB to 7.4 without luis manuel GI loss. she plans to have outpatient GI followup to discuss gastroparesis and HIDA scan.  Plans are for home once stable     618 mother present, tolerant of her diet but wants more, no luis manuel GI blood loss, has menstural cycle today, no BM since admit, has neck pain and soreness of glands        Objective:     Patient Vitals for the past 24 hrs:   Temp Pulse Resp BP SpO2   18 1759 99.9 °F (37.7 °C) (!) 101 22 166/90 97 %   18 1735 99.6 °F (37.6 °C) (!) 109 22 166/87 97 %   18 1650 100.1 °F (37.8 °C) (!) 103 22 163/89 96 %   18 1550 98.6 °F (37 °C) (!) 106 22 160/87 96 %   18 1454 99.5 °F (37.5 °C) (!) 107 22 162/86 97 %   18 1430 99.1 °F (37.3 °C) (!) 108 20 152/86 97 %   18 1211 98.8 °F (37.1 °C) (!) 109 20 149/68 96 %   18 0737 98.9 °F (37.2 °C) (!) 113 20 157/79 94 %   06/02/18 0416 99.7 °F (37.6 °C) (!) 111 20 132/77 92 %   06/02/18 0001 99.9 °F (37.7 °C) (!) 111 20 154/81 93 %   06/01/18 1930 100 °F (37.8 °C) (!) 110 22 144/85 94 %     Oxygen Therapy  O2 Sat (%): 97 % (06/02/18 1759)  Pulse via Oximetry: 107 beats per minute (06/01/18 1128)  O2 Device: Room air (06/02/18 1445)    Intake/Output Summary (Last 24 hours) at 06/02/18 1914  Last data filed at 06/02/18 1639   Gross per 24 hour   Intake              831 ml   Output             3350 ml   Net            -2519 ml         General:    Well nourished. Alert. No distress    CV:   RRR. No murmur, rub, or gallop.trace edema  Lungs:   CTAB. No wheezing, rhonchi, or rales. Abdomen:   Soft, nontender, nondistended. Decreased BS  Extremities: Warm and dry. No cyanosis  Skin:     No rashes or jaundice. Neuro:  No gross focal deficits    Data Review:  I have reviewed all labs, meds, telemetry events, and studies from the last 24 hours. Recent Results (from the past 24 hour(s))   GLUCOSE, POC    Collection Time: 06/02/18 12:05 AM   Result Value Ref Range    Glucose (POC) 112 (H) 65 - 409 mg/dL   METABOLIC PANEL, COMPREHENSIVE    Collection Time: 06/02/18  4:14 AM   Result Value Ref Range    Sodium 142 136 - 145 mmol/L    Potassium 3.4 (L) 3.5 - 5.1 mmol/L    Chloride 106 98 - 107 mmol/L    CO2 25 21 - 32 mmol/L    Anion gap 11 7 - 16 mmol/L    Glucose 92 65 - 100 mg/dL    BUN 12 6 - 23 MG/DL    Creatinine 0.55 (L) 0.6 - 1.0 MG/DL    GFR est AA >60 >60 ml/min/1.73m2    GFR est non-AA >60 >60 ml/min/1.73m2    Calcium 7.9 (L) 8.3 - 10.4 MG/DL    Bilirubin, total 3.5 (H) 0.2 - 1.1 MG/DL    ALT (SGPT) 323 (H) 12 - 65 U/L    AST (SGOT) 282 (H) 15 - 37 U/L    Alk.  phosphatase 610 (H) 50 - 136 U/L    Protein, total 6.6 6.3 - 8.2 g/dL    Albumin 2.4 (L) 3.5 - 5.0 g/dL    Globulin 4.2 (H) 2.3 - 3.5 g/dL    A-G Ratio 0.6 (L) 1.2 - 3.5     CBC WITH AUTOMATED DIFF    Collection Time: 06/02/18  4:14 AM   Result Value Ref Range    WBC 8.5 4.3 - 11.1 K/uL    RBC 2.88 (L) 4.05 - 5.25 M/uL    HGB 7.4 (L) 11.7 - 15.4 g/dL    HCT 23.5 (L) 35.8 - 46.3 %    MCV 81.6 79.6 - 97.8 FL    MCH 25.7 (L) 26.1 - 32.9 PG    MCHC 31.5 31.4 - 35.0 g/dL    RDW 16.8 (H) 11.9 - 14.6 %    PLATELET 817 477 - 461 K/uL    MPV 10.0 (L) 10.8 - 14.1 FL    DF AUTOMATED      NEUTROPHILS 43 43 - 78 %    LYMPHOCYTES 42 13 - 44 %    MONOCYTES 7 4.0 - 12.0 %    EOSINOPHILS 5 0.5 - 7.8 %    BASOPHILS 1 0.0 - 2.0 %    IMMATURE GRANULOCYTES 2 0.0 - 5.0 %    ABS. NEUTROPHILS 3.8 1.7 - 8.2 K/UL    ABS. LYMPHOCYTES 3.6 0.5 - 4.6 K/UL    ABS. MONOCYTES 0.6 0.1 - 1.3 K/UL    ABS. EOSINOPHILS 0.4 0.0 - 0.8 K/UL    ABS. BASOPHILS 0.1 0.0 - 0.2 K/UL    ABS. IMM.  GRANS. 0.2 0.0 - 0.5 K/UL   GLUCOSE, POC    Collection Time: 06/02/18  5:49 AM   Result Value Ref Range    Glucose (POC) 119 (H) 65 - 100 mg/dL   TYPE + CROSSMATCH    Collection Time: 06/02/18 12:03 PM   Result Value Ref Range    Crossmatch Expiration 06/05/2018     ABO/Rh(D) A POSITIVE     Antibody screen NEG     Unit number [id number]     Blood component type [facility] [place]     Unit division 00     Status of unit ISSUED     Crossmatch result Compatible     Unit number [id number]     Blood component type [facility] [place]     Unit division 00     Status of unit ISSUED     Crossmatch result Compatible    HGB & HCT    Collection Time: 06/02/18 12:03 PM   Result Value Ref Range    HGB 7.4 (L) 11.7 - 15.4 g/dL    HCT 23.0 (L) 35.8 - 46.3 %   GLUCOSE, POC    Collection Time: 06/02/18 12:19 PM   Result Value Ref Range    Glucose (POC) 106 (H) 65 - 100 mg/dL   GLUCOSE, POC    Collection Time: 06/02/18  4:47 PM   Result Value Ref Range    Glucose (POC) 115 (H) 65 - 100 mg/dL        All Micro Results     None          Current Meds:  Current Facility-Administered Medications   Medication Dose Route Frequency    0.9% sodium chloride infusion 250 mL  250 mL IntraVENous PRN    levothyroxine (SYNTHROID) tablet 200 mcg  200 mcg Oral ACB    ALPRAZolam (XANAX) tablet 0.5 mg  0.5 mg Oral TID PRN    atorvastatin (LIPITOR) tablet 20 mg  20 mg Oral DAILY    desvenlafaxine ER tablet 50 mg (Patient Supplied)  50 mg Oral DAILY    fludrocortisone (FLORINEF) tablet 100 mcg  0.1 mg Oral DAILY    polyethylene glycol (MIRALAX) packet 17 g  17 g Oral DAILY    risperiDONE (RisperDAL) tablet 1 mg  1 mg Oral QID    sodium chloride (NS) flush 5-10 mL  5-10 mL IntraVENous Q8H    sodium chloride (NS) flush 5-10 mL  5-10 mL IntraVENous PRN    acetaminophen (TYLENOL) tablet 650 mg  650 mg Oral Q6H PRN    ondansetron (ZOFRAN) injection 4 mg  4 mg IntraVENous Q4H PRN    pantoprazole (PROTONIX) 40 mg in sodium chloride 0.9% 10 mL injection  40 mg IntraVENous Q12H    hydrALAZINE (APRESOLINE) 20 mg/mL injection 10 mg  10 mg IntraVENous Q6H PRN    0.9% sodium chloride infusion  100 mL/hr IntraVENous CONTINUOUS    insulin lispro (HUMALOG) injection   SubCUTAneous Q6H    QUEtiapine (SEROquel) tablet 600 mg  600 mg Oral QHS       Diet:  DIET FULL LIQUID    Other Studies (last 24 hours):  No results found.     Assessment and Plan:     Hospital Problems as of 6/2/2018  Date Reviewed: 5/22/2018          Codes Class Noted - Resolved POA    Cholelithiasis ICD-10-CM: K80.20  ICD-9-CM: 574.20  6/1/2018 - Present Unknown        * (Principal)Gastric erosion ICD-10-CM: K25.9  ICD-9-CM: 535.40  6/1/2018 - Present Yes        Gastroparesis ICD-10-CM: K31.84  ICD-9-CM: 536.3  6/1/2018 - Present Yes        Cognitive developmental delay (Chronic) ICD-10-CM: F81.9  ICD-9-CM: 315.9  5/31/2018 - Present Yes        Elevated liver function tests ICD-10-CM: R79.89  ICD-9-CM: 790.6  5/31/2018 - Present Yes        Anemia (Chronic) ICD-10-CM: D64.9  ICD-9-CM: 285.9  5/31/2018 - Present Yes        Elevated blood pressure reading ICD-10-CM: R03.0  ICD-9-CM: 796.2  5/31/2018 - Present Yes        Hypomagnesemia ICD-10-CM: E83.42  ICD-9-CM: 275.2  5/31/2018 - Present Yes        Hyperglycemia (Chronic) ICD-10-CM: R73.9  ICD-9-CM: 790.29  5/31/2018 - Present Yes        Esophagitis ICD-10-CM: K20.9  ICD-9-CM: 530.10  5/4/2017 - Present Yes    Overview Signed 5/5/2017  2:26 PM by Rod Loera MD     8-3218 EGD showed healing esophagitis improved compared to prior EGD - plan continue omprezole             OCD (obsessive compulsive disorder) ICD-10-CM: F42.9  ICD-9-CM: 300.3  Unknown - Present Yes        Hypothyroidism (Chronic) ICD-10-CM: E03.9  ICD-9-CM: 244.9  10/13/2014 - Present Yes        Depression (Chronic) ICD-10-CM: F32.9  ICD-9-CM: 888  10/13/2014 - Present Yes        GERD (gastroesophageal reflux disease) (Chronic) ICD-10-CM: K21.9  ICD-9-CM: 530.81  10/13/2014 - Present Yes              PLAN:         · GERD/gastric erosion and esophagitis :protonix BID,avoid NSAIDS/ stopped ASA, on full liquids  · Anemia: trend HGB, 2 units PRBC ordered, will need to regroup with GI  · RUQ ABD pain and elevated LFTS: outpatient HIDA  · Gastroparesis: outpatient followup, advised smaller meals  · Hyperglycemia: holding metformin, continue sliding scale insulin   · OCD/schizophrenia: continue home antidepressants   ·  hypomagnesemia: replaced  · Hypothyroidism: decreased synthroid dose and recheck in 8 weeks   · Tachycardia: noted as sinus tach and chronic         Discharge planning:  Expect her to discharge home with parents once stable 6-2   DVT ppx: SCD  Code status:  Full  Estimated LOS:  Greater than 2 midnights  Risk:  high  Care plan: mother carolyn [phone]  Signed:  Jono Gardner MD    Signed:  Jono Gardner MD

## 2018-06-02 NOTE — PROGRESS NOTES
END OF SHIFT NOTE:    INTAKE/OUTPUT  06/01 0701 - 06/02 0700  In: 2037 [I.V.:2037]  Out: 1800 [Urine:1800]  Voiding: YES  Catheter: NO  Drain:              Flatus: Patient does have flatus present. Stool:  0 occurrences. Characteristics:       Emesis: 0 occurrences. Characteristics:   Emesis Assessment  Appearance: Coffee ground  Emesis Amount: Medium    VITAL SIGNS  Patient Vitals for the past 12 hrs:   Temp Pulse Resp BP SpO2   06/02/18 0416 99.7 °F (37.6 °C) (!) 111 20 132/77 92 %   06/02/18 0001 99.9 °F (37.7 °C) (!) 111 20 154/81 93 %   06/01/18 1930 100 °F (37.8 °C) (!) 110 22 144/85 94 %       Pain Assessment  Pain Intensity 1: 0 (06/01/18 1452)        Patient Stated Pain Goal: 0    Ambulating  Yes    Shift report given to oncoming nurse at the bedside.     John Hernandez, RN

## 2018-06-02 NOTE — PROGRESS NOTES
Virtual Utilization Review RN  has determined this patient meets the Condition Code 44 criteria under the Medicare guidelines for change from Inpatient to observation status. Admission status has been changed in the computer system. A copy of the Utilization Review RN documentation and the [facility] letter explaining the outpatient/observation services was given to patient/family representative with verbal explanation and verbal understanding was received about information given. Letter signed by patient with date and time. Signed copy placed in the patient's chart for scanning by HIS and copy left at bedside for patient information.

## 2018-06-03 NOTE — PROGRESS NOTES
END OF SHIFT NOTE:    INTAKE/OUTPUT  06/02 0701 - 06/03 0700  In: 609 [I.V.:609]  Out: 4150 [Urine:4150]  Voiding: YES  Catheter: NO  Drain:              Flatus: Patient does have flatus present. Stool:  1 occurrences. Characteristics:       Emesis: 0 occurrences. Characteristics:   Emesis Assessment  Appearance: Coffee ground  Emesis Amount: Medium    VITAL SIGNS  Patient Vitals for the past 12 hrs:   Temp Pulse Resp BP SpO2   06/03/18 0545 - (!) 113 - (!) 168/100 -   06/03/18 0344 98.3 °F (36.8 °C) (!) 113 20 (!) 188/91 94 %   06/02/18 2348 98.2 °F (36.8 °C) (!) 101 20 (!) 173/103 94 %   06/02/18 2055 100.1 °F (37.8 °C) (!) 103 20 167/87 95 %       Pain Assessment  Pain Intensity 1: 0 (06/02/18 1445)        Patient Stated Pain Goal: 0    Ambulating  Yes  Given prn doses of hydralazine twice this shift for elevated bp. Shift report given to oncoming nurse at the bedside.     James Pozo RN

## 2018-06-03 NOTE — DISCHARGE SUMMARY
Hospitalist Discharge Summary     Admit Date:  2018  3:51 PM   Name:  Lashell Castano   Age:  44 y.o.  :  1980   MRN:  [id number]   PCP:  Riddhi Hubbard MD  Treatment Team: Attending Provider: Geoffrey Jose MD; Utilization Review: Caryn Ramos RN; Care Manager: Pal Flynn [facility] Utilization Review: Tarah Patel RN    Problem List for this Hospitalization:  Hospital Problems as of 6/3/2018  Date Reviewed: 2018          Codes Class Noted - Resolved POA    Cholelithiasis ICD-10-CM: K80.20  ICD-9-CM: 574.20  2018 - Present Unknown        * (Principal)Gastric erosion ICD-10-CM: K25.9  ICD-9-CM: 535.40  2018 - Present Yes        Gastroparesis ICD-10-CM: K31.84  ICD-9-CM: 536.3  2018 - Present Yes        Cognitive developmental delay (Chronic) ICD-10-CM: F81.9  ICD-9-CM: 315.9  2018 - Present Yes        Elevated liver function tests ICD-10-CM: R79.89  ICD-9-CM: 790.6  2018 - Present Yes        Anemia (Chronic) ICD-10-CM: D64.9  ICD-9-CM: 285.9  2018 - Present Yes        Elevated blood pressure reading ICD-10-CM: R03.0  ICD-9-CM: 796.2  2018 - Present Yes        Hypomagnesemia ICD-10-CM: E83.42  ICD-9-CM: 275.2  2018 - Present Yes        Hyperglycemia (Chronic) ICD-10-CM: R73.9  ICD-9-CM: 790.29  2018 - Present Yes        Esophagitis ICD-10-CM: K20.9  ICD-9-CM: 530.10  2017 - Present Yes    Overview Signed 2017  2:26 PM by Riddhi Hubbard MD     0-0323 EGD showed healing esophagitis improved compared to prior EGD - plan continue omprezole             OCD (obsessive compulsive disorder) ICD-10-CM: F42.9  ICD-9-CM: 300.3  Unknown - Present Yes        Hypothyroidism (Chronic) ICD-10-CM: E03.9  ICD-9-CM: 244.9  10/13/2014 - Present Yes        Depression (Chronic) ICD-10-CM: F32.9  ICD-9-CM: 365  10/13/2014 - Present Yes        GERD (gastroesophageal reflux disease) (Chronic) ICD-10-CM: K21.9  ICD-9-CM: 530.81  10/13/2014 - Present Yes Hospital Course:  Ms. 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 She had been followed by PCP for new onset RUQ pain, dizziness. She had RUQ US  showing  Fatty liver and gastric emptying study showing moderate delay. Labs show elevated total bilirubin and LFTs. Hepatitis panel is negative. HGB was near her baseline 9-10 range (9.9). Anemia Workup shows chronic disease. History is difficult to obtain from patient due to her cognition and father at bedside has little other information. She was admitted for GI consult and s/p EGD showing mild esophagitis and gastric erosion. She is on BID protonix with decline in HGB to 7.4 without luis manuel GI loss. She did have 2 units PRBC with improved HGB to 12.5 she plans to have outpatient GI followup to discuss gastroparesis and HIDA scan. She is advised smaller lower fat meals. Plans are for home with parents. medications held are asa and statin. Her synthroid dose was decreased and TSH needs check in 8 weeks. Follow up instructions and discharge meds at bottom of this note. Plan was discussed with mother. All questions answered. Patient was stable at time of discharge. Diagnostic Imaging/Tests:   Nm Gastric Empty Stdy    Result Date: 5/31/2018  NUCLEAR MEDICINE SOLID PHASE GASTRIC EMPTYING EXAM. HISTORY:  Abnormal liver enzymes and fatty liver. Radiopharmaceutical: 1 mCi Tc 99m sulfur colloid standard egg meal. FINDINGS: Following ingestion of radiopharmaceutical imaging was performed out to 4 hours following the [facility] ([facility]) standard procedure. Gastric retention is calculated at 72 % at 1 hour. Gastric retention is calculated at 35% at 4 hours. Abnormal rapid emptying is less than 30% retention at one hour. Abnormal delayed emptying is greater than 10% retention 4 hours.      IMPRESSION: Moderately delayed gastric emptying. Grade 1: Mild delayed emptying is less than 20% retention at 4 hours. Grade 2: Moderately emptying is 21-35% retention at 4 hours. Grade 3: Severe delayed emptying is greater than 50% retention at 4 hours. Xr Chest Pa Lat    Result Date: 5/31/2018  CHEST X-RAY, 2 views 5/31/2018 History: Rapid heart rate. Technique: PA and lateral views of the chest. Comparison: None. Findings: The cardiac silhouette is normal in respect to size. The lungs are expanded without evidence for pneumothorax. No consolidation, or evidence of pleural effusion is seen. Only basilar reticular densities are seen favored to represent atelectasis or scarring. The bony thorax demonstrates no acute changes. What appears to be a shunt catheter overlies the medial right neck base. The upper abdomen is unremarkable in appearance. IMPRESSION: 1. Basilar scarring versus atelectasis without significant acute changes otherwise evident by plain film imaging. [id number]    Result Date: 5/31/2018  Right Upper Quadrant Ultrasound INDICATION:  Elevated total bilirubin and LFTs, right upper quadrant pain; history of GERD, brain tumor, hyperlipidemia,  shunt COMPARISON: 6/26/2012 and 4/26/2018 TECHNIQUE:  Sonographic gray scale and Doppler images were obtained of the right upper quadrant of the abdomen. Technologist reports best possible imaging as the patient had difficulty with breath holding. FINDINGS: There is mildly heterogeneous and increased echogenicity throughout the liver which can limit sensitivity for masses. There are no discrete lesions in the visualized portions of the liver. Liver size is 18.5 cm, within normal limits. Main portal vein is patent on Doppler imaging. There is no bile duct dilatation. The common bile duct diameter is 5 mm. The gallbladder is partially contracted, and the patient reportedly had lunch today. No gallstones are seen.  There is borderline nonspecific 3 mm gallbladder wall thickening, likely due to contracted status. Sonographic Mccain's sign is not seen. There are no discrete lesions in the limited visualized portions of the pancreas, mostly obscured by overlying bowel. The right kidney measures 10.1 cm in length. Color Doppler demonstrates expected right renal flow. There is no hydronephrosis. There is no evidence of a solid renal mass. There is no evidence of ascites. The aorta and IVC are patent on Doppler imaging. Aortic diameter is within normal limits. IMPRESSION: 1. Mildly heterogeneous echogenic liver parenchyma is nonspecific, most often steatosis. 2. No acute abnormality otherwise. Echocardiogram results:  No results found for this visit on 05/31/18.       All Micro Results     None          Labs: Results:       BMP, Mg, Phos Recent Labs      06/03/18   1156  06/03/18   1117  06/02/18   0414  06/01/18   0439   NA  140  139  142  142   K  3.8  3.5  3.4*  3.5   CL  104  105  106  105   CO2  23  25  25  24   AGAP  13  9  11  13   BUN  5*  6  12  20   CREA  0.61  0.66  0.55*  0.59*   CA  8.6  8.5  7.9*  7.7*   GLU  91  111*  92  105*   MG   --    --    --   2.5*      CBC Recent Labs      06/03/18   1117  06/03/18   0707  06/02/18   1203  06/02/18   0414  06/01/18   0439   WBC   --   9.9   --   8.5  7.9   RBC   --   4.42   --   2.88*  3.11*   HGB  12.7  12.5  7.4*  7.4*  8.0*   HCT  37.4  36.1  23.0*  23.5*  25.2*   PLT   --   275   --   307  310   GRANS   --   63   --   43  56   LYMPH   --   25   --   42  34   EOS   --   4   --   5  1   MONOS   --   7   --   7  7   BASOS   --   1   --   1  1   IG   --   2   --   2  1   ANEU   --   6.2   --   3.8  4.4   ABL   --   2.5   --   3.6  2.7   SANTOS   --   0.4   --   0.4  0.1   ABM   --   0.7   --   0.6  0.6   ABB   --   0.1   --   0.1  0.1   AIG   --   0.2   --   0.2  0.1      LFT Recent Labs      06/03/18   1156  06/03/18   1117  06/02/18   0414   SGOT  303*  315*  282*   ALT  369*  395*  323*   AP  749*  773*  610*   TP  7.3 7.3  6.6   ALB  2.5*  2.6*  2.4*   GLOB  4.8*  4.7*  4.2*   AGRAT  0.5*  0.6*  0.6*      Cardiac Testing Lab Results   Component Value Date/Time     10/13/2014 06:52 PM    Troponin-I, Qt. <0.02 (L) 05/31/2018 03:36 PM      Coagulation Tests Lab Results   Component Value Date/Time    Prothrombin time 10.0 12/27/2015 10:20 AM    Prothrombin time 10.3 01/07/2015 02:00 PM    INR 1.0 12/27/2015 10:20 AM    INR 1.0 01/07/2015 02:00 PM    aPTT 26.7 01/07/2015 02:00 PM      A1c Lab Results   Component Value Date/Time    Hemoglobin A1c 5.9 05/31/2018 03:36 PM    Hemoglobin A1c 6.3 (H) 04/12/2018 10:26 AM    Hemoglobin A1c 6.3 (H) 11/08/2017 08:38 AM      Lipid Panel Lab Results   Component Value Date/Time    Cholesterol, total 123 09/22/2016 11:10 AM    HDL Cholesterol 31 (L) 09/22/2016 11:10 AM    LDL, calculated 61 09/22/2016 11:10 AM    VLDL, calculated 31 09/22/2016 11:10 AM    Triglyceride 156 09/22/2016 11:10 AM    CHOL/HDL Ratio 4.0 09/22/2016 11:10 AM      Thyroid Panel Lab Results   Component Value Date/Time    TSH 0.005 (L) 05/31/2018 03:36 PM    TSH 0.007 (L) 10/14/2014 04:10 AM    T4, Total 15.4 (H) 10/14/2014 07:00 PM    T4, Free 1.59 04/12/2018 10:26 AM    T4, Free 1.89 (H) 11/08/2017 08:38 AM        Most Recent UA Lab Results   Component Value Date/Time    Color Yellow 05/01/2018 11:47 AM    Appearance Clear 05/01/2018 11:47 AM    pH (UA) 6.0 05/01/2018 11:47 AM    Protein Negative 05/01/2018 11:47 AM    Glucose Negative 05/01/2018 11:47 AM    Ketone Negative 05/01/2018 11:47 AM    Bilirubin Negative 05/01/2018 11:47 AM    Blood Negative 05/01/2018 11:47 AM    Urobilinogen 0.2 E.U./dL 05/01/2018 11:47 AM    Nitrites Negative 05/01/2018 11:47 AM    Leukocyte Esterase Negative 05/01/2018 11:47 AM        Allergies   Allergen Reactions    Geodon [Ziprasidone Hcl] Rash       There is no immunization history on file for this patient.     All Labs from Last 24 Hrs:  Recent Results (from the past 24 hour(s)) GLUCOSE, POC    Collection Time: 06/02/18  4:47 PM   Result Value Ref Range    Glucose (POC) 115 (H) 65 - 100 mg/dL   OCCULT BLOOD, STOOL    Collection Time: 06/02/18  9:30 PM   Result Value Ref Range    Occult blood, stool POSITIVE (A) NEG     GLUCOSE, POC    Collection Time: 06/02/18 11:39 PM   Result Value Ref Range    Glucose (POC) 89 65 - 100 mg/dL   GLUCOSE, POC    Collection Time: 06/03/18  5:25 AM   Result Value Ref Range    Glucose (POC) 92 65 - 100 mg/dL   CBC WITH AUTOMATED DIFF    Collection Time: 06/03/18  7:07 AM   Result Value Ref Range    WBC 9.9 4.3 - 11.1 K/uL    RBC 4.42 4.05 - 5.25 M/uL    HGB 12.5 11.7 - 15.4 g/dL    HCT 36.1 35.8 - 46.3 %    MCV 81.7 79.6 - 97.8 FL    MCH 28.3 26.1 - 32.9 PG    MCHC 34.6 31.4 - 35.0 g/dL    RDW 16.3 (H) 11.9 - 14.6 %    PLATELET 271 109 - 573 K/uL    MPV 11.5 10.8 - 14.1 FL    NEUTROPHILS 63 43 - 78 %    LYMPHOCYTES 25 13 - 44 %    MONOCYTES 7 4.0 - 12.0 %    EOSINOPHILS 4 0.5 - 7.8 %    BASOPHILS 1 0.0 - 2.0 %    IMMATURE GRANULOCYTES 2 0.0 - 5.0 %    ABS. NEUTROPHILS 6.2 1.7 - 8.2 K/UL    ABS. LYMPHOCYTES 2.5 0.5 - 4.6 K/UL    ABS. MONOCYTES 0.7 0.1 - 1.3 K/UL    ABS. EOSINOPHILS 0.4 0.0 - 0.8 K/UL    ABS. BASOPHILS 0.1 0.0 - 0.2 K/UL    ABS. IMM.  GRANS. 0.2 0.0 - 0.5 K/UL    RBC COMMENTS SLIGHT  ANISOCYTOSIS + POIKILOCYTOSIS        WBC COMMENTS Result Confirmed By Smear      PLATELET COMMENTS ADEQUATE      DF AUTOMATED     HGB & HCT    Collection Time: 06/03/18 11:17 AM   Result Value Ref Range    HGB 12.7 11.7 - 15.4 g/dL    HCT 37.4 35.8 - 54.7 %   METABOLIC PANEL, COMPREHENSIVE    Collection Time: 06/03/18 11:17 AM   Result Value Ref Range    Sodium 139 136 - 145 mmol/L    Potassium 3.5 3.5 - 5.1 mmol/L    Chloride 105 98 - 107 mmol/L    CO2 25 21 - 32 mmol/L    Anion gap 9 7 - 16 mmol/L    Glucose 111 (H) 65 - 100 mg/dL    BUN 6 6 - 23 MG/DL    Creatinine 0.66 0.6 - 1.0 MG/DL    GFR est AA >60 >60 ml/min/1.73m2    GFR est non-AA >60 >60 ml/min/1.73m2 Calcium 8.5 8.3 - 10.4 MG/DL    Bilirubin, total 4.6 (H) 0.2 - 1.1 MG/DL    ALT (SGPT) 395 (H) 12 - 65 U/L    AST (SGOT) 315 (H) 15 - 37 U/L    Alk. phosphatase 773 (H) 50 - 136 U/L    Protein, total 7.3 6.3 - 8.2 g/dL    Albumin 2.6 (L) 3.5 - 5.0 g/dL    Globulin 4.7 (H) 2.3 - 3.5 g/dL    A-G Ratio 0.6 (L) 1.2 - 3.5     GLUCOSE, POC    Collection Time: 06/03/18 11:45 AM   Result Value Ref Range    Glucose (POC) 96 65 - 093 mg/dL   METABOLIC PANEL, COMPREHENSIVE    Collection Time: 06/03/18 11:56 AM   Result Value Ref Range    Sodium 140 136 - 145 mmol/L    Potassium 3.8 3.5 - 5.1 mmol/L    Chloride 104 98 - 107 mmol/L    CO2 23 21 - 32 mmol/L    Anion gap 13 7 - 16 mmol/L    Glucose 91 65 - 100 mg/dL    BUN 5 (L) 6 - 23 MG/DL    Creatinine 0.61 0.6 - 1.0 MG/DL    GFR est AA >60 >60 ml/min/1.73m2    GFR est non-AA >60 >60 ml/min/1.73m2    Calcium 8.6 8.3 - 10.4 MG/DL    Bilirubin, total 4.6 (H) 0.2 - 1.1 MG/DL    ALT (SGPT) 369 (H) 12 - 65 U/L    AST (SGOT) 303 (H) 15 - 37 U/L    Alk.  phosphatase 749 (H) 50 - 136 U/L    Protein, total 7.3 6.3 - 8.2 g/dL    Albumin 2.5 (L) 3.5 - 5.0 g/dL    Globulin 4.8 (H) 2.3 - 3.5 g/dL    A-G Ratio 0.5 (L) 1.2 - 3.5         Discharge Exam:  Patient Vitals for the past 24 hrs:   Temp Pulse Resp BP SpO2   06/03/18 1144 99.8 °F (37.7 °C) (!) 110 20 145/80 96 %   06/03/18 0734 99.5 °F (37.5 °C) (!) 126 20 168/87 94 %   06/03/18 0545 - (!) 113 - (!) 168/100 -   06/03/18 0344 98.3 °F (36.8 °C) (!) 113 20 (!) 188/91 94 %   06/02/18 2348 98.2 °F (36.8 °C) (!) 101 20 (!) 173/103 94 %   06/02/18 2055 100.1 °F (37.8 °C) (!) 103 20 167/87 95 %   06/02/18 1759 99.9 °F (37.7 °C) (!) 101 22 166/90 97 %   06/02/18 1735 99.6 °F (37.6 °C) (!) 109 22 166/87 97 %   06/02/18 1650 100.1 °F (37.8 °C) (!) 103 22 163/89 96 %     Oxygen Therapy  O2 Sat (%): 96 % (06/03/18 1144)  Pulse via Oximetry: 107 beats per minute (06/01/18 1128)  O2 Device: Room air (06/03/18 0748)    Intake/Output Summary (Last 24 hours) at 06/03/18 1605  Last data filed at 06/03/18 1033   Gross per 24 hour   Intake              810 ml   Output             3300 ml   Net            -2490 ml              General:                    Well nourished. Alert. No distress    CV:                                      RRR. No murmur, rub, or gallop.trace edema  Lungs:                       CTAB. No wheezing, rhonchi, or rales. Abdomen:                  Soft, nontender, nondistended. Decreased BS  Extremities:               Warm and dry. No cyanosis  Skin:                                    No rashes or jaundice. Neuro:                                 No gross focal deficits          Discharge Info:   Current Discharge Medication List      CONTINUE these medications which have CHANGED    Details   esomeprazole (NEXIUM) 40 mg capsule Take 1 cap by mouth twice daily  Indications: gastroesophageal reflux disease  Qty: 60 Cap, Refills: 0      levothyroxine (SYNTHROID) 200 mcg tablet Take 1 Tab by mouth Daily (before breakfast). Qty: 30 Tab, Refills: 0         CONTINUE these medications which have NOT CHANGED    Details   metFORMIN (GLUCOPHAGE) 500 mg tablet Take  by mouth two (2) times daily (with meals). ofloxacin (FLOXIN) 0.3 % otic solution Administer 5 Drops in left ear two (2) times a day. Qty: 5 mL, Refills: 6    Associated Diagnoses: Otorrhea of left ear      Calcium-Cholecalciferol, D3, (CALCIUM 600 WITH VITAMIN D3) 600 mg(1,500mg) -400 unit cap Take  by mouth. fludrocortisone (FLORINEF) 0.1 mg tablet Take 1 Tab by mouth daily. Qty: 90 Tab, Refills: 3      polyethylene glycol (MIRALAX) 17 gram/dose powder Take 17 g by mouth daily. MULTIVITAMIN PO Take  by mouth. risperiDONE (RISPERDAL) 2 mg tablet 1 mg four (4) times daily. cholecalciferol, VITAMIN D3, (VITAMIN D3) 5,000 unit tab tablet Take  by mouth daily. Desvenlafaxine SR (PRISTIQ) 50 mg tablet Take 50 mg by mouth.       ALPRAZolam (XANAX) 1 mg tablet Take 1 mg by mouth three (3) times daily as needed for Anxiety. Cetirizine (ZYRTEC) 10 mg cap Take  by mouth daily as needed. Indications: ALLERGIC RHINITIS      QUEtiapine (SEROQUEL) 300 mg tablet Take 600 mg by mouth nightly. Indications: DELUSIONS      acetaminophen (TYLENOL) 500 mg tablet Take 1 Tab by mouth every six (6) hours as needed for Pain. STOP taking these medications       atorvastatin (LIPITOR) 20 mg tablet Comments:   Reason for Stopping:         aspirin delayed-release 81 mg tablet Comments:   Reason for Stopping:                 Disposition: home    Activity: Activity as tolerated  Diet: DIET GI SOFT No options chosen-low fat    Follow-up Appointments   Procedures    FOLLOW UP VISIT Appointment in: 3 - 5 Days [facility] for [facility] and gastroparesis followup thanks     [facility] and gastroparesis followup thanks     Standing Status:   Standing     Number of Occurrences:   1     Order Specific Question:   Appointment in     Answer:   3 - 5 Days         Follow-up Information     Follow up With Details Comments Santos Jacome MD   [facility] [address]  [phone]            Time spent in patient discharge planning and coordination 45 minutes.     Signed:  Skeet Brittle, MD

## 2018-06-09 PROBLEM — R17 JAUNDICE: Status: ACTIVE | Noted: 2018-01-01

## 2018-06-09 NOTE — ED NOTES
TRANSFER - OUT REPORT:    Verbal report given to Page Flores RN(name) on Blaire Mota  being transferred to  515(unit) for routine progression of care       Report consisted of patients Situation, Background, Assessment and   Recommendations(SBAR). Information from the following report(s) ED Summary was reviewed with the receiving nurse. Lines:   Peripheral IV 06/09/18 Left Arm (Active)        Opportunity for questions and clarification was provided.       Patient transported with:

## 2018-06-09 NOTE — PROGRESS NOTES
Patient being admitted to floor from [address] knows family from previous admission    Catracho Barclay, staff Herminia taylor [address], [address]  /   [email or web address]

## 2018-06-09 NOTE — ED PROVIDER NOTES
HPI Comments: History is obtained from the patient's mother. She states that the patient started getting jaundiced 3 days ago. It is been gradual in onset and getting progressively worse. Mom denies similar symptoms in the past.  She states the patient has not had any abdominal pain since the onset of the jaundice. Her past medical history is significant for a recent admission for a GI bleed requiring blood transfusion. She had an upper GI study done which showed no definitive diagnosis she was discharged home. She followed up in GI early last week and had blood work done. Mom states the jaundice started the next day. She had had abdominal pain with her GI bleed but hasn't had none since then. She states that she had an ultrasound for her abdominal pain which was unremarkable. GI has scheduled an outpatient HIDA scan which has yet to be done. Elements of this note were created using speech recognition software. As such, errors of speech recognition may be present. Patient is a 44 y.o. female presenting with jaundice. The history is provided by a relative. Jaundice    Pertinent negatives include no fever, no nausea and no vomiting.         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 Gallardo [facility]     Paranoid type delusional disorder ([facility] [address]     from chemo as child- had brain tumor 1984    Stroke [facility]) 10-12-14    TIA; taking aspirin daily; pt released from neurologist care per mother    Tachycardia 11/8/2016    TIA (transient ischemic attack) 10/13/2014    Vasovagal syncope 3/2/2017     (ventriculoperitoneal) shunt status     not functioning based on 2016 shunt xray series       Past Surgical History:   Procedure Laterality Date    HX CRANIOTOMY  1984    HX CRANIOTOMY  2007    benign brain tumor    HX CSF SHUNT  1984    HX HEENT      tubes in ears    HX MYRINGOTOMY Bilateral     HX OTHER SURGICAL  1984    port placement for chemo    HX OTHER SURGICAL Left      SPHENOID BALLOON SINUPLASTY     HX OTHER SURGICAL      MENINGIOMA REMOVE     HX OTHER SURGICAL       SHUNT, CHEMO CATH         Family History:   Problem Relation Age of Onset    Diabetes Father     Hypertension Father    [facility] G[facility] Father     Arthritis-osteo Father     Osteoporosis Mother     Arthritis-osteo Mother     Allergic Rhinitis Mother        Social History     Social History    Marital status: SINGLE     Spouse name: N/A    Number of children: N/A    Years of education: N/A     Occupational History    Not on file. Social History Main Topics    Smoking status: Never Smoker    Smokeless tobacco: Never Used    Alcohol use No    Drug use: No    Sexual activity: Not Currently     Other Topics Concern    Seat Belt Yes     Social History Narrative    Lives at home with Mom and Dad         ALLERGIES: Geodon [ziprasidone hcl]    Review of Systems   Constitutional: Negative for chills and fever. Gastrointestinal: Positive for jaundice. Negative for nausea and vomiting. All other systems reviewed and are negative.       Vitals:    06/09/18 1224   BP: 120/79   Pulse: (!) 114   Resp: 16   Temp: 97.9 °F (36.6 °C)   SpO2: 94%            Physical Exam   Constitutional: She appears well-developed and well-nourished. HENT:   Head: Normocephalic and atraumatic. Eyes: Conjunctivae are normal. Pupils are equal, round, and reactive to light. Scleral icterus is present. Moderate jaundice to her face with scleral icterus   Neck: Normal range of motion. Neck supple. Cardiovascular: Normal rate and regular rhythm. Pulmonary/Chest: Effort normal and breath sounds normal.   Abdominal: Soft. Bowel sounds are normal. There is tenderness. There is no rebound. Mild diffuse upper abdominal tenderness to palpation   Musculoskeletal: She exhibits no edema or tenderness. Neurological: She is alert. A cranial nerve deficit is present. Left facial droop, left eye is closed   Skin: Skin is warm and dry. Nursing note and vitals reviewed. MDM  Number of Diagnoses or Management Options  Abdominal pain, right upper quadrant: new and requires workup  Jaundice: new and requires workup  Diagnosis management comments: 2:37 PM spoke with NP for [facility], discussed details of case. She requests that the patient get an MRCP. I will speak to the hospitalist to have the patient admitted.        Amount and/or Complexity of Data Reviewed  Clinical lab tests: ordered and reviewed  Tests in the radiology section of CPT®: ordered  Decide to obtain previous medical records or to obtain history from someone other than the patient: yes  Review and summarize past medical records: yes  Discuss the patient with other providers: yes    Risk of Complications, Morbidity, and/or Mortality  Presenting problems: moderate  Diagnostic procedures: moderate  Management options: moderate    Patient Progress  Patient progress: stable        ED Course       Procedures

## 2018-06-09 NOTE — PROGRESS NOTES
END OF SHIFT NOTE:    Intake/Output  06/09 0701 - 06/09 1900  In: 480 [P.O.:480]  Out: -    Voiding: YES  Catheter: NO  Drain:              Stool:  0 occurrences. Emesis:  1 occurrences. VITAL SIGNS  Patient Vitals for the past 12 hrs:   Temp Pulse Resp BP SpO2   06/09/18 1635 97.5 °F (36.4 °C) 98 18 152/80 97 %   06/09/18 1410 - 95 - 168/89 96 %   06/09/18 1224 97.9 °F (36.6 °C) (!) 114 16 120/79 94 %       Pain Assessment  Pain 1  Pain Scale 1: Numeric (0 - 10) (06/09/18 1615)  Pain Intensity 1: 0 (06/09/18 1615)  Patient Stated Pain Goal: 0 (06/09/18 1615)    Ambulating  Yes    Additional Information:     Shift report given to oncoming nurse at the bedside.     Alia Lee

## 2018-06-09 NOTE — ED NOTES
Patient ambulatory to the bathroom with family to provide a urine sample. Patient unable to provide urine sample at this time. Dr Ayers Smoke aware.

## 2018-06-09 NOTE — CONSULTS
[facility] Consult Note       Primary GI Physician: Dr. El Rojas    Referring Physician:  Dr. John Sultana Date:  6/9/2018    Admit Date:  6/9/2018    Chief Complaint:  Jaundice    Subjective:     History of Present Illness:  Patient is a 44 y.o. female with PMH of but not limited to GERD, anxiety, DM II, Prior Brain tumor with  shunt, parnoid schizophrenia, CVA,  Gastroparesis, who is seen in consultation at the request of Dr. Laine Reynaga for Elevated LFT and Jaundice. Mrs. Barbara Baker was recently admitted to [facility] from 5/31/18 until 6/3/18 for GERD and elevated LFT. Hepatitis panel was negative. EGD during admission showed mild esophagitis and gastric erosion. She was transfused 2 units of PRBC during that admission for HGB of 7.4. She was discharged with plans for outpatient HIDA scan. Labs during that admission revealed TB 3.8, AST 83, , . Medication adjustments were made to stop her Statins. Alpha 1 Antitripysin 218 (H), ceruloplasmin 41, AMA 4.3, SHYANNE negative, ASMA 8. She followed up in our office on 6/5/18 with Dr. Jackelin Hook and additional labs were checked. Her 6/5/18 labs revealed a TB 7.5, DB 6.8, IB 0.70, , , . Her mother has noticed Mrs. Barbara Baker becoming more jaundiced since that office visit and decided to bring her to the ED today. Labs in the ED today with TB 10.5, , , , WBC 12.1, . Mrs. Barbara Baker does not complain of pain but does have RUQ tenderness on exam. She has not had any recent fever/chills. Her metformin was stopped after her appointment with Dr. Jackelin Hook on 6/5 as possible cause of elevated LFT. This was the only new medication recently. She has not been on any antibiotics. Her mother states that she has been taking Tylenol 1000mg every few days. Denies any other OTC medications. She has not had any changes in bowel pattern. She is having dark colored urine. She has not had any nausea/vomiting. She has been afebrile. Her mother accompanies her and helps with history. 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 Jeny Haney Psychiatry     Paranoid type delusional disorder ([facility]     from chemo as child- had brain tumor 1984    Stroke ([facility] 10-12-14    TIA; taking aspirin daily; pt released from neurologist care per mother    Tachycardia 11/8/2016    TIA (transient ischemic attack) 10/13/2014    Vasovagal syncope 3/2/2017     (ventriculoperitoneal) shunt status     not functioning based on 2016 shunt xray series       PSH:  Past Surgical History:   Procedure Laterality Date    HX CRANIOTOMY  1984    HX CRANIOTOMY  2007    benign brain tumor    HX CSF SHUNT  1984    HX HEENT      tubes in ears    HX MYRINGOTOMY Bilateral     HX OTHER SURGICAL  1984    port placement for chemo    HX OTHER SURGICAL Left      SPHENOID BALLOON SINUPLASTY     HX OTHER SURGICAL      MENINGIOMA REMOVE     HX OTHER SURGICAL       SHUNT, CHEMO CATH       Allergies: Allergies   Allergen Reactions    Geodon [Ziprasidone Hcl] Rash       Home Medications:  Prior to Admission medications    Medication Sig Start Date End Date Taking?  Authorizing Provider   esomeprazole (NEXIUM) 40 mg capsule Take 1 cap by mouth twice daily  Indications: gastroesophageal reflux disease 6/3/18  Yes Camilla Mejia MD   levothyroxine (SYNTHROID) 200 mcg tablet Take 1 Tab by mouth Daily (before breakfast). 6/4/18  Yes Camilla Mejia MD   ofloxacin (FLOXIN) 0.3 % otic solution Administer 5 Drops in left ear two (2) times a day. 1/23/18  Yes Dontae Diehl,    Calcium-Cholecalciferol, D3, (CALCIUM 600 WITH VITAMIN D3) 600 mg(1,500mg) -400 unit cap Take  by mouth. Yes Historical Provider   fludrocortisone (FLORINEF) 0.1 mg tablet Take 1 Tab by mouth daily. 1/8/18  Yes Vinayak Newby MD   acetaminophen (TYLENOL) 500 mg tablet Take 1 Tab by mouth every six (6) hours as needed for Pain. Yes Himanshu Lopez MD   polyethylene glycol (MIRALAX) 17 gram/dose powder Take 17 g by mouth daily. Yes Historical Provider   MULTIVITAMIN PO Take  by mouth. Yes Historical Provider   risperiDONE (RISPERDAL) 2 mg tablet 1 mg four (4) times daily. Yes Historical Provider   cholecalciferol, VITAMIN D3, (VITAMIN D3) 5,000 unit tab tablet Take  by mouth daily. Yes Historical Provider   Desvenlafaxine SR (PRISTIQ) 50 mg tablet Take 50 mg by mouth. Yes Historical Provider   ALPRAZolam Jackalyn Pushpa) 1 mg tablet Take 1 mg by mouth three (3) times daily as needed for Anxiety. Yes Historical Provider   Cetirizine (ZYRTEC) 10 mg cap Take  by mouth daily as needed. Indications: ALLERGIC RHINITIS   Yes Historical Provider   QUEtiapine (SEROQUEL) 300 mg tablet Take 600 mg by mouth nightly. Indications: DELUSIONS   Yes Historical Provider       Hospital Medications:  No current facility-administered medications for this encounter. Current Outpatient Prescriptions   Medication Sig    esomeprazole (NEXIUM) 40 mg capsule Take 1 cap by mouth twice daily  Indications: gastroesophageal reflux disease    levothyroxine (SYNTHROID) 200 mcg tablet Take 1 Tab by mouth Daily (before breakfast).     ofloxacin (FLOXIN) 0.3 % otic solution Administer 5 Drops in left ear two (2) times a day.  Calcium-Cholecalciferol, D3, (CALCIUM 600 WITH VITAMIN D3) 600 mg(1,500mg) -400 unit cap Take  by mouth.  fludrocortisone (FLORINEF) 0.1 mg tablet Take 1 Tab by mouth daily.  acetaminophen (TYLENOL) 500 mg tablet Take 1 Tab by mouth every six (6) hours as needed for Pain.  polyethylene glycol (MIRALAX) 17 gram/dose powder Take 17 g by mouth daily.  MULTIVITAMIN PO Take  by mouth.  risperiDONE (RISPERDAL) 2 mg tablet 1 mg four (4) times daily.  cholecalciferol, VITAMIN D3, (VITAMIN D3) 5,000 unit tab tablet Take  by mouth daily.  Desvenlafaxine SR (PRISTIQ) 50 mg tablet Take 50 mg by mouth.  ALPRAZolam (XANAX) 1 mg tablet Take 1 mg by mouth three (3) times daily as needed for Anxiety.  Cetirizine (ZYRTEC) 10 mg cap Take  by mouth daily as needed. Indications: ALLERGIC RHINITIS    QUEtiapine (SEROQUEL) 300 mg tablet Take 600 mg by mouth nightly. Indications: DELUSIONS       Social History:  Social History   Substance Use Topics    Smoking status: Never Smoker    Smokeless tobacco: Never Used    Alcohol use No         Family History:  Family History   Problem Relation Age of Onset    Diabetes Father     Hypertension Father     Gout Father     Arthritis-osteo Father     Osteoporosis Mother     Arthritis-osteo Mother     Allergic Rhinitis Mother        Review of Systems:  A detailed 10 system ROS is obtained, with pertinent positives as listed above. All others are negative. Diet:  NPO    Objective:     Physical Exam:  Vitals:  Visit Vitals    /89    Pulse 95    Temp 97.9 °F (36.6 °C)    Resp 16    SpO2 96%     Gen:  Pt is alert, cooperative, no acute distress  Skin:  Extremities and face reveal no rashes. HEENT: Sclerae anicteric. Extra-occular muscles are intact. No oral ulcers. No abnormal pigmentation of the lips. The neck is supple. Hard of hearing. Cardiovascular: Regular rate and rhythm.  No murmurs, gallops, or rubs. Respiratory:  Comfortable breathing with no accessory muscle use. Clear breath sounds anteriorly with no wheezes, rales, or rhonchi. GI:  Abdomen nondistended, soft, TTP in RUQ and Epigastric region. No rebound/guarding. Normal active bowel sounds. No enlargement of the liver or spleen. No masses palpable. Rectal:  Deferred  Musculoskeletal:  No pitting edema of the lower legs. Neurological:  Gross memory appears intact. Patient is alert and oriented. Psychiatric:  Mood appears appropriate with judgement intact. Lymphatic:  No cervical or supraclavicular adenopathy. Laboratory:    Recent Labs      06/09/18   1324  06/09/18   1237   WBC   --   12.1*   HGB   --   11.4*   HCT   --   34.2*   PLT   --   508*   MCV   --   83.6   NA   --   137   K   --   5.5*   CL   --   100   CO2   --   24   BUN   --   10   CREA   --   0.76   CA   --   8.7   GLU   --   107*   AP   --   975*   SGOT   --   403*   ALT   --   364*   TBILI   --   10.5*   ALB   --   2.3*   TP   --   8.4*   LPSE   --   42*   PTP  14.8*   --    INR  1.2   --       GES 5/31/18:   NUCLEAR MEDICINE SOLID PHASE GASTRIC EMPTYING EXAM.     HISTORY:  Abnormal liver enzymes and fatty liver.     Radiopharmaceutical: 1 mCi Tc 99m sulfur colloid standard egg meal.     FINDINGS: Following ingestion of radiopharmaceutical imaging was performed out  to 4 hours following the [facility]) standard procedure.     Gastric retention is calculated at 72 % at 1 hour. Gastric retention is calculated at 35% at 4 hours.     Abnormal rapid emptying is less than 30% retention at one hour. Abnormal delayed emptying is greater than 10% retention 4 hours.     IMPRESSION  IMPRESSION: Moderately delayed gastric emptying.     Grade 1: Mild delayed emptying is less than 20% retention at 4 hours. Grade 2: Moderately emptying is 21-35% retention at 4 hours.   Grade 3: Severe delayed emptying is greater than 50% retention at 4 hours    --------------------------------------------------------------------------------------------------------------------------------  RUQ U/S 5/31/18:  FINDINGS: There is mildly heterogeneous and increased echogenicity throughout  the liver which can limit sensitivity for masses. There are no discrete lesions  in the visualized portions of the liver. Liver size is 18.5 cm, within normal  limits.     Main portal vein is patent on Doppler imaging.     There is no bile duct dilatation. The common bile duct diameter is 5 mm. The  gallbladder is partially contracted, and the patient reportedly had lunch today. No gallstones are seen. There is borderline nonspecific 3 mm gallbladder wall  thickening, likely due to contracted status. Sonographic Mccain's sign is not  seen.     There are no discrete lesions in the limited visualized portions of the  pancreas, mostly obscured by overlying bowel.      The right kidney measures 10.1 cm in length. Color Doppler demonstrates expected  right renal flow. There is no hydronephrosis. There is no evidence of a solid  renal mass.      There is no evidence of ascites.      The aorta and IVC are patent on Doppler imaging. Aortic diameter is within  normal limits.        IMPRESSION  IMPRESSION:   1. Mildly heterogeneous echogenic liver parenchyma is nonspecific, most often  steatosis. 2. No acute abnormality otherwise. Assessment:     Active Problems:    Jaundice (6/9/2018)    Elevated LFT    Patient is a 44 y.o. female with PMH of but not limited to GERD, anxiety, DM II, Prior Brain tumor with  shunt, parnoid schizophrenia, CVA,  Gastroparesis, who is seen in consultation at the request of Dr. Cece Aviles for Elevated LFT and Jaundice. She has had slowly rising LFT since admission on 5/31/18. RUQ U/S during prior admission with findings of fatty liver. Statins and Metformin have been stopped recently as possible causes of her LFT elevations.  Labs in the ED today with TB 10.5, , , , WBC 12.1, . Afebrile. Possible causes of her elevated LFT include biliary obstruction from stone or extrinsic compression and Medication induced hepatitis. Plan:   1. MRCP to evaluate the CBD  2. Follow LFT  3. Supportive care  4. Avoid Hepatotoxic medications  5. Check tylenol level. 6. Possible ERCP pending MRCP findings. 7. Clear liquid diet today    Thank you for this kind consultation. We will follow along with you. Dr. Dixon Simons to follow with further recommendations. Kira Barnes, VINICIO     ADDENDUM: Noted MRCP results below. Will start antibiotics and plan for ERCP on Monday with Dr. Xenia Gilbert. Dr. Dixon Simons to discuss results of MRCP with patient and mother. Will start clears. IMPRESSION  IMPRESSION:  1. Findings highly suspicious for diffuse hepatic metastatic disease versus  multifocal cholangiocarcinoma. Image quality somewhat degraded by motion  artifact. Percutaneous tissue sampling may be helpful in further evaluation. Evidence of central biliary obstruction by tumor. 2.  Question small choledocholithiasis in the inferior common bile duct.

## 2018-06-09 NOTE — IP AVS SNAPSHOT
Summary of Care Report The Summary of Care report has been created to help improve care coordination. Users with access to [employer] or [place] [facility] (Web-based application) may access additional patient information including the Discharge Summary. If you are not currently a [address] user and need more information, please call the number listed below in the [place] [address] section and ask to be connected with Medical Records. Facility Information Name Address Phone [address] [address]  [phone] Patient Information Patient Name Sex LEONCIO Rader ([id number]) Female 1980 Discharge Information Admitting Provider Service Area Unit Leyla Baxter MD / [address] / [phone] Discharge Provider Discharge Date/Time Discharge Disposition Destination (none) (none) (none) (none) Patient Language Language ENGLISH [13] Hospital Problems as of 6/10/2018  Reviewed: 2018 10:48 AM by Rachele Emerson DO Class Noted - Resolved Last Modified POA Active Problems Hypothyroidism (Chronic)  10/13/2014 - Present 2018 by Leyla Baxter MD Yes Entered by Kristin Pisano Depression (Chronic)  10/13/2014 - Present 2018 by Leyla Baxter MD Yes Entered by Kristin Pisano GERD (gastroesophageal reflux disease) (Chronic)  10/13/2014 - Present 2018 by Leyla Baxter MD Yes Entered by Kristin Pisano Hyperlipidemia  2016 - Present 2018 by Leyla Baxter MD Yes Entered by Olga Hart MD  
  Cognitive developmental delay (Chronic)  2018 - Present 2018 by Leyla Baxter MD Yes Entered by Sheyla Rene MD  
  Gastroparesis  2018 - Present 2018 by Leyla Baxter MD Yes   Entered by Sheyla Rene MD  
 Jaundice  6/9/2018 - Present 6/9/2018 by Masood Joy MD Unknown Entered by Masood Joy MD  
  
Non-Hospital Problems as of 6/10/2018  Reviewed: 5/22/2018 10:48 AM by Samir Cameronr, DO Class Noted - Resolved Last Modified Active Problems TIA (transient ischemic attack)  10/13/2014 - Present 10/15/2014 Entered by Oscar Chacon Chronic sinusitis  9/22/2016 - Present 9/22/2016 by Liam Cabral Entered by Liam Cabral Intellectual disability  Unknown - Present 10/25/2016 by Zoey Najera MD  
  Entered by Zoey Najera MD  
  Tachycardia (Chronic)  11/8/2016 - Present 11/8/2016 by Abraham Rhoades MD  
  Entered by Abraham Rhoades MD  
  Paranoid schizophrenia [facility])  Unknown - Present 6/9/2018 by Masood Joy MD  
  Entered by Evelin Christina OCD (obsessive compulsive disorder)  Unknown - Present 5/31/2018 by Paula Duong MD  
  Entered by Evelin Repress Secondary hypothyroidism  3/2/2017 - Present 3/2/2017 by Zoey Najera MD  
  Entered by Zoey Najera MD  
  Vasovagal syncope  3/2/2017 - Present 3/2/2017 by Zoey Najera MD  
  Entered by Zoey Najera MD  
   (ventriculoperitoneal) shunt status  Unknown - Present 3/21/2017 by Zoey Najera MD  
  Entered by Zoey Najera MD  
  Overview Signed 3/21/2017  3:57 PM by Zoey Najera MD  
   not functioning based on 2016 shunt xray series Esophagitis  5/4/2017 - Present 6/1/2018 by Paula Duong MD  
  Entered by Zoey Najera MD  
  Overview Signed 5/5/2017  2:26 PM by Zoey Najera MD  
   3-5104 EGD showed healing esophagitis improved compared to prior EGD - plan continue omprezole Organic psychosis  Unknown - Present 7/6/2017 by Zoey Najera MD  
  Entered by Zoey Najera MD  
  Overview Signed 7/6/2017  7:31 AM by Zoey Najera MD  
   Sees Dr. Seamus Novoa Psychiatry   Obesity  11/15/2017 - Present 11/15/2017 by Zoey Najera MD  
 Entered by Francisco J Linder MD  
  Elevated blood sugar  11/15/2017 - Present 11/15/2017 by Francisco J Linder MD  
  Entered by Francisco J Linder MD  
  Elevated liver function tests  5/31/2018 - Present 5/31/2018 by Cristobal Pretty MD  
  Entered by Cristobal Pretty MD  
  Anemia (Chronic)  5/31/2018 - Present 5/31/2018 by Cristobal Pretty MD  
  Entered by Cristobal Pretty MD  
  Elevated blood pressure reading  5/31/2018 - Present 5/31/2018 by Cristobal Pretty MD  
  Entered by Cristobal Pretty MD  
  Hypomagnesemia  5/31/2018 - Present 5/31/2018 by Cristobal Pretty MD  
  Entered by Cristobal Pretty MD  
  Hyperglycemia (Chronic)  5/31/2018 - Present 5/31/2018 by Cristobal Pretty MD  
  Entered by Cristobal Pretty MD  
  Cholelithiasis  6/1/2018 - Present 6/1/2018 by Cristobal Pretty MD  
  Entered by Cristobal Pretty MD  
  Gastric erosion  6/1/2018 - Present 6/1/2018 by Cristobal Pretty MD  
  Entered by Cristobal Pretty MD  
  
You are allergic to the following Allergen Reactions Geodon (Ziprasidone Hcl) Rash Current Discharge Medication List  
  
ASK your doctor about these medications Dose & Instructions Dispensing Information Comments  
 acetaminophen 500 mg tablet Commonly known as:  TYLENOL Dose:  500 mg Take 1 Tab by mouth every six (6) hours as needed for Pain. Refills:  0 ALPRAZolam 1 mg tablet Commonly known as:  Rosalene Alex Dose:  1 mg Take 1 mg by mouth three (3) times daily as needed for Anxiety. Refills:  0  
   
 CALCIUM 600 WITH VITAMIN D3 600 mg(1,500mg) -400 unit Cap Generic drug:  Calcium-Cholecalciferol (D3) Take  by mouth. Refills:  0  
   
 cholecalciferol (VITAMIN D3) 5,000 unit Tab tablet Commonly known as:  VITAMIN D3 Take  by mouth daily. Refills:  0  
   
 esomeprazole 40 mg capsule Commonly known as:  NexIUM Take 1 cap by mouth twice daily  Indications: gastroesophageal reflux disease Quantity:  60 Cap Refills:  0  
   
 fludrocortisone 0.1 mg tablet Commonly known as:  FLORINEF Dose:  0.1 mg Take 1 Tab by mouth daily. Quantity:  90 Tab Refills:  3  
   
 levothyroxine 200 mcg tablet Commonly known as:  SYNTHROID Dose:  200 mcg Take 1 Tab by mouth Daily (before breakfast). Quantity:  30 Tab Refills:  0  
   
 metFORMIN 500 mg tablet Commonly known as:  GLUCOPHAGE Take  by mouth two (2) times daily (with meals). Refills:  0 MIRALAX 17 gram/dose powder Generic drug:  polyethylene glycol Dose:  17 g Take 17 g by mouth daily. Refills:  0 MULTIVITAMIN PO Take  by mouth. Refills:  0  
   
 ofloxacin 0.3 % otic solution Commonly known as:  FLOXIN Dose:  5 Drop Administer 5 Drops in left ear two (2) times a day. Quantity:  5 mL Refills:  6 PRISTIQ 50 mg ER tablet Generic drug:  desvenlafaxine succinate Dose:  50 mg Take 50 mg by mouth. Refills:  0  
   
 risperiDONE 2 mg tablet Commonly known as:  RisperDAL Dose:  1 mg  
1 mg four (4) times daily. Refills:  0 SEROquel 300 mg tablet Generic drug:  QUEtiapine Dose:  600 mg Take 600 mg by mouth nightly. Indications: DELUSIONS Refills:  0 ZyrTEC 10 mg Cap Generic drug:  Cetirizine Take  by mouth daily as needed. Indications: ALLERGIC RHINITIS Refills:  0 Follow-up Information None Discharge Instructions None Chart Review Routing History Recipient Method Report [facility] An[facility] Seip, MD  
Fax: [phone] Phone: [phone] Fax IP Auto Routed [employer] [7138] 5/8/2014  8:32 AM 05/08/2014 Aniceto Seip, MD  
Fax: [phone] Phone: [phone] Fax IP Auto Routed [employer] [0516] 10/14/2014  7:50 AM 10/14/2014

## 2018-06-09 NOTE — ED TRIAGE NOTES
Pts family states she has been getting more jaundice since Thursday. Pt was here for  GI bleed recently.  Pt denies any pain, denies abd pain

## 2018-06-09 NOTE — PROGRESS NOTES
Dual skin assessment performed by this rn and jesusita,rn. Skin warm,dry,intact with no noted breakdown.

## 2018-06-09 NOTE — IP AVS SNAPSHOT
[address] 322 [facility] 
[phone] Patient: Girish Chase MRN: [id number] :[id number] About your hospitalization You were admitted on:  2018 You last received care in the:  [address] You were discharged on:  Manuela 15, 2018 Why you were hospitalized Your primary diagnosis was:  Metastatic Colon Cancer In Female (Hcc) Your diagnoses also included:  Obstructive Jaundice Due To Cancer (Hcc), Gerd (Gastroesophageal Reflux Disease), Hypothyroidism, Depression, Cognitive Developmental Delay, Hyperlipidemia, Gastroparesis, Elevated Cea, Cholestatic Hepatitis, Fever, Paranoid Schizophrenia (Hcc), Intellectual Disability,  (Ventriculoperitoneal) Shunt Status, Iron Deficiency Anemia Due To Chronic Blood Loss Follow-up Information Follow up With Details Comments Contact Info Glen Ortega MD On 2018 APPT TIME PER MAGUI, 1245 AND PALLIATIVE SOON AFTER, [address] [facility] [address] [facility] [address] 
[phone] Palliative Care Go to see at same time as oncology Isael Conway MD   [address] [facility] [address] 
[phone] Your Scheduled Appointments 2018 12:30 PM EDT New Patient with AI Chakraborty 52 [facility] Hematology and Oncology [facility]) C/ Eze Peterson 33 [facility] [address]  
[phone] 2018 12:45 PM EDT New Patient with Glen Ortega MD  
[facility] Hematology and Oncology [facility]) MARY/ Eze Peterson 33 [facility] [address]  
[phone] Discharge Orders None A check tessa indicates which time of day the medication should be taken. My Medications CHANGE how you take these medications Instructions Each Dose to Equal  
 Morning Noon Evening Bedtime polyethylene glycol 17 gram/dose powder Commonly known as:  Maphilip Osorio What changed:  when to take this Take 17 g by mouth two (2) times a day. 17 g CONTINUE taking these medications Instructions Each Dose to Equal  
 Morning Noon Evening Bedtime  
 acetaminophen 500 mg tablet Commonly known as:  TYLENOL Your next dose is: Take on as needed schedule Take 1 Tab by mouth every six (6) hours as needed for Pain. 500 mg  
    
   
   
   
  
 ALPRAZolam 1 mg tablet Commonly known as:  Phoebe Ronaldo Your next dose is: Take on as needed schedule Take 1 mg by mouth three (3) times daily as needed for Anxiety. 1 mg CALCIUM 600 WITH VITAMIN D3 600 mg(1,500mg) -400 unit Cap Generic drug:  Calcium-Cholecalciferol (D3) Your next dose is: Take as directed Take  by mouth. cholecalciferol (VITAMIN D3) 5,000 unit Tab tablet Commonly known as:  VITAMIN D3 Take  by mouth daily. esomeprazole 40 mg capsule Commonly known as:  NexIUM Your next dose is: Take as directed Take 1 cap by mouth twice daily  Indications: gastroesophageal reflux disease  
     
   
   
   
  
 fludrocortisone 0.1 mg tablet Commonly known as:  FLORINEF Take 1 Tab by mouth daily. 0.1 mg  
    
  
   
   
   
  
 levothyroxine 200 mcg tablet Commonly known as:  SYNTHROID Take 1 Tab by mouth Daily (before breakfast). 200 mcg MULTIVITAMIN PO Your next dose is: Take as directed Take  by mouth. ofloxacin 0.3 % otic solution Commonly known as:  FLOXIN Administer 5 Drops in left ear two (2) times a day. 5 Drop PRISTIQ 50 mg ER tablet Generic drug:  desvenlafaxine succinate Your next dose is: Take as directed Take 50 mg by mouth.   
 50 mg  
    
   
   
   
  
 risperiDONE 2 mg tablet Commonly known as:  RisperDAL  
   
 1 mg four (4) times daily. 1 mg SEROquel 300 mg tablet Generic drug:  QUEtiapine Take 600 mg by mouth nightly. Indications: DELUSIONS  
 600 mg  
    
   
   
   
  
  
 ZyrTEC 10 mg Cap Generic drug:  Cetirizine Your next dose is: Take on as needed schedule Take  by mouth daily as needed. Indications: ALLERGIC RHINITIS  
     
   
   
   
  
  
ASK your doctor about these medications Instructions Each Dose to Equal  
 Morning Noon Evening Bedtime  
 metFORMIN 500 mg tablet Commonly known as:  GLUCOPHAGE Your next dose is: Take as directed Take  by mouth two (2) times daily (with meals). Where to Get Your Medications These medications were sent to [facility], [place] [address]  [address], [facility] [address] Phone:  [phone]  
  polyethylene glycol 17 gram/dose powder Discharge Instructions Abdominal Pain: Care Instructions Your Care Instructions Abdominal pain has many possible causes. Some aren't serious and get better on their own in a few days. Others need more testing and treatment. If your pain continues or gets worse, you need to be rechecked and may need more tests to find out what is wrong. You may need surgery to correct the problem. Don't ignore new symptoms, such as fever, nausea and vomiting, urination problems, pain that gets worse, and dizziness. These may be signs of a more serious problem. Your doctor may have recommended a follow-up visit in the next 8 to 12 hours. If you are not getting better, you may need more tests or treatment. The doctor has checked you carefully, but problems can develop later. If you notice any problems or new symptoms, get medical treatment right away. Follow-up care is a key part of your treatment and safety. Be sure to make and go to all appointments, and call your doctor if you are having problems. It's also a good idea to know your test results and keep a list of the medicines you take. How can you care for yourself at home? · Rest until you feel better. · To prevent dehydration, drink plenty of fluids, enough so that your urine is light yellow or clear like water. Choose water and other caffeine-free clear liquids until you feel better. If you have kidney, heart, or liver disease and have to limit fluids, talk with your doctor before you increase the amount of fluids you drink. · If your stomach is upset, eat mild foods, such as rice, dry toast or crackers, bananas, and applesauce. Try eating several small meals instead of two or three large ones. · Wait until 48 hours after all symptoms have gone away before you have spicy foods, alcohol, and drinks that contain caffeine. · Do not eat foods that are high in fat. · Avoid anti-inflammatory medicines such as aspirin, ibuprofen (Advil, Motrin), and naproxen (Aleve). These can cause stomach upset. Talk to your doctor if you take daily aspirin for another health problem. When should you call for help? Call 911 anytime you think you may need emergency care. For example, call if: 
? · You passed out (lost consciousness). ? · You pass maroon or very bloody stools. ? · You vomit blood or what looks like coffee grounds. ? · You have new, severe belly pain. ?Call your doctor now or seek immediate medical care if: 
? · Your pain gets worse, especially if it becomes focused in one area of your belly. ? · You have a new or higher fever. ? · Your stools are black and look like tar, or they have streaks of blood. ? · You have unexpected vaginal bleeding. ? · You have symptoms of a urinary tract infection. These may include: 
¨ Pain when you urinate.  
¨ Urinating more often than usual. 
 ¨ Blood in your urine. ? · You are dizzy or lightheaded, or you feel like you may faint. ? Watch closely for changes in your health, and be sure to contact your doctor if: 
? · You are not getting better after 1 day (24 hours). Where can you learn more? Go to http://jeni-hemalatha.info/. Enter A238 in the search box to learn more about \"Abdominal Pain: Care Instructions. \" Current as of: March 20, 2017 Content Version: 11.4 © 5865-5950 [employer]. Care instructions adapted under license by [employer] (which disclaims liability or warranty for this information). If you have questions about a medical condition or this instruction, always ask your healthcare professional. [address] any warranty or liability for your use of this information. Managing Side Effects of Chemotherapy: Care Instructions Your Care Instructions Cancer is often treated with medicines that destroy the cancer cells (chemotherapy). These medicines may slow cancer growth and prevent or stop the spread of cancer. Chemotherapy also can affect healthy cells and cause side effects. Most people can work and do their normal activities after and even during chemotherapy, but they may need to limit their schedules. Side effects of chemotherapy may include nausea and vomiting, loss of appetite, pain, and being tired. Some medicines can cause diarrhea or mouth sores. Your doctor may prescribe medicines to treat the side effects. Your doctor will advise you to take extra care to prevent illnesses and infections, because chemotherapy weakens your natural defenses. Follow-up care is a key part of your treatment and safety. Be sure to make and go to all appointments, and call your doctor if you are having problems. It's also a good idea to know your test results and keep a list of the medicines you take. How can you care for yourself at home? Medicines ? · Take your medicines exactly as prescribed. Call your doctor if you think you are having a problem with your medicine. You may get medicine for nausea and vomiting if you have these side effects. ?Nausea and vomiting ? · A light meal or snack before chemotherapy may help prevent nausea. If you do have nausea during your treatment, try eating earlier-at least an hour or two before your next treatment. After your treatment, you may want to wait one or more hours before you eat again. ? · Drink fluids with your meals and an hour before or after meals. ? · After vomiting has stopped for 1 hour, sip a rehydration drink, such as Powerade or Gatorade. ? · Drink plenty of fluids to prevent dehydration. Choose water and other caffeine-free clear liquids until you feel better. Try clear fluids, such as apple or grape juice mixed to half strength with water, rehydration drinks, weak tea with sugar, clear broth, and gelatin dessert. Do not drink citrus juices. If you have kidney, heart, or liver disease and have to limit fluids, talk with your doctor before you increase the amount of fluids you drink. ? · When you are feeling better, begin eating clear soups and mild foods until all symptoms are gone for 12 to 48 hours. Other good choices include dry toast, crackers, cooked cereal, and gelatin dessert, such as Jell-O.  
? · If your vomiting is not getting better or is getting worse, call your doctor right away. ? Loss of appetite ? · It's important to eat healthy food. If you do not feel like eating, try to eat food that has protein and extra calories to keep up your strength and prevent weight loss. You can drink liquid meal replacements for extra calories and protein. ? · Try eating several smaller meals throughout the day. Set a schedule for meals and snacks, and plan for times when it feels best to eat. Try to eat your main meal early. ? · After treatment, you may want to wait for a while to eat. You can also try eating earlier before treatment. ? · Try to eat more of the foods you like during the days and times when your appetite is good. ? · When you don't feel like eating your normal foods, try clear broths/soups and mild foods like toast, crackers, cooked cereal like oatmeal, and gelatin dessert. Eating soft, bland foods may help. ?Pain control ? · If your doctor prescribes medicines to control pain, take them as directed. Often your doctor will have you take these medicines regularly to keep your pain under control. Medicine for pain may cause side effects. Let your doctor know if you feel constipated, have trouble urinating, or have nausea. ? · Try using relaxation exercises to lower your anxiety and stress, which can increase pain. ? · Keep track of your pain so you can tell your doctor what your pain is like. Write down where you feel pain, how long it lasts, what seems to bring it on, and how it feels. Also note what makes the pain feel better or worse. ? · If you have mouth pain, your doctor may prescribe a special mouth rinse that can help relieve the pain. ?Weakness and feeling tired ? · Get extra rest. Plan ahead so you can take breaks or naps. ? · Save your energy for the most important things you want to do. ? · Try to get some exercise, such as walking, but stop if you are too tired. ? · Eat a balanced diet. Do not skip meals, especially breakfast.  
? · Do something you enjoy. Do you like to listen to music? Spend some time listening to your favorite music. Or find another way to relax by reading, watching a movie, or playing games. ? · Ask family and friends to help with home chores and other tasks. ? To prevent infections ? · Wash your hands often during the day, especially before you eat and after you use the bathroom.   
? · Stay away from people who have illnesses that you might catch, such as the flu or a cold. ? · Try to stay out of crowds. ? · Clean cuts and scrapes right away with warm water and soap. Clean them daily until they are healed. ? · Keep track of your temperature, if your doctor recommends it. You can do this by taking your temperature at regular times and writing it down. ? Hair loss ? · Use a mild shampoo and a soft hair brush. ? · Use a low setting on your hair dryer. Do not color or perm your hair. ? · Have your hair cut short. It will look thicker and hawkins, and it will not be such a shock if you lose hair. ? · Use sunscreen and a hat, scarf, or turban to protect your scalp from the sun. ? · Ask your doctor about other treatments that you may try to prevent or minimize hair loss. These may include the use of a cooling cap. When should you call for help? Call 911 anytime you think you may need emergency care. For example, call if: 
? · You passed out (lost consciousness). ?Call your doctor now or seek immediate medical care if: 
? · You have a fever. ? · You have abnormal bleeding. ? · You have new or worse pain. ? · You think you have an infection. ? · You have new symptoms, such as a cough, belly pain, vomiting, diarrhea, or a rash. ? Watch closely for changes in your health, and be sure to contact your doctor if: 
? · You are much more tired than usual.  
? · You have swollen glands in your armpits, groin, or neck. ? · You do not get better as expected. Where can you learn more? Go to http://jeni-ehmalatha.info/. Enter (252) [phone] in the search box to learn more about \"Managing Side Effects of Chemotherapy: Care Instructions. \" Current as of: May 12, 2017 Content Version: 11.4 © 7989-2730 [employer] Care instructions adapted under license by [employer] (which disclaims liability or warranty for this information).  If you have questions about a medical condition or this instruction, always ask your healthcare professional. [place] [address] any warranty or liability for your use of this information. DISCHARGE SUMMARY from Nurse PATIENT INSTRUCTIONS: 
 
 
F-face looks uneven A-arms unable to move or move unevenly S-speech slurred or non-existent T-time-call 911 as soon as signs and symptoms begin-DO NOT go Back to bed or wait to see if you get better-TIME IS BRAIN. Warning Signs of HEART ATTACK Call 911 if you have these symptoms: 
? Chest discomfort. Most heart attacks involve discomfort in the center of the chest that lasts more than a few minutes, or that goes away and comes back. It can feel like uncomfortable pressure, squeezing, fullness, or pain. ? Discomfort in other areas of the upper body. Symptoms can include pain or discomfort in one or both arms, the back, neck, jaw, or stomach. ? Shortness of breath with or without chest discomfort. ? Other signs may include breaking out in a cold sweat, nausea, or lightheadedness. Don't wait more than five minutes to call [address]! Fast action can save your life. Calling 911 is almost always the fastest way to get lifesaving treatment. Emergency Medical Services staff can begin treatment when they arrive  up to an hour sooner than if someone gets to the hospital by car. The discharge information has been reviewed with the patient. The patient verbalized understanding. Discharge medications reviewed with the patient and appropriate educational materials and side effects teaching were provided.  
___________________________________________________________________________ ________________________________________________________ web care [email or web address] Announcement We are excited to announce that we are making your provider's discharge notes available to you in [employer]. You will see these notes when they are completed and signed by the physician that discharged you from your recent hospital stay. If you have any questions or concerns about any information you see in [employer], please call the Health Information Department where you were seen or reach out to your Primary Care Provider for more information about your plan of care. Introducing [facility]! Erin Shell introduces [employer] patient portal. Now you can access parts of your medical record, email your doctor's office, and request medication refills online. 1. In your internet browser, go to [email or web address] 2. Click on the First Time User? Click Here link in the Sign In box. You will see the New Member Sign Up page. 3. Enter your [employer] Access Code exactly as it appears below. You will not need to use this code after youve completed the sign-up process. If you do not sign up before the expiration date, you must request a new code. · [employer] Access Code: [email or web address] Expires: 6/28/2018 10:44 AM 
 
4. Enter the last four digits of your Social Security Number (xxxx) and Date of Birth (mm/dd/yyyy) as indicated and click Submit. You will be taken to the next sign-up page. 5. Create a [employer] ID. This will be your [employer] login ID and cannot be changed, so think of one that is secure and easy to remember. 6. Create a [employer] password. You can change your password at any time. 7. Enter your Password Reset Question and Answer. This can be used at a later time if you forget your password. 8. Enter your e-mail address. You will receive e-mail notification when new information is available in [address] 9. Click Sign Up. You can now view and download portions of your medical record. 10. Click the Download Summary menu link to download a portable copy of your medical information. If you have questions, please visit the Frequently Asked Questions section of the Boost[employer]t website. Remember, kwiry is NOT to be used for urgent needs. For medical emergencies, dial 911. Now available from your iPhone and Android! Introducing Higinio Aguilar As a [facility] patient, I wanted to make you aware of our electronic visit tool called Higinio Aguilar. Inductly 24/7 allows you to connect within minutes with a medical provider 24 hours a day, seven days a week via a mobile device or tablet or logging into a secure website from your computer. You can access Higinio Photoson[employer] Medicalaquilinofin from anywhere in the [place]. A virtual visit might be right for you when you have a simple condition and feel like you just dont want to get out of bed, or cant get away from work for an appointment, when your regular [facility] provider is not available (evenings, weekends or holidays), or when youre out of town and need minor care. Electronic visits cost only $49 and if the Myoonet/StorSimple provider determines a prescription is needed to treat your condition, one can be electronically transmitted to a nearby pharmacy*. Please take a moment to enroll today if you have not already done so. The enrollment process is free and takes just a few minutes. To enroll, please download the Myoonet/StorSimple stephanie to your tablet or phone, or visit [email or web address] to enroll on your computer. And, as an [address] [place] [address] patient with a SPO account, the results of your visits will be scanned into your electronic medical record and your primary care provider will be able to view the scanned results. We urge you to continue to see your regular [facility] provider for your ongoing medical care.   And while your primary care provider may not be the one available when you seek a Higinio Aguilar virtual visit, the peace of mind you get from getting a real diagnosis real time can be priceless. For more information on Higinio Callowayaz, view our Frequently Asked Questions (FAQs) at [email or web address]. Sincerely, 
 
William Hannah MD 
Chief Medical Officer Zach Rodriguez *:  certain medications cannot be prescribed via Higinio Aguilar Unresulted tests-please follow up with your PCP on these results Procedure/Test Authorizing Provider Madi Mclean MD  
 CANCER AG 19-9 Leonila Samano MD  
 CBC W/O DIFF Arie Hartman MD  
 CBC WITH AUTOMATED DIFF Channie Dykes, DO  
 CBC WITH AUTOMATED DIFF Amelia Cervantes MD  
 CBC WITH AUTOMATED DIFF Amelia Cervantes MD  
 CBC WITH AUTOMATED DIFF Jil Elam MD  
 CEA Leonila Samano MD  
 CT CHEST ABD PELV W CONT [facility], NP  
 CULTURE, BLOOD Donovan Chance MD  
 CULTURE, URINE Sergey Kan Pettit MD  
 HEPATIC FUNCTION PANEL Leonila Samano MD  
 HGB & HCT Arie Hartman MD  
 [address] [place] [address], MD  
 MAGNESIUM Arie Hartman MD  
 METABOLIC PANEL, BASIC Arie Hartman MD  
 METABOLIC PANEL, BASIC Arie Hartman MD  
 METABOLIC PANEL, BASIC Amelia Cervantes MD  
 METABOLIC PANEL, COMPREHENSIVE Chyrl Console, NP  
 METABOLIC PANEL, COMPREHENSIVE Jil Elam MD  
 MRI ABD W WO CONT Jil Elam MD  
 PROTHROMBIN TIME + INR Leonila Samano MD  
 PROTHROMBIN TIME + INR Jil Elam MD  
 URINALYSIS W/ [facility] MICROSCOPIC Donovan Chance MD  
 XR ERCP / Sherley Wilson MD  
 XR FLUOROSCOPY UNDER 60 Dianne Antunez MD  
  
Providers Seen During Your Hospitalization Provider Specialty Primary office phone Flex Sweet MD Emergency Medicine [phone] Charles French MD Internal Medicine [phone] Your Primary Care Physician (PCP) Primary Care Physician Office Phone Office Fax Mariana Yeung [phone] You are allergic to the following Allergen Reactions Geodon (Ziprasidone Hcl) Rash Recent Documentation Weight Breastfeeding? BMI OB Status Smoking Status 72.9 kg No 30.38 kg/m2 Unknown Never Smoker Emergency Contacts Name Discharge Info Relation Home Work Mobile Bernardo Mota DISCHARGE CAREGIVER [3] Father [15] [phone] Kaitlynn Mota DISCHARGE CAREGIVER [3] Mother [14] [phone] Patient Belongings The following personal items are in your possession at time of discharge: 
  Dental Appliances: None  Visual Aid: None   Hearing Aids/Status: At bedside  Home Medications: Sent to pharmacy   Jewelry: None  Clothing: None    Other Valuables: None Discharge Instructions Attachments/References LOW-FIBER DIET (ENGLISH) Patient Handouts Low-Fiber Diet: Care Instructions Your Care Instructions When your bowels are irritated, you may need to limit fiber in your diet until the problem clears up. Your doctor and dietitian can help you design a low-fiber diet based on your health and what you prefer to eat. Ask your doctor how long you should stay on a low-fiber diet. Your doctor probably will have you start adding more fiber to your diet as you get better. Always talk with your doctor or dietitian before you make changes in your diet. Follow-up care is a key part of your treatment and safety. Be sure to make and go to all appointments, and call your doctor if you are having problems. It's also a good idea to know your test results and keep a list of the medicines you take. How can you care for yourself at home? · Choose white or refined grains, and avoid whole grains.  That means eating white or refined cereals, breads, crackers, rice, or pasta. · Peel the skin from fruits and vegetables before you eat or cook them. Avoid eating skins, seeds, and hulls. ¨ Eat frozen or canned fruit. Low-fiber fruits include applesauce, ripe bananas, and fruit juice without pulp. ¨ Eat low-fiber vegetables, which include well-cooked vegetables and vegetable juice. · Drink or eat milk, yogurt, or other milk products, if you can digest dairy without too many problems. Your doctor may limit milk and milk products for a while. If so, he or she may recommend a calcium and vitamin D supplement. · Eat well-cooked meat, such as chicken, turkey, fish, or lean meat. You also can eat eggs and tofu. · Avoid these foods: 
¨ Bran, brown or wild rice, oatmeal, granola, corn, todd crackers, barley, and whole wheat and other whole-grain breads, such as rye bread ¨ Cereals with more than 3 grams of fiber a serving ¨ Berries, rhubarb, prunes, prune juice, and all dried fruits ¨ Raw vegetables ¨ Cabbage, broccoli, brussels sprouts, and cauliflower ¨ Cooked dried beans, lentils, and split peas ¨ Crunchy peanut butter ¨ Ice cream with fruit pieces in it ¨ Coconut, nuts, popcorn, raisins, and seeds, or any ice cream, yogurt, or cheese with these in them Where can you learn more? Go to http://jeni-hemalatha.info/. Enter E470 in the search box to learn more about \"Low-Fiber Diet: Care Instructions. \" Current as of: May 12, 2017 Content Version: 11.4 © 8655-5321 [employer]. Care instructions adapted under license by [employer] (which disclaims liability or warranty for this information). If you have questions about a medical condition or this instruction, always ask your healthcare professional. [address] any warranty or liability for your use of this information. Please provide this summary of care documentation to your next provider. Signatures-by signing, you are acknowledging that this After Visit Summary has been reviewed with you and you have received a copy. Patient Signature:  ____________________________________________________________ Date:  ____________________________________________________________  
  
Annetta Higinio Provider Signature:  ____________________________________________________________ Date:  ____________________________________________________________

## 2018-06-09 NOTE — IP AVS SNAPSHOT
[address] 
[phone] Patient: Tami Burgos MRN: [id number] :1980 A check tessa indicates which time of day the medication should be taken. My Medications ASK your doctor about these medications Instructions Each Dose to Equal  
 Morning Noon Evening Bedtime  
 acetaminophen 500 mg tablet Commonly known as:  TYLENOL Your last dose was: Your next dose is: Take 1 Tab by mouth every six (6) hours as needed for Pain. 500 mg  
    
   
   
   
  
 ALPRAZolam 1 mg tablet Commonly known as:  Darus Edu Your last dose was: Your next dose is: Take 1 mg by mouth three (3) times daily as needed for Anxiety. 1 mg CALCIUM 600 WITH VITAMIN D3 600 mg(1,500mg) -400 unit Cap Generic drug:  Calcium-Cholecalciferol (D3) Your last dose was: Your next dose is: Take  by mouth. cholecalciferol (VITAMIN D3) 5,000 unit Tab tablet Commonly known as:  VITAMIN D3 Your last dose was: Your next dose is: Take  by mouth daily. esomeprazole 40 mg capsule Commonly known as:  NexIUM Your last dose was: Your next dose is: Take 1 cap by mouth twice daily  Indications: gastroesophageal reflux disease  
     
   
   
   
  
 fludrocortisone 0.1 mg tablet Commonly known as:  FLORINEF Your last dose was: Your next dose is: Take 1 Tab by mouth daily. 0.1 mg  
    
   
   
   
  
 levothyroxine 200 mcg tablet Commonly known as:  SYNTHROID Your last dose was: Your next dose is: Take 1 Tab by mouth Daily (before breakfast). 200 mcg  
    
   
   
   
  
 metFORMIN 500 mg tablet Commonly known as:  GLUCOPHAGE Your last dose was: Your next dose is: Take  by mouth two (2) times daily (with meals). MIRALAX 17 gram/dose powder Generic drug:  polyethylene glycol Your last dose was: Your next dose is: Take 17 g by mouth daily. 17 g MULTIVITAMIN PO Your last dose was: Your next dose is: Take  by mouth. ofloxacin 0.3 % otic solution Commonly known as:  FLOXIN Your last dose was: Your next dose is:    
   
   
 Administer 5 Drops in left ear two (2) times a day. 5 Drop PRISTIQ 50 mg ER tablet Generic drug:  desvenlafaxine succinate Your last dose was: Your next dose is: Take 50 mg by mouth. 50 mg  
    
   
   
   
  
 risperiDONE 2 mg tablet Commonly known as:  RisperDAL Your last dose was: Your next dose is:    
   
   
 1 mg four (4) times daily. 1 mg SEROquel 300 mg tablet Generic drug:  QUEtiapine Your last dose was: Your next dose is: Take 600 mg by mouth nightly. Indications: DELUSIONS  
 600 mg  
    
   
   
   
  
 ZyrTEC 10 mg Cap Generic drug:  Cetirizine Your last dose was: Your next dose is: Take  by mouth daily as needed. Indications: ALLERGIC RHINITIS

## 2018-06-09 NOTE — H&P
Hospitalist H&P Note     Admit Date:  2018 12:25 PM   Name:  Tami Burgos   Age:  44 y.o.  :  1980   MRN:  [id number]   PCP:  Clare Monet MD  Treatment Team: Attending Provider: Roberth Tidwell MD    HPI:      is a 45 yo female who presented with jaundice on a background of GERD, anxiety, DM II, Prior Brain tumor with  shunt, parnoid schizophrenia, CVA, and  Gastroparesis. She is a limited historian due to mental disability though oriented to person, place and time but her mother was present at bedside and provided additional history. Her mother reported that the patient had worsening jaundice since Tuesday. Her mentation is at baseline. The patient denied any BRBPR or melena. She denies any fever or chills. Her mother reports that she takes 500mg of tylenol 4 times/week, denies any NSAID use. She also notes dark urine. Of note, she was recently admitted in 2018 with transaminitis with EGD showing esophagitis and gastric erosion. US abdomen showed fatty liver and gastric emptying study was positive for delayed emptying. She was also transfused pRBC at that time with plans for HIDA scan outpatient. 10 systems reviewed and negative except as noted in HPI.   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 Navarrete [facility] Psychiat[facility]     Paranoid type delusional disorder ([facility] [address]     from chemo as child- had brain tumor 1984    Stroke [facility]) 10-12-14    TIA; taking aspirin daily; pt released from neurologist care per mother    Tachycardia 11/8/2016    TIA (transient ischemic attack) 10/13/2014    Vasovagal syncope 3/2/2017     (ventriculoperitoneal) shunt status     not functioning based on 2016 shunt xray series      Past Surgical History:   Procedure Laterality Date    HX CRANIOTOMY  1984    HX CRANIOTOMY  2007    benign brain tumor    HX CSF SHUNT  1984    HX HEENT      tubes in ears    HX MYRINGOTOMY Bilateral     HX OTHER SURGICAL  1984    port placement for chemo    HX OTHER SURGICAL Left      SPHENOID BALLOON SINUPLASTY     HX OTHER SURGICAL      MENINGIOMA REMOVE     HX OTHER SURGICAL       SHUNT, CHEMO CATH      Allergies   Allergen Reactions    Geodon [Ziprasidone Hcl] Rash      Social History   Substance Use Topics    Smoking status: Never Smoker    Smokeless tobacco: Never Used    Alcohol use No      Family History   Problem Relation Age of Onset    Diabetes Father     Hypertension Father     Gout Father     Arthritis-osteo Father     Osteoporosis Mother     Arthritis-osteo Mother     Allergic Rhinitis Mother         There is no immunization history on file for this patient. PTA Medications:  Prior to Admission Medications   Prescriptions Last Dose Informant Patient Reported? Taking? ALPRAZolam (XANAX) 1 mg tablet   Yes Yes   Sig: Take 1 mg by mouth three (3) times daily as needed for Anxiety. Calcium-Cholecalciferol, D3, (CALCIUM 600 WITH VITAMIN D3) 600 mg(1,500mg) -400 unit cap   Yes Yes   Sig: Take  by mouth. Cetirizine (ZYRTEC) 10 mg cap   Yes Yes   Sig: Take  by mouth daily as needed. Indications: ALLERGIC RHINITIS   Desvenlafaxine SR (PRISTIQ) 50 mg tablet   Yes Yes   Sig: Take 50 mg by mouth.    MULTIVITAMIN PO   Yes Yes   Sig: Take  by mouth. QUEtiapine (SEROQUEL) 300 mg tablet   Yes Yes   Sig: Take 600 mg by mouth nightly. Indications: DELUSIONS   acetaminophen (TYLENOL) 500 mg tablet   Yes Yes   Sig: Take 1 Tab by mouth every six (6) hours as needed for Pain. cholecalciferol, VITAMIN D3, (VITAMIN D3) 5,000 unit tab tablet   Yes Yes   Sig: Take  by mouth daily. esomeprazole (NEXIUM) 40 mg capsule   No Yes   Sig: Take 1 cap by mouth twice daily  Indications: gastroesophageal reflux disease   fludrocortisone (FLORINEF) 0.1 mg tablet   No Yes   Sig: Take 1 Tab by mouth daily. levothyroxine (SYNTHROID) 200 mcg tablet   No Yes   Sig: Take 1 Tab by mouth Daily (before breakfast). ofloxacin (FLOXIN) 0.3 % otic solution   No Yes   Sig: Administer 5 Drops in left ear two (2) times a day. polyethylene glycol (MIRALAX) 17 gram/dose powder   Yes Yes   Sig: Take 17 g by mouth daily. risperiDONE (RISPERDAL) 2 mg tablet   Yes Yes   Si mg four (4) times daily. Facility-Administered Medications: None       Review of Systems:  Gen: No weight loss  Eyes: no vision changes  ENT: no sore throat  Resp: No sob or cough  CV: No chest pain  Abd: + abd pain, no vomiting or diarrhea  : No incontinence, no hematuria or dysuria, no flank pain  MSK: no leg swelling  Neuro: No HA or dizziness, no weakness, numbness/tingling    Objective:   Patient Vitals for the past 24 hrs:   Temp Pulse Resp BP SpO2   18 1410 - 95 - 168/89 96 %   18 1224 97.9 °F (36.6 °C) (!) 114 16 120/79 94 %     Oxygen Therapy  O2 Sat (%): 96 % (18 1410)  Pulse via Oximetry: 95 beats per minute (18 1410)  O2 Device: Room air (18 1224)  No intake or output data in the 24 hours ending 18 1628    Physical Exam:  General:    Well nourished. Alert. Eyes:   Right scleral icterus, left eyelid closed  ENT:  Normocephalic, atraumatic. Moist mucous membranes, decreased hearing  CV:   RRR. No murmur, rub, or gallop.     Lungs: CTAB.  No wheezing, rhonchi, or rales. Abdomen: Soft, tender on palpation of epigastrium,  nondistended. Bowel sounds normal.   Extremities: Warm and dry. No cyanosis or edema. Neurologic: CN II-XII grossly intact. Sensation intact. Skin:     Jaundiced  Psych:  Normal mood and affect. I reviewed the labs, imaging, EKGs, telemetry, and other studies done this admission. Data Review:   Recent Results (from the past 24 hour(s))   CBC WITH AUTOMATED DIFF    Collection Time: 06/09/18 12:37 PM   Result Value Ref Range    WBC 12.1 (H) 4.3 - 11.1 K/uL    RBC 4.09 4.05 - 5.25 M/uL    HGB 11.4 (L) 11.7 - 15.4 g/dL    HCT 34.2 (L) 35.8 - 46.3 %    MCV 83.6 79.6 - 97.8 FL    MCH 27.9 26.1 - 32.9 PG    MCHC 33.3 31.4 - 35.0 g/dL    RDW 19.3 (H) 11.9 - 14.6 %    PLATELET 222 (H) 141 - 450 K/uL    MPV 11.1 10.8 - 14.1 FL    DF AUTOMATED      NEUTROPHILS 66 43 - 78 %    LYMPHOCYTES 24 13 - 44 %    MONOCYTES 5 4.0 - 12.0 %    EOSINOPHILS 3 0.5 - 7.8 %    BASOPHILS 1 0.0 - 2.0 %    IMMATURE GRANULOCYTES 1 0.0 - 5.0 %    ABS. NEUTROPHILS 8.1 1.7 - 8.2 K/UL    ABS. LYMPHOCYTES 2.9 0.5 - 4.6 K/UL    ABS. MONOCYTES 0.6 0.1 - 1.3 K/UL    ABS. EOSINOPHILS 0.4 0.0 - 0.8 K/UL    ABS. BASOPHILS 0.1 0.0 - 0.2 K/UL    ABS. IMM. GRANS. 0.1 0.0 - 0.5 K/UL   METABOLIC PANEL, COMPREHENSIVE    Collection Time: 06/09/18 12:37 PM   Result Value Ref Range    Sodium 137 136 - 145 mmol/L    Potassium 5.5 (H) 3.5 - 5.1 mmol/L    Chloride 100 98 - 107 mmol/L    CO2 24 21 - 32 mmol/L    Anion gap 13 7 - 16 mmol/L    Glucose 107 (H) 65 - 100 mg/dL    BUN 10 6 - 23 MG/DL    Creatinine 0.76 0.6 - 1.0 MG/DL    GFR est AA >60 >60 ml/min/1.73m2    GFR est non-AA >60 >60 ml/min/1.73m2    Calcium 8.7 8.3 - 10.4 MG/DL    Bilirubin, total 10.5 (H) 0.2 - 1.1 MG/DL    ALT (SGPT) 364 (H) 12 - 65 U/L    AST (SGOT) 403 (H) 15 - 37 U/L    Alk.  phosphatase 975 (H) 50 - 136 U/L    Protein, total 8.4 (H) 6.3 - 8.2 g/dL    Albumin 2.3 (L) 3.5 - 5.0 g/dL    Globulin 6.1 (H) 2.3 - 3.5 g/dL    A-G Ratio 0.4 (L) 1.2 - 3.5     LIPASE    Collection Time: 06/09/18 12:37 PM   Result Value Ref Range    Lipase 42 (L) 73 - 393 U/L   PROTHROMBIN TIME + INR    Collection Time: 06/09/18  1:24 PM   Result Value Ref Range    Prothrombin time 14.8 (H) 11.5 - 14.5 sec    INR 1.2         All Micro Results     None          Other Studies:  Mri Abd W Wo Cont    Result Date: 6/9/2018  Examination: MRI abdomen with and without contrast Indication: upper abd pain, jaundice, 37 years Female jaundice and abdominal pain for a month Comparison: Abdominal ultrasound May 31, 2018. Technique:  Multiplanar, multisequence MR imaging was performed of the abdomen prior to and following gadolinium contrast. 10 cc Multihance contrast material was administrated intravenously for the examination. 3-dimensional MRCP was performed using maximum intensity projection reconstruction. This study was acquired following the IV administration of contrast material, given the patient's indications for the examination. If IV contrast material had not been administered, the likely of detecting abnormalities relevant to the patient's condition would have been substantially decreased. Findings: Trace bilateral pleural effusions. The liver is unremarkable in contour. There are multiple heterogeneous centrally hypoenhancing masses throughout the liver, with suggestion of peripheral delayed rim enhancement, measuring up to 4.7 x 4.4 cm in the central left hepatic lobe and 3.4 x 5.0 cm in the posterior right hepatic lobe these are suspicious for hepatic metastatic disease versus multifocal cholangiocarcinoma. 1 these masses is centered in the caudate lobe and may be causing hilar biliary obstruction, with resulting mild diffuse intrahepatic biliary ductal dilatation. The mid to lower portion of the common bile duct are normal in caliber measuring up to 3 mm in diameter.   There appears to be a small filling defect in the inferior common bile duct measuring up to 3 mm in diameter, this may represent choledocholithiasis. Normal-appearing pancreatic duct. Visualized hepatic venous and portal venous branches appear patent. Post contrast image quality is somewhat degraded by motion artifact. The pancreas, spleen, adrenal glands, and kidneys are unremarkable. No evidence of intraperitoneal free fluid. No evidence of significant lymphadenopathy. The aorta, IVC, portal veins, and hepatic veins appear patent. The visualized small and large bowel appear unremarkable. No evidence of aggressive osseous lesion. IMPRESSION: 1. Findings highly suspicious for diffuse hepatic metastatic disease versus multifocal cholangiocarcinoma. Image quality somewhat degraded by motion artifact. Percutaneous tissue sampling may be helpful in further evaluation. Evidence of central biliary obstruction by tumor. 2.  Question small choledocholithiasis in the inferior common bile duct. 3.  Other incidental findings as above. Assessment and Plan:     Hospital Problems as of 6/9/2018  Date Reviewed: 5/22/2018          Codes Class Noted - Resolved POA    Jaundice ICD-10-CM: R17  ICD-9-CM: 782.4  6/9/2018 - Present Unknown        Gastroparesis ICD-10-CM: K31.84  ICD-9-CM: 536.3  6/1/2018 - Present Yes        Cognitive developmental delay (Chronic) ICD-10-CM: F81.9  ICD-9-CM: 315.9  5/31/2018 - Present Yes        Hyperlipidemia ICD-10-CM: E78.5  ICD-9-CM: 272.4  9/22/2016 - Present Yes        Hypothyroidism (Chronic) ICD-10-CM: E03.9  ICD-9-CM: 244.9  10/13/2014 - Present Yes        Depression (Chronic) ICD-10-CM: F32.9  ICD-9-CM: 253  10/13/2014 - Present Yes        GERD (gastroesophageal reflux disease) (Chronic) ICD-10-CM: K21.9  ICD-9-CM: 530.81  10/13/2014 - Present Yes              PLAN:    Jaundice: Most likely secondary to obstructing mass, MRI Abdomen: +diffuse hepatic  metastatic disease versus  multifocal cholangiocarcinoma. Evidence of central biliary obstruction by tumor. Question small choledocholithiasis in the inferior common bile duct.   Plan  GI consulted, will discuss MRCP results with patient and mother  Tumor markers and tylenol levels pending  Avoid nephrotoxic agents  DM2 : Follows outpatient Endo   Recently started on metformin, however recently stopped due to transaminitis  A1C 5.9 % on 05/2018  Plan  Insulin sliding scale   Transaminitis: Most likely secondary to obstructive mass, GI following  Hyperkalemia: kayexalate ordered, chem 7 pending  Secondary hypothyroidism: Continue synthroid  Paranoid schizophrenia: Continue risperidone, quetiapine, desvenlafaxine    Anxiety: PRN Xanax      DVT ppx:  heparin  Anticipated DC needs: discharge in >48 hours, PT/OT, at baseline lives with mother    Code status:  Full  Estimated LOS:  Greater than 2 midnights  Risk:  high    Signed:  Josiah Moran MD

## 2018-06-10 PROBLEM — R97.8 ELEVATED CA 19-9 LEVEL: Status: ACTIVE | Noted: 2018-01-01

## 2018-06-10 PROBLEM — R74.8 ELEVATED LIVER ENZYMES: Status: ACTIVE | Noted: 2018-01-01

## 2018-06-10 PROBLEM — R97.0 ELEVATED CEA: Status: ACTIVE | Noted: 2018-01-01

## 2018-06-10 NOTE — PROGRESS NOTES
Reviewing notes for spiritual concerns  Will continue to follow during this stay.     Pedro Luis Nunezos, staff

## 2018-06-10 NOTE — PROGRESS NOTES
Problem: Falls - Risk of  Goal: *Absence of Falls  Document Ye Fall Risk and appropriate interventions in the flowsheet.    Outcome: Progressing Towards Goal  Fall Risk Interventions:            Medication Interventions: Evaluate medications/consider consulting pharmacy, Patient to call before getting OOB, Teach patient to arise slowly

## 2018-06-10 NOTE — PROGRESS NOTES
Problem: Falls - Risk of  Goal: *Absence of Falls  Document Ye Fall Risk and appropriate interventions in the flowsheet.    Outcome: Progressing Towards Goal  Fall Risk Interventions:            Medication Interventions: Evaluate medications/consider consulting pharmacy, Teach patient to arise slowly, Patient to call before getting OOB

## 2018-06-10 NOTE — PROGRESS NOTES
END OF SHIFT NOTE:    Intake/Output      Voiding: NO  Catheter: NO  Drain:              Stool:  0 occurrences. Emesis:  0 occurrences. VITAL SIGNS  Patient Vitals for the past 12 hrs:   Temp Pulse Resp BP SpO2   06/10/18 0528 98.4 °F (36.9 °C) 97 20 132/62 93 %   06/09/18 2322 99 °F (37.2 °C) (!) 104 20 117/52 94 %   06/09/18 1939 98.3 °F (36.8 °C) 99 22 154/73 95 %       Pain Assessment  Pain 1  Pain Scale 1: Numeric (0 - 10) (06/09/18 1959)  Pain Intensity 1: 0 (06/09/18 1959)  Patient Stated Pain Goal: 0 (06/09/18 1959)    Ambulating  No    Additional Information: Pt's mother reports pt has not voided since admission. Pt has been asleep throughout the shift only responding when encouraged to wake up. Pt is refusing sips of water offered by staff. Pt received x1 dose of flagyl and x1 dose of IM rocephin. Mother requested privacy for rest throughout the night. Pt resting quietly. No visible s/sx of distress. Bladder scan recommended. Shift report given to oncoming nurse at the bedside.     Alireza Kwan RN

## 2018-06-10 NOTE — INTERDISCIPLINARY ROUNDS
IDR completed MD NP and charge RN present     MD explained that confirmation of dz needed to obtained.     Treatment options would be further discussed at that time    For now the natural course of the dz was discussed and the treatment options if indeed cholangiocarcinoma is confirmed    No plans for discharge at this time

## 2018-06-10 NOTE — CONSULTS
[facility]        Inpatient Hematology / Oncology Consult Note    Reason for Consult:  Jaundice  Referring Physician:  Nini Redding MD    History of Present Illness:  Ms. Nena Guzman is a 44 y.o. female admitted on 6/9/2018. 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 We have been asked to see this patient for concern of metastatic cancer, likely biliary origin. Mom/dad at bedside to assist with history. Only family history of cancer is maternal 1/2 brother who had gallbladder cancer which was found during liver transplant surgery and paternal grandmother had breast cancer. She has 1 brother who is well. At baseline, she lives with her parents and does crafts/croquets to keep busy. Not very active per mom. She was admitted 5/31 with GERD, LFTs. Hepatitis panel was negative. Had EGD showed mild esophagitis and gastric erosion and subsequently discharged with plans for outpatient HIDA scan. TBili up to 3.8 on 5/31. RUQ ultrasound found fatty liver. She was directed to stop her statins. Bilirubin progressively worse during follow up with GI with increasing jaundice and directed to ER for admission. Abdominal pain is actually better than has been per mom. No bowel changes. No vomiting. Appetite has been depressed but better today. MRCP done yesterday concerning for metastatic disease of the liver vs multifocal cholangiocarcinoma. GI is following and plans for ERCP tomorrow. Review of Systems:  Constitutional + fatigue. Denies fever, chills, weight loss, appetite changes, fatigue, night sweats. HEENT Denies trauma, blurry vision, hearing loss, ear pain, nosebleeds, sore throat, neck pain    Skin + jaundice. Denies lesions or rashes. Lungs Denies dyspnea, cough, sputum production or hemoptysis. Cardiovascular Denies chest pain, palpitations, or lower extremity edema. Gastrointestinal + mild abdominal pain - better today. Denies nausea, vomiting, changes in bowel habits, bloody or black stools    Denies dysuria, frequency or hesitancy of urination. Neuro Denies headaches, visual changes or ataxia. Denies dizziness, tingling, tremors, sensory change, speech change, focal weakness       Hematology Denies easy bruising or bleeding, denies gingival bleeding or epistaxis. Endo Denies heat/cold intolerance, denies diabetes or thyroid abnormalities. MSK Denies back pain, arthralgias, myalgias or frequent falls. Psychiatric/Behavioral Denies depression and substance abuse. The patient is not nervous/anxious.          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 Wilbert Gosselin Psychiatry     [facility]anoid type delusional disorder ([facility]     from chemo as child- had brain tumor 1984    Stroke [facility]) 10-12-14    TIA; taking aspirin daily; pt released from neurologist care per mother    Tachycardia 11/8/2016    TIA (transient ischemic attack) 10/13/2014    Vasovagal syncope 3/2/2017     (ventriculoperitoneal) shunt status     not functioning based on 2016 shunt xray series     Past Surgical History:   Procedure Laterality Date    HX CRANIOTOMY  1984    HX CRANIOTOMY  2007    benign brain tumor    HX CSF SHUNT  1984    HX HEENT      tubes in ears    HX MYRINGOTOMY Bilateral     HX OTHER SURGICAL  1984    port placement for chemo    HX OTHER SURGICAL Left      SPHENOID BALLOON SINUPLASTY     HX OTHER SURGICAL      MENINGIOMA REMOVE     HX OTHER SURGICAL       SHUNT, CHEMO CATH     Family History   Problem Relation Age of Onset    Diabetes Father     Hypertension Father    [facility] Gout Father     Arthritis-osteo Father     Osteoporosis Mother     Arthritis-osteo Mother     Allergic Rhinitis Mother      Social History     Social History    Marital status: SINGLE     Spouse name: N/A    Number of children: N/A    Years of education: N/A     Occupational History    Not on file.      Social History Main Topics    Smoking status: Never Smoker    Smokeless tobacco: Never Used    Alcohol use No    Drug use: No    Sexual activity: Not Currently     Other Topics Concern    Seat Belt Yes     Social History Narrative    Lives at home with Mom and Dad     Current Facility-Administered Medications   Medication Dose Route Frequency Provider Last Rate Last Dose    ALPRAZolam (XANAX) tablet 1 mg  1 mg Oral TID PRN Bishop Jaxon MD   1 mg at 06/09/18 1829    calcium-vitamin D (OS-ESTRADA) 500 mg-200 unit tablet  1 Tab Oral DAILY Bishop Jaxon MD   1 Tab at 06/10/18 0918    desvenlafaxine succinate (PRISTIQ) ER tablet 50 mg (Patient Supplied)  50 mg Oral DAILY Bishop Jaxon MD   50 mg at 06/10/18 0918    pantoprazole (PROTONIX) tablet 40 mg  40 mg Oral ACB&D Bishop Jaxon MD   40 mg at 06/10/18 0804    fludrocortisone (FLORINEF) tablet 100 mcg  0.1 mg Oral DAILY Bishop Jaxon MD   100 mcg at 06/10/18 0918    levothyroxine (SYNTHROID) tablet 200 mcg  200 mcg Oral Francoallison Mariscal MD   200 mcg at 06/10/18 0804    QUEtiapine (SEROquel) tablet 600 mg  600 mg Oral QHS Masood Joy MD   600 mg at 18 2157    risperiDONE (RisperDAL) tablet 1 mg  1 mg Oral QID Masood Joy MD   1 mg at 06/10/18 1241    sodium chloride (NS) flush 5-10 mL  5-10 mL IntraVENous Q8H Masood Joy MD   10 mL at 18 1701    sodium chloride (NS) flush 5-10 mL  5-10 mL IntraVENous PRN Masood Joy MD        piperacillin-tazobactam (ZOSYN) 3.375 g in 0.9% sodium chloride (MBP/ADV) 100 mL  3.375 g IntraVENous Q8H Kira Barnes NP   Stopped at 18 2200    insulin lispro (HUMALOG) injection   SubCUTAneous AC&HS Masood Joy MD   Stopped at 06/10/18 0802    heparin (porcine) injection 5,000 Units  5,000 Units SubCUTAneous Q12H Masood Joy MD   5,000 Units at 06/10/18 0524       OBJECTIVE:  Patient Vitals for the past 8 hrs:   BP Temp Pulse Resp SpO2   06/10/18 1125 155/80 98.4 °F (36.9 °C) 96 20 90 %   06/10/18 0735 151/73 98.8 °F (37.1 °C) 98 20 93 %   06/10/18 0528 132/62 98.4 °F (36.9 °C) 97 20 93 %     Temp (24hrs), Av.4 °F (36.9 °C), Min:97.5 °F (36.4 °C), Max:99 °F (37.2 °C)    06/10 0701 - 06/10 1900  In: 480 [P.O.:480]  Out: 350 [Urine:350]    Physical Exam:  Constitutional: Developmentally delayed female in no acute distress,lying in hospital bed   HEENT: Normocephalic and atraumatic. Oropharynx is clear, mucous membranes are moist. Sclera icteric. Neck supple    Lymph node   No palpable submandibular, cervical, supraclavicular, axillary or inguinal lymph nodes. Skin Warm and dry. No bruising and no rash noted. + jaundice   Respiratory Lungs are clear to auscultation bilaterally without wheezes, rales or rhonchi, normal air exchange without accessory muscle use. CVS Normal rate, regular rhythm and normal S1 and S2. No murmurs, gallops, or rubs. Abdomen Soft, + mildly distended, no significant tenderness, normoactive bowel sounds.   No palpable mass    Neuro Grossly nonfocal with no obvious sensory or motor deficits. MSK Normal range of motion in general.  No edema and no tenderness. Psych Appropriate mood and affect. Labs:    Recent Results (from the past 24 hour(s))   PROTHROMBIN TIME + INR    Collection Time: 06/09/18  1:24 PM   Result Value Ref Range    Prothrombin time 14.8 (H) 11.5 - 14.5 sec    INR 1.2     ACETAMINOPHEN    Collection Time: 06/09/18  6:20 PM   Result Value Ref Range    Acetaminophen level <2 (L) 10.0 - 30.0 ug/mL   CANCER AG 19-9    Collection Time: 06/09/18  6:20 PM   Result Value Ref Range    Cancer antigen 19-9 1316.40 (H) 2.0 - 37.0 U/mL   CEA    Collection Time: 06/09/18  6:20 PM   Result Value Ref Range    CEA 25.8 (H) 0.0 - 3.0 ng/mL   GLUCOSE, POC    Collection Time: 06/10/18  7:59 AM   Result Value Ref Range    Glucose (POC) 94 65 - 100 mg/dL   GLUCOSE, POC    Collection Time: 06/10/18 11:41 AM   Result Value Ref Range    Glucose (POC) 107 (H) 65 - 100 mg/dL       Imaging:  MRI ABD W WO CONT [[id number]] Collected: 06/09/18 1550      Order Status: Completed Updated: 06/09/18 1604     Narrative:       Examination: MRI abdomen with and without contrast    Indication: upper abd pain, jaundice, 37 years Female jaundice and abdominal  pain for a month    Comparison: Abdominal ultrasound May 31, 2018. Technique:  Multiplanar, multisequence MR imaging was performed of the abdomen  prior to and following gadolinium contrast. 10 cc Multihance contrast material  was administrated intravenously for the examination.  3-dimensional MRCP was  performed using maximum intensity projection reconstruction.      This study was acquired following the IV administration of contrast material,  given the patient's indications for the examination. If IV contrast material had  not been administered, the likely of detecting abnormalities relevant to the  patient's condition would have been substantially decreased. Findings:  Trace bilateral pleural effusions.     The liver is unremarkable in contour.  There are multiple heterogeneous  centrally hypoenhancing masses throughout the liver, with suggestion of  peripheral delayed rim enhancement, measuring up to 4.7 x 4.4 cm in the central  left hepatic lobe and 3.4 x 5.0 cm in the posterior right hepatic lobe these are  suspicious for hepatic metastatic disease versus multifocal cholangiocarcinoma. 1 these masses is centered in the caudate lobe and may be causing hilar biliary  obstruction, with resulting mild diffuse intrahepatic biliary ductal dilatation.   The mid to lower portion of the common bile duct are normal in caliber  measuring up to 3 mm in diameter.  There appears to be a small filling defect in  the inferior common bile duct measuring up to 3 mm in diameter, this may  represent choledocholithiasis.  Normal-appearing pancreatic duct.  Visualized  hepatic venous and portal venous branches appear patent.  Post contrast image  quality is somewhat degraded by motion artifact. The pancreas, spleen, adrenal glands, and kidneys are unremarkable. No evidence of intraperitoneal free fluid.  No evidence of significant  lymphadenopathy. The aorta, IVC, portal veins, and hepatic veins appear patent. The visualized small and large bowel appear unremarkable. No evidence of aggressive osseous lesion.       Impression:       IMPRESSION:  1.  Findings highly suspicious for diffuse hepatic metastatic disease versus  multifocal cholangiocarcinoma.  Image quality somewhat degraded by motion  artifact.  Percutaneous tissue sampling may be helpful in further evaluation. Evidence of central biliary obstruction by tumor. 2.  Question small choledocholithiasis in the inferior common bile duct. 3.  Other incidental findings as above.          ASSESSMENT:  Problem List  Date Reviewed: 5/22/2018          Codes Class Noted    Elevated CA 19-9 level ICD-10-CM: R97.8  ICD-9-CM: 795.89  6/10/2018        Elevated CEA ICD-10-CM: R97.0  ICD-9-CM: 795.81  6/10/2018        * (Principal)Elevated liver enzymes ICD-10-CM: R74.8  ICD-9-CM: 790.5  6/10/2018        Jaundice ICD-10-CM: R17  ICD-9-CM: 782.4  6/9/2018        Cholelithiasis ICD-10-CM: K80.20  ICD-9-CM: 574.20  6/1/2018        Gastric erosion ICD-10-CM: K25.9  ICD-9-CM: 535.40  6/1/2018        Gastroparesis ICD-10-CM: K31.84  ICD-9-CM: 536.3  6/1/2018        Cognitive developmental delay (Chronic) ICD-10-CM: F81.9  ICD-9-CM: 315.9  5/31/2018        Elevated liver function tests ICD-10-CM: R79.89  ICD-9-CM: 790.6  5/31/2018        Anemia (Chronic) ICD-10-CM: D64.9  ICD-9-CM: 285.9  5/31/2018        Elevated blood pressure reading ICD-10-CM: R03.0  ICD-9-CM: 796.2  5/31/2018        Hypomagnesemia ICD-10-CM: E83.42  ICD-9-CM: 275.2  5/31/2018        Hyperglycemia (Chronic) ICD-10-CM: R73.9  ICD-9-CM: 790.29  5/31/2018        Obesity ICD-10-CM: E66.9  ICD-9-CM: 278.00  11/15/2017        Elevated blood sugar ICD-10-CM: R73.9  ICD-9-CM: 790.29  11/15/2017        Organic psychosis ICD-10-CM: S28  ICD-9-CM: 294.9  Unknown    Overview Signed 7/6/2017  7:31 AM by Crystal Fairchild MD     Sees Dr. [facility]              Esophagitis ICD-10-CM: K20.9  ICD-9-CM: 530.10  5/4/2017    Overview Signed 5/5/2017  2:26 PM by Crystal Fairchild MD     [id number] EGD showed healing esophagitis improved compared to prior EGD - plan continue omprezole              (ventriculoperitoneal) shunt status ICD-10-CM: Z98.2  ICD-9-CM: V45.2  Unknown    Overview Signed 3/21/2017  3:57 PM by Crystal Fairchild MD     not functioning based on 2016 shunt xray series             Secondary hypothyroidism ICD-10-CM: E03.8  ICD-9-CM: 244.8  3/2/2017        Vasovagal syncope ICD-10-CM: R55  ICD-9-CM: 780.2  3/2/2017        Paranoid schizophrenia ([facility] ICD-10-CM: F20.0  ICD-9-CM: 295.30  Unknown        OCD (obsessive compulsive disorder) ICD-10-CM: F42.9  ICD-9-CM: 300.3  Unknown        Tachycardia (Chronic) ICD-10-CM: R00.0  ICD-9-CM: 785.0  11/8/2016        Intellectual disability ICD-10-CM: F79  ICD-9-CM: 899  Unknown        Hyperlipidemia ICD-10-CM: E78.5  ICD-9-CM: 272.4  9/22/2016        Chronic sinusitis ICD-10-CM: J32.9  ICD-9-CM: 473.9  9/22/2016        TIA (transient ischemic attack) ICD-10-CM: G45.9  ICD-9-CM: 435.9  10/13/2014        Hypothyroidism (Chronic) ICD-10-CM: E03.9  ICD-9-CM: 244.9  10/13/2014        Depression (Chronic) ICD-10-CM: F32.9  ICD-9-CM: 951  10/13/2014        GERD (gastroesophageal reflux disease) (Chronic) ICD-10-CM: K21.9  ICD-9-CM: 530.81  10/13/2014                RECOMMENDATIONS:  Concern for metastatic disease/cholangiocarcinoma on imaging  - Will get CT CAP to further evaluate and complete staging. Follow up ERCP planned for tomorrow. If confirms maligancy, will need to send for next gen sequencing. If not enough tissue, may still need EUS. Discussed nature of metastatic cancer and ultimately poor prognosis. If confirmed,  and family to have luis manuel discussion regarding goals of care and benefit of treatment for her  - CA 19-9 1316, CEA 25.8    Hyperbilirubinemia  - From above. ERCP with stent placement tomorrow    Iron deficiency anemia  - Start replacements. Hgb adequate at 11.4     Hx paranoid schizophrenia  - Continue home meds     Lab studies and imaging studies (MRCP) were personally reviewed. Thank you for allowing us to participate in the care of Ms. Naheed Spence. We will follow along         [facility]  [address] [address]  Office : [phone]  Fax : [phone]       Attending Addendum:  I personally evaluated the patient with Tenzin Hudson N.P.,  and agree with the assessment, findings and plan as documented. Appears stable, heart regular without murmur, lungs clear, abdomen benign.   44 female h/o childhood CNS malignancy (described by parents as neuroectodermal tumor at 2 yrs age, treated at [facility] s/p whole brain and spine XRT followed by chemotherapy, reportedly received vincristine/cytoxan/cisplatin, felt to be in CR since, non-functioning  shunt placed around that time), mentally challenged (IQ described as around 11-16 yrs age w/ poor memory), lives with and is dependent on parents, schizophrenia, now admitted w abd pain & hyper-bilirubinemia, w imaging concerning for metastatic carcinoma, likely of biliary origin. Plan for ERCP w biopsy and stent tomorrow. Will get CT chest for staging completion. Further discussions regarding goals of care once biopsy results are back. If carcinoma confirmed than a palliative care approach would not be unreasonable given co-morbidities. Thank you for allowing us to participate in care of this pleasant patient. Please call w any questions.                 Neisha Hodge MD  [facility]  [id number] [place] [address]  Office : [phone]  Fax : [phone]

## 2018-06-10 NOTE — PROGRESS NOTES
GI DAILY PROGRESS NOTE    Admit Date:  6/9/2018    Today's Date:  6/10/2018    CC:  Elevated LFTs    Subjective:     Patient with uneventful last pm. Unable to get am labs due to access. Medications:   Current Facility-Administered Medications   Medication Dose Route Frequency    ALPRAZolam (XANAX) tablet 1 mg  1 mg Oral TID PRN    calcium-vitamin D (OS-ESTRADA) 500 mg-200 unit tablet  1 Tab Oral DAILY    desvenlafaxine succinate (PRISTIQ) ER tablet 50 mg (Patient Supplied)  50 mg Oral DAILY    pantoprazole (PROTONIX) tablet 40 mg  40 mg Oral ACB&D    fludrocortisone (FLORINEF) tablet 100 mcg  0.1 mg Oral DAILY    levothyroxine (SYNTHROID) tablet 200 mcg  200 mcg Oral ACB    QUEtiapine (SEROquel) tablet 600 mg  600 mg Oral QHS    risperiDONE (RisperDAL) tablet 1 mg  1 mg Oral QID    sodium chloride (NS) flush 5-10 mL  5-10 mL IntraVENous Q8H    sodium chloride (NS) flush 5-10 mL  5-10 mL IntraVENous PRN    piperacillin-tazobactam (ZOSYN) 3.375 g in 0.9% sodium chloride (MBP/ADV) 100 mL  3.375 g IntraVENous Q8H    insulin lispro (HUMALOG) injection   SubCUTAneous AC&HS    heparin (porcine) injection 5,000 Units  5,000 Units SubCUTAneous Q12H       Review of Systems:  A review of system was obtained, with pertinent positives as listed above. All others are negative. Objective:   Vitals:  Visit Vitals    /73 (BP 1 Location: Right arm, BP Patient Position: At rest)    Pulse 98    Temp 98.8 °F (37.1 °C)    Resp 20    Wt 71.2 kg (157 lb)    SpO2 93%    Breastfeeding No    BMI 29.66 kg/m2     Intake/Output:  06/10 0701 - 06/10 1900  In: 240 [P.O.:240]  Out: -   06/08 1901 - 06/10 0700  In: 480 [P.O.:480]  Out: 0   Exam:  General appearance: alert, cooperative, no distress. Jaundice  Lungs: clear to auscultation bilaterally anteriorly  Heart: regular rate and rhythm  Abdomen: soft, non-tender.  Bowel sounds normal. No masses,  no organomegaly  Extremities: extremities normal, atraumatic, no cyanosis or edema  Neuro:  Alert and oriented      Data Review (Labs):    Recent Labs      06/09/18   1324  06/09/18   1237   WBC   --   12.1*   HGB   --   11.4*   HCT   --   34.2*   PLT   --   508*   MCV   --   83.6   NA   --   137   K   --   5.5*   CL   --   100   CO2   --   24   BUN   --   10   CREA   --   0.76   CA   --   8.7   GLU   --   107*   AP   --   975*   SGOT   --   403*   ALT   --   364*   TBILI   --   10.5*   LPSE   --   42*   PTP  14.8*   --    INR  1.2   --    Examination: MRI abdomen with and without contrast      Indication: upper abd pain, jaundice, 37 years Female jaundice and abdominal  pain for a month      Comparison: Abdominal ultrasound May 31, 2018.       Technique:  Multiplanar, multisequence MR imaging was performed of the abdomen  prior to and following gadolinium contrast. 10 cc Multihance contrast material  was administrated intravenously for the examination.  3-dimensional MRCP was  performed using maximum intensity projection reconstruction.        This study was acquired following the IV administration of contrast material,  given the patient's indications for the examination. If IV contrast material had  not been administered, the likely of detecting abnormalities relevant to the  patient's condition would have been substantially decreased.      Findings:  Trace bilateral pleural effusions.      The liver is unremarkable in contour.  There are multiple heterogeneous  centrally hypoenhancing masses throughout the liver, with suggestion of  peripheral delayed rim enhancement, measuring up to 4.7 x 4.4 cm in the central  left hepatic lobe and 3.4 x 5.0 cm in the posterior right hepatic lobe these are  suspicious for hepatic metastatic disease versus multifocal cholangiocarcinoma.    1 these masses is centered in the caudate lobe and may be causing hilar biliary  obstruction, with resulting mild diffuse intrahepatic biliary ductal dilatation.   The mid to lower portion of the common bile duct are normal in caliber  measuring up to 3 mm in diameter.  There appears to be a small filling defect in  the inferior common bile duct measuring up to 3 mm in diameter, this may  represent choledocholithiasis.  Normal-appearing pancreatic duct.  Visualized  hepatic venous and portal venous branches appear patent.  Post contrast image  quality is somewhat degraded by motion artifact.      The pancreas, spleen, adrenal glands, and kidneys are unremarkable.      No evidence of intraperitoneal free fluid.  No evidence of significant  lymphadenopathy. The aorta, IVC, portal veins, and hepatic veins appear patent. The visualized small and large bowel appear unremarkable.      No evidence of aggressive osseous lesion.      IMPRESSION  IMPRESSION:  1.  Findings highly suspicious for diffuse hepatic metastatic disease versus  multifocal cholangiocarcinoma.  Image quality somewhat degraded by motion  artifact.  Percutaneous tissue sampling may be helpful in further evaluation. Evidence of central biliary obstruction by tumor. 2.  Question small choledocholithiasis in the inferior common bile duct. 3.  Other incidental findings as above. Assessment:     Active Problems:    Hypothyroidism (10/13/2014)      Depression (10/13/2014)      GERD (gastroesophageal reflux disease) (10/13/2014)      Hyperlipidemia (9/22/2016)      Cognitive developmental delay (5/31/2018)      Gastroparesis (6/1/2018)      Jaundice (6/9/2018)        Plan:     Patient is a 44 y.o. female with PMH of but not limited to GERD, anxiety, DM II, Prior Brain tumor with  shunt, parnoid schizophrenia, CVA,  Gastroparesis, who is seen in consultation at the request of Dr. Philbert Schilder for Elevated LFT and Jaundice. She has had slowly rising LFT since admission on 5/31/18. RUQ U/S during prior admission with findings of fatty liver. Statins and Metformin have been stopped recently as possible causes of her LFT elevations.  Labs in the ED today with TB 10.5, , , , WBC 12.1, . Afebrile. MRCP (as above) concerning for metastatic disease, likely cholangiocarcinoma. Elevated tumor markers ( 1316 and CEA 26).      - Long discussion with pt and both parents  - Plans for ERCP tomorrow with duct clearance (possible distal stone), brushings for cytology, and stenting (if able)  - If cytology is negative, she may need EUS  - Empiric Abx for mild leukocytosis though afebrile  - May need PICC for access: defer to primary team  - NPO after midnight  - Oncology to see today    Kat Alamo MD

## 2018-06-10 NOTE — PROGRESS NOTES
Nurse personnel contacted me in view of inability to obtain peripheral iv access. Multiple attempts made by them. On my evaluation, patient had no patent peripheral veins, nor over her neck area. In view of possible choledocholithiasis with no available iv access, I am ordering rocephin IM x 1 and flagyl po for now. Plan to contact vascular team in am to resume iv zosyn.

## 2018-06-10 NOTE — PROGRESS NOTES
PIV infiltrated. Multiple unsuccessful IV starts by charge ROCHELLE Eastman and Neelam Carreno RN. Dr Nir Mckeon notified and will come assess pt when possible but ordered consult for vascular team in the morning. Pt resting quietly. Mother is at bedside.

## 2018-06-10 NOTE — PROGRESS NOTES
PICC Placement Note    PRE-PROCEDURE VERIFICATION  Correct Procedure: yes. Time out completed with assistant Rylan Albarado RN and all persons present in agreement with time out. Correct Site:  yes  Temperature: Temp: 97.7 °F (36.5 °C), Temperature Source: Temp Source: Oral  Recent Labs      06/10/18   1540   BUN  9   CREA  0.67   PLT  464*   INR  1.4   WBC  8.6     Allergies: Geodon [ziprasidone hcl]  Education materials for [facility] given to patient or family. PROCEDURE DETAIL  A double lumen PICC line was started for vascular access and desire for reliable access. The following documentation is in addition to the PICC properties in the lines/airways flowsheet :  Lot #: [id number]  xylocaine used: yes  Mid-Arm Circumference: 33 (cm)  Internal Catheter Length: 33 (cm)  Internal Catheter Total Length: 36 (cm)  Vein Selection for PICC:right basilic  Central Line Bundle followed yes  Complication Related to Insertion: none  Both the insertion guidewire and sherlock guidewire were removed intact all ports have positive blood return and were flush well with normal saline. The placement was verified by sapiens ecg. The ECG results state the tip overlies the lower superior vena cava.          Line is okay to use: yes    Tayo Collado RN

## 2018-06-10 NOTE — PROGRESS NOTES
Hospitalist Progress Note    Patient: Denton Ramos MRN: [id number]  SSN: [id number]    YOB: 1980  Age: 44 y.o. Sex: female      Admit Date: 6/9/2018    LOS: 1 day     Subjective:     From H&P: \"45 yo female who presented with jaundice on a background of GERD, anxiety, DM II, Prior Brain tumor with  shunt, parnoid schizophrenia, CVA, and  Gastroparesis. She is a limited historian due to mental disability though oriented to person, place and time but her mother was present at bedside and provided additional history. Her mother reported that the patient had worsening jaundice since Tuesday. Her mentation is at baseline. The patient denied any BRBPR or melena. She denies any fever or chills. Her mother reports that she takes 500mg of tylenol 4 times/week, denies any NSAID use. She also notes dark urine.   Of note, she was recently admitted in 05/2018 with transaminitis with EGD showing esophagitis and gastric erosion. US abdomen showed fatty liver and gastric emptying study was positive for delayed emptying. She was also transfused pRBC at that time with plans for HIDA scan outpatient. \"    6/10 - The patient continues to be jaundiced. Mother thinks she looks more yellow today. Patient denies pain. Denies itching. Denies abdominal pain. Denies F/C/N/V. Review of systems negative except stated above.     Objective:     Visit Vitals    /80 (BP 1 Location: Right arm, BP Patient Position: At rest)    Pulse 96    Temp 98.4 °F (36.9 °C)    Resp 20    Wt 71.2 kg (157 lb)    SpO2 90%    Breastfeeding No    BMI 29.66 kg/m2      Oxygen Therapy  O2 Sat (%): 90 % (06/10/18 1125)  Pulse via Oximetry: 95 beats per minute (06/09/18 1410)  O2 Device: Room air (06/10/18 1125)      Intake and Output:   Intake/Output Summary (Last 24 hours) at 06/10/18 1213  Last data filed at 06/10/18 1148   Gross per 24 hour   Intake              720 ml   Output              350 ml   Net              370 ml Physical Exam:   GENERAL: alert, cooperative, no distress, appears stated age  EYE: Icterus. PERRL. THROAT & NECK: normal and no erythema or exudates noted. LUNG: clear to auscultation bilaterally  HEART: regular rate and rhythm, S1S2, no murmur, no JVD  ABDOMEN: soft, non-tender, non-distended. Bowel sounds normal.   EXTREMITIES:  No edema, 2+ pedal/radial pulses bilaterally  SKIN: Diffuse jaundice  NEUROLOGIC: Alert. Cranial nerves 2-12 grossly intact. Lab/Data Review:  Recent Results (from the past 24 hour(s))   CBC WITH AUTOMATED DIFF    Collection Time: 06/09/18 12:37 PM   Result Value Ref Range    WBC 12.1 (H) 4.3 - 11.1 K/uL    RBC 4.09 4.05 - 5.25 M/uL    HGB 11.4 (L) 11.7 - 15.4 g/dL    HCT 34.2 (L) 35.8 - 46.3 %    MCV 83.6 79.6 - 97.8 FL    MCH 27.9 26.1 - 32.9 PG    MCHC 33.3 31.4 - 35.0 g/dL    RDW 19.3 (H) 11.9 - 14.6 %    PLATELET 519 (H) 861 - 450 K/uL    MPV 11.1 10.8 - 14.1 FL    DF AUTOMATED      NEUTROPHILS 66 43 - 78 %    LYMPHOCYTES 24 13 - 44 %    MONOCYTES 5 4.0 - 12.0 %    EOSINOPHILS 3 0.5 - 7.8 %    BASOPHILS 1 0.0 - 2.0 %    IMMATURE GRANULOCYTES 1 0.0 - 5.0 %    ABS. NEUTROPHILS 8.1 1.7 - 8.2 K/UL    ABS. LYMPHOCYTES 2.9 0.5 - 4.6 K/UL    ABS. MONOCYTES 0.6 0.1 - 1.3 K/UL    ABS. EOSINOPHILS 0.4 0.0 - 0.8 K/UL    ABS. BASOPHILS 0.1 0.0 - 0.2 K/UL    ABS. IMM. GRANS. 0.1 0.0 - 0.5 K/UL   METABOLIC PANEL, COMPREHENSIVE    Collection Time: 06/09/18 12:37 PM   Result Value Ref Range    Sodium 137 136 - 145 mmol/L    Potassium 5.5 (H) 3.5 - 5.1 mmol/L    Chloride 100 98 - 107 mmol/L    CO2 24 21 - 32 mmol/L    Anion gap 13 7 - 16 mmol/L    Glucose 107 (H) 65 - 100 mg/dL    BUN 10 6 - 23 MG/DL    Creatinine 0.76 0.6 - 1.0 MG/DL    GFR est AA >60 >60 ml/min/1.73m2    GFR est non-AA >60 >60 ml/min/1.73m2    Calcium 8.7 8.3 - 10.4 MG/DL    Bilirubin, total 10.5 (H) 0.2 - 1.1 MG/DL    ALT (SGPT) 364 (H) 12 - 65 U/L    AST (SGOT) 403 (H) 15 - 37 U/L    Alk.  phosphatase 975 (H) 50 - 136 U/L    Protein, total 8.4 (H) 6.3 - 8.2 g/dL    Albumin 2.3 (L) 3.5 - 5.0 g/dL    Globulin 6.1 (H) 2.3 - 3.5 g/dL    A-G Ratio 0.4 (L) 1.2 - 3.5     LIPASE    Collection Time: 06/09/18 12:37 PM   Result Value Ref Range    Lipase 42 (L) 73 - 393 U/L   PROTHROMBIN TIME + INR    Collection Time: 06/09/18  1:24 PM   Result Value Ref Range    Prothrombin time 14.8 (H) 11.5 - 14.5 sec    INR 1.2     ACETAMINOPHEN    Collection Time: 06/09/18  6:20 PM   Result Value Ref Range    Acetaminophen level <2 (L) 10.0 - 30.0 ug/mL   CANCER AG 19-9    Collection Time: 06/09/18  6:20 PM   Result Value Ref Range    Cancer antigen 19-9 1316.40 (H) 2.0 - 37.0 U/mL   CEA    Collection Time: 06/09/18  6:20 PM   Result Value Ref Range    CEA 25.8 (H) 0.0 - 3.0 ng/mL   GLUCOSE, POC    Collection Time: 06/10/18  7:59 AM   Result Value Ref Range    Glucose (POC) 94 65 - 100 mg/dL   GLUCOSE, POC    Collection Time: 06/10/18 11:41 AM   Result Value Ref Range    Glucose (POC) 107 (H) 65 - 100 mg/dL       Imaging:  Jackie Browne Wo Cont    Result Date: 6/9/2018  IMPRESSION: 1. Findings highly suspicious for diffuse hepatic metastatic disease versus multifocal cholangiocarcinoma. Image quality somewhat degraded by motion artifact. Percutaneous tissue sampling may be helpful in further evaluation. Evidence of central biliary obstruction by tumor. 2.  Question small choledocholithiasis in the inferior common bile duct. 3.  Other incidental findings as above. No results found for this visit on 06/09/18. Cultures:   All Micro Results     None          Assessment/Plan:     Principal Problem:    Elevated liver enzymes (6/10/2018)  - MRCP concerning for malignancy  - To go for ERCP tomorrow  - Continue Zosyn for now  - Check CMP daily    Active Problems:    Jaundice (6/9/2018)  - Due to elevated bilirubin  - No itching or pain  - See #1      Elevated CA 19-9 level (6/10/2018)  - Likely due to malignancy  - To go for ERCP tomorrow  - Oncology consulted      Elevated CEA (6/10/2018)  - To go for ERCP tomorrow  - Oncology consulted      Hypothyroidism (10/13/2014)  - Continue Synthyoid      Cognitive developmental delay (5/31/2018)  - Continue Risperdal  - Continue Seroquel      Depression (10/13/2014)      GERD (gastroesophageal reflux disease) (10/13/2014)  - Continue Protonix      Hyperlipidemia (9/22/2016)  - Statin stopped due to #1      Gastroparesis (6/1/2018)    Dispo - To go for ERCP tomorrow    DIET REGULAR  DIET NPO    DVT Prophylaxis: Heparin      Signed By: Shirley Redman DO     Manuela 10, 2018

## 2018-06-10 NOTE — PROGRESS NOTES
END OF SHIFT NOTE:  Patient resting in bed, parents  present @ bedside. Patient voice no complain of pain or nausea. Appetite good. Two stools reported by patient's  mother. The second stool had visible blood noted. Potassium 2.8,  potassium replacement given as directed. Picc-line placement and quezada catheter placed as directed  Intake/Output  06/10 0701 - 06/10 1900  In: 840 [P.O.:840]  Out: 850 [Urine:850]   Voiding: yes   Catheter: yes   Drain:              Stool:  Two occurrences. Emesis:  No  occurrences. VITAL SIGNS  Patient Vitals for the past 12 hrs:   Temp Pulse Resp BP SpO2   06/10/18 1515 97.7 °F (36.5 °C) 97 20 150/75 95 %   06/10/18 1125 98.4 °F (36.9 °C) 96 20 155/80 90 %   06/10/18 0735 98.8 °F (37.1 °C) 98 20 151/73 93 %       Pain Assessment  Pain 1  Pain Scale 1: Numeric (0 - 10) (06/09/18 1959)  Pain Intensity 1: 0 (06/09/18 1959)  Patient Stated Pain Goal: 0 (06/09/18 1959)    Ambulating  Yes to bathroom     Additional Information:  See above     Shift report given to oncoming nurse Sujit Ash RN  at the bedside.     Neetu Flannery RN

## 2018-06-11 PROBLEM — R50.9 FEVER: Status: ACTIVE | Noted: 2018-01-01

## 2018-06-11 NOTE — PERIOP NOTES
TRANSFER - OUT REPORT:    Verbal report given to [address] on [facility]  being transferred to  for routine post - op       Report consisted of patients Situation, Background, Assessment and   Recommendations(SBAR). Information from the following report(s) SBAR, Kardex, OR Summary, Procedure Summary, Intake/Output and MAR was reviewed with the receiving nurse. Lines:   PICC Double Lumen 90/79/49 Right;Basilic (Active)   Central Line Being Utilized Yes 6/11/2018  4:26 PM   Criteria for Appropriate Use Limited/no vessel suitable for conventional peripheral access 6/11/2018  4:26 PM   Site Assessment Clean, dry, & intact 6/11/2018  4:26 PM   Phlebitis Assessment 0 6/11/2018  4:26 PM   Infiltration Assessment 0 6/11/2018  4:26 PM   Arm Circumference (cm) 33 cm 6/10/2018  2:50 PM   Date of Last Dressing Change 06/10/18 6/11/2018  8:15 AM   Dressing Status Clean, dry, & intact 6/11/2018  4:26 PM   Action Taken Blood drawn 6/11/2018  9:13 AM   External Catheter Length (cm) 3 centimeters 6/10/2018  2:50 PM   Dressing Type Disk with Chlorhexadine gluconate (CHG); Transparent 6/11/2018  8:15 AM   Hub Color/Line Status Purple;Capped 6/11/2018  8:15 AM   Positive Blood Return (Site #1) Yes 6/11/2018  9:13 AM   Hub Color/Line Status Red; Infusing 6/11/2018  8:15 AM   Positive Blood Return (Site #2) Yes 6/11/2018  2:15 AM   Alcohol Cap Used No 6/11/2018  4:26 PM        Opportunity for questions and clarification was provided. Patient transported with:   O2 @ 3 liters    VTE prophylaxis orders have been written for Blaire Mota. Patient and family given floor number and nurses name. Family updated re: pt status after security code verified.

## 2018-06-11 NOTE — PROGRESS NOTES
END OF SHIFT NOTE:    Intake/Output  06/10 1901 - 06/11 0700  In: 301 [I.V.:301]  Out: 1100 [Urine:1100]   Voiding: YES  Catheter: YES  Drain:              Stool:  0 occurrences. Emesis:  0 occurrences. VITAL SIGNS  Patient Vitals for the past 12 hrs:   Temp Pulse Resp BP SpO2   06/11/18 0406 98.3 °F (36.8 °C) 95 20 136/76 94 %   06/11/18 0006 99.2 °F (37.3 °C) - - - -   06/10/18 2239 (!) 101.3 °F (38.5 °C) (!) 102 20 161/90 93 %   06/10/18 1952 98.7 °F (37.1 °C) (!) 101 22 164/84 94 %       Pain Assessment  Pain 1  Pain Scale 1: Numeric (0 - 10) (06/11/18 0215)  Pain Intensity 1: 0 (06/11/18 0215)  Patient Stated Pain Goal: 0 (06/11/18 0215)    Ambulating  Yes    Additional Information: Patient rested well. Ran a 101.3 fever last night. Given tylenol and fever broke and stayed down. Ran blood cultures, urinalysis, and urine culture. Started oral contrast at 0630 for procedure today. Kept NPO at midnight. Shift report given to oncoming nurse at the bedside.     Rios Enrique

## 2018-06-11 NOTE — PROGRESS NOTES
Hospitalist Progress Note    Patient: Tanna Norris MRN: [id number]  SSN: [id number]    YOB: 1980  Age: 44 y.o. Sex: female      Admit Date: 6/9/2018    LOS: 2 days     Subjective:     From H&P: \"43 yo female who presented with jaundice on a background of GERD, anxiety, DM II, Prior Brain tumor with  shunt, parnoid schizophrenia, CVA, and  Gastroparesis. She is a limited historian due to mental disability though oriented to person, place and time but her mother was present at bedside and provided additional history. Her mother reported that the patient had worsening jaundice since Tuesday. Her mentation is at baseline. The patient denied any BRBPR or melena. She denies any fever or chills. Her mother reports that she takes 500mg of tylenol 4 times/week, denies any NSAID use. She also notes dark urine.   Of note, she was recently admitted in 05/2018 with transaminitis with EGD showing esophagitis and gastric erosion. US abdomen showed fatty liver and gastric emptying study was positive for delayed emptying. She was also transfused pRBC at that time with plans for HIDA scan outpatient. \" She was seen by GI and MRCP was ordered which was concerning for malignancy so ERCP was planned for 6/11/18. Oncology was consulted. 6/11 - The patient had a fever overnight. Currently feels good. She is a little sleepy this AM. Denies N/V/D. Review of systems negative except stated above.     Objective:     Visit Vitals    /89    Pulse 95    Temp 98.4 °F (36.9 °C)    Resp 16    Wt 72.7 kg (160 lb 4.8 oz)    SpO2 95%    Breastfeeding No    BMI 30.29 kg/m2      Oxygen Therapy  O2 Sat (%): 95 % (06/11/18 0749)  Pulse via Oximetry: 95 beats per minute (06/09/18 1410)  O2 Device: Room air (06/10/18 1856)      Intake and Output:     Intake/Output Summary (Last 24 hours) at 06/11/18 0852  Last data filed at 06/11/18 0842   Gross per 24 hour   Intake             1141 ml   Output             1950 ml Net             -809 ml         Physical Exam:   GENERAL: somnolent, cooperative, no distress, appears stated age  EYE: Icterus. PERRL. THROAT & NECK: normal and no erythema or exudates noted. LUNG: clear to auscultation bilaterally  HEART: regular rate and rhythm, S1S2, no murmur, no JVD  ABDOMEN: soft, non-tender, non-distended. Bowel sounds normal.   EXTREMITIES:  No edema, 2+ pedal/radial pulses bilaterally  SKIN: Diffuse jaundice  NEUROLOGIC: Somnolent. Cranial nerves 2-12 grossly intact. Lab/Data Review:  Recent Results (from the past 24 hour(s))   GLUCOSE, POC    Collection Time: 06/10/18 11:41 AM   Result Value Ref Range    Glucose (POC) 107 (H) 65 - 100 mg/dL   HEPATIC FUNCTION PANEL    Collection Time: 06/10/18  3:40 PM   Result Value Ref Range    Protein, total 7.1 6.3 - 8.2 g/dL    Albumin 2.1 (L) 3.5 - 5.0 g/dL    Globulin 5.0 (H) 2.3 - 3.5 g/dL    A-G Ratio 0.4 (L) 1.2 - 3.5      Bilirubin, total 8.5 (H) 0.2 - 1.1 MG/DL    Bilirubin, direct 7.4 (H) <0.4 MG/DL    Alk. phosphatase 918 (H) 50 - 136 U/L    AST (SGOT) 304 (H) 15 - 37 U/L    ALT (SGPT) 326 (H) 12 - 65 U/L   PROTHROMBIN TIME + INR    Collection Time: 06/10/18  3:40 PM   Result Value Ref Range    Prothrombin time 16.4 (H) 11.5 - 14.5 sec    INR 1.4     CBC WITH AUTOMATED DIFF    Collection Time: 06/10/18  3:40 PM   Result Value Ref Range    WBC 8.6 4.3 - 11.1 K/uL    RBC 3.63 (L) 4.05 - 5.25 M/uL    HGB 10.1 (L) 11.7 - 15.4 g/dL    HCT 30.4 (L) 35.8 - 46.3 %    MCV 83.7 79.6 - 97.8 FL    MCH 27.8 26.1 - 32.9 PG    MCHC 33.2 31.4 - 35.0 g/dL    RDW 19.6 (H) 11.9 - 14.6 %    PLATELET 135 (H) 084 - 450 K/uL    MPV 10.6 (L) 10.8 - 14.1 FL    DF AUTOMATED      NEUTROPHILS 58 43 - 78 %    LYMPHOCYTES 28 13 - 44 %    MONOCYTES 7 4.0 - 12.0 %    EOSINOPHILS 5 0.5 - 7.8 %    BASOPHILS 1 0.0 - 2.0 %    IMMATURE GRANULOCYTES 1 0.0 - 5.0 %    ABS. NEUTROPHILS 5.0 1.7 - 8.2 K/UL    ABS. LYMPHOCYTES 2.4 0.5 - 4.6 K/UL    ABS.  MONOCYTES 0.6 0.1 - 1.3 K/UL    ABS. EOSINOPHILS 0.4 0.0 - 0.8 K/UL    ABS. BASOPHILS 0.1 0.0 - 0.2 K/UL    ABS. IMM. GRANS. 0.1 0.0 - 0.5 K/UL   METABOLIC PANEL, BASIC    Collection Time: 06/10/18  3:40 PM   Result Value Ref Range    Sodium 139 136 - 145 mmol/L    Potassium 2.8 (LL) 3.5 - 5.1 mmol/L    Chloride 101 98 - 107 mmol/L    CO2 26 21 - 32 mmol/L    Anion gap 12 7 - 16 mmol/L    Glucose 129 (H) 65 - 100 mg/dL    BUN 9 6 - 23 MG/DL    Creatinine 0.67 0.6 - 1.0 MG/DL    GFR est AA >60 >60 ml/min/1.73m2    GFR est non-AA >60 >60 ml/min/1.73m2    Calcium 8.2 (L) 8.3 - 10.4 MG/DL   GLUCOSE, POC    Collection Time: 06/10/18 10:29 PM   Result Value Ref Range    Glucose (POC) 119 (H) 65 - 100 mg/dL   URINALYSIS W/ RFLX MICROSCOPIC    Collection Time: 06/11/18  4:06 AM   Result Value Ref Range    Color ORANGE      Appearance CLEAR      Specific gravity 1.022 1.001 - 1.023      pH (UA) 6.5 5.0 - 9.0      Protein TRACE (A) NEG mg/dL    Glucose NEGATIVE  mg/dL    Ketone NEGATIVE  NEG mg/dL    Bilirubin LARGE (A) NEG      Blood NEGATIVE  NEG      Urobilinogen 0.2 0.2 - 1.0 EU/dL    Nitrites NEGATIVE  NEG      Leukocyte Esterase TRACE (A) NEG      WBC 0-3 0 /hpf    RBC 0-3 0 /hpf    Epithelial cells 0 0 /hpf    Bacteria 0 0 /hpf    Casts 0-3 0 /lpf   HEPATIC FUNCTION PANEL    Collection Time: 06/11/18  4:06 AM   Result Value Ref Range    Protein, total 6.9 6.3 - 8.2 g/dL    Albumin 1.9 (L) 3.5 - 5.0 g/dL    Globulin 5.0 (H) 2.3 - 3.5 g/dL    A-G Ratio 0.4 (L) 1.2 - 3.5      Bilirubin, total 8.2 (H) 0.2 - 1.1 MG/DL    Bilirubin, direct 7.1 (H) <0.4 MG/DL    Alk. phosphatase 895 (H) 50 - 136 U/L    AST (SGOT) 281 (H) 15 - 37 U/L    ALT (SGPT) 305 (H) 12 - 65 U/L   GLUCOSE, POC    Collection Time: 06/11/18  7:34 AM   Result Value Ref Range    Glucose (POC) 87 65 - 100 mg/dL       Imaging:  Eugene Rodriguez Wo Cont    Result Date: 6/9/2018  IMPRESSION: 1.   Findings highly suspicious for diffuse hepatic metastatic disease versus multifocal cholangiocarcinoma. Image quality somewhat degraded by motion artifact. Percutaneous tissue sampling may be helpful in further evaluation. Evidence of central biliary obstruction by tumor. 2.  Question small choledocholithiasis in the inferior common bile duct. 3.  Other incidental findings as above. No results found for this visit on 06/09/18. Cultures: All Micro Results     Procedure Component Value Units Date/Time    CULTURE, URINE [[id number]] Collected:  06/11/18 0406    Order Status:  Completed Specimen:  Urine from [facility] Specimen Updated:  06/11/18 0428    CULTURE, BLOOD [[id number]] Collected:  06/10/18 2310    Order Status:  Completed Specimen:  Blood from Blood Updated:  06/11/18 0300          Assessment/Plan:     Principal Problem:    Elevated liver enzymes (6/10/2018)  - MRCP concerning for malignancy  - To go for ERCP today  - Continue Zosyn for now  - Check CMP daily    Active Problems:    Jaundice (6/9/2018)  - Due to elevated bilirubin  - No itching or pain  - See #1      Elevated CA 19-9 level (6/10/2018)  - Likely due to malignancy  - To go for ERCP today  - CT C/A/P ordered for today  - Oncology consulted      Elevated CEA (6/10/2018)  - To go for ERCP today  - CT C/A/P ordered for today  - Oncology consulted      Fever (6/11/2018)  - Isolated and resolved --> likely due to diego issues  - UA unremarkable  - Continue Zosyn      Hypothyroidism (10/13/2014)  - Continue Synthyoid      Cognitive developmental delay (5/31/2018)  - Continue Risperdal  - Continue Seroquel      Depression (10/13/2014)      GERD (gastroesophageal reflux disease) (10/13/2014)  - Continue Protonix      Hyperlipidemia (9/22/2016)  - Statin stopped due to #1      Gastroparesis (6/1/2018)    Dispo - To go for CT C/A/P & ERCP today.     DIET NPO    DVT Prophylaxis: Heparin      Signed By: Won Smith DO     June 11, 2018

## 2018-06-11 NOTE — PROGRESS NOTES
TRANSFER - IN REPORT:    Verbal report received from Kj Watson (name) on [address]  being received from [facility](unit) for routine progression of care      Report consisted of patients Situation, Background, Assessment and   Recommendations(SBAR). Information from the following report(s) Kardex was reviewed with the receiving nurse. Opportunity for questions and clarification was provided. Assessment completed upon patients arrival to unit and care assumed.

## 2018-06-11 NOTE — PROGRESS NOTES
Fever of 101.3. Called on call hospitalist and he ordered tylenol 650mg, urinalysis, urine cultures and blood cultures. Will continue to monitor.

## 2018-06-11 NOTE — PROGRESS NOTES
Problem: Falls - Risk of  Goal: *Absence of Falls  Document Ye Fall Risk and appropriate interventions in the flowsheet.    Outcome: Progressing Towards Goal  Fall Risk Interventions:  Mobility Interventions: Strengthening exercises (ROM-active/passive), Communicate number of staff needed for ambulation/transfer, Patient to call before getting OOB         Medication Interventions: Assess postural VS orthostatic hypotension, Evaluate medications/consider consulting pharmacy, Teach patient to arise slowly, Patient to call before getting OOB    Elimination Interventions: Call light in reach

## 2018-06-11 NOTE — ANESTHESIA POSTPROCEDURE EVALUATION
Post-Anesthesia Evaluation and Assessment    Patient: Evelin Funez MRN: [id number]  SSN: [id number]    YOB: 1980  Age: 44 y.o. Sex: female       Cardiovascular Function/Vital Signs  Visit Vitals    /70 (BP 1 Location: Left arm, BP Patient Position: At rest)    Pulse 85    Temp 36.7 °C (98.1 °F)    Resp 16    Wt 72.7 kg (160 lb 4.8 oz)    SpO2 98%    Breastfeeding No    BMI 30.29 kg/m2       Patient is status post total IV anesthesia anesthesia for Procedure(s):  ENDOSCOPIC RETROGRADE CHOLANGIOPANCREATOGRAPHY (ERCP)  ENDOSCOPIC SPHINCTEROTOMY  ENDOSCOPIC BALLOON dilation  ENDOSCOPIC BRUSHING  BILIARY STENT PLACEMENT. Nausea/Vomiting: None    Postoperative hydration reviewed and adequate. Pain:  Pain Scale 1: Visual (06/11/18 1655)  Pain Intensity 1: 0 (06/11/18 1655)   Managed    Neurological Status:   Neuro (WDL): Exceptions to WDL (06/11/18 1655)  Neuro  Neurologic State: Confused; Eyes open to stimulus; Eyes open spontaneously; Eyes open to voice (06/11/18 1655)  Orientation Level: Oriented X4 (06/11/18 1655)  Cognition: Follows commands (06/11/18 1655)   At baseline    Mental Status and Level of Consciousness: Arousable    Pulmonary Status:   O2 Device: Nasal cannula (06/11/18 1655)   Adequate oxygenation and airway patent    Complications related to anesthesia: None    Post-anesthesia assessment completed.  No concerns    Signed By: Zhanna Paz MD     June 11, 2018

## 2018-06-11 NOTE — PROGRESS NOTES
TRANSFER - IN REPORT:    Verbal report received from  [address]  on [address] [facility]  being received from Recovery Room  for ordered procedure      Report consisted of patients Situation, Background, Assessment and   Recommendations(SBAR). Information from the following report(s) SBAR and Kardex was reviewed with the receiving nurse. Opportunity for questions and clarification was provided. Assessment completed upon patients arrival to unit and care assumed.

## 2018-06-11 NOTE — PROCEDURES
ERCP: Endoscopic Retrograde Cholangiopancreatogram    DATE of PROCEDURE: 6/11/2018    INDICATION:  Elevated LFTs  Abnormal CT  Bile duct strictu    POSTPROCEDURE DIAGNOSIS:  Tight stricture of the common hepatic duct    MEDICATIONS ADMINISTERED:    1. General anesthesia    INSTRUMENT: [id number]    PROCEDURE:  After obtaining informed consent, the patient was placed in prone position on the fluoroscopy table. The patient was then sedated. The endoscope was passed under indirect technique to the oropharynx and gently passed into the duodenum. Only partial views of the stomach and duodenum were obtained. After the procedure, the patient was taken to the recovery area in stable condition. FINDINGS:  AMPULLA: Normal, small  BILE DUCT: cannulated using a 44 sphincterotome over a 0.035 wire. Initial attempt was in the PD x2. Wire was left in PD and a second wire was used for prompt biliary access. Wire advanced into intrahepatic ducts under fluoroscopic guidance, and a cholangiogram was obtained. This revealed a very small common bile duct, patent cystic duct, normal appearing gallbladder, and a tight stricture of the common hepatic duct. There were small, irregular, mildly dilated intrahepatic ducts that were partially filled. Contrast was minimized to avoid  Overfilling. An 8mm biliary sphincterotomy was performed over a guidewire without complications. The tome was exchanged for a 6mm hurricane dilating balloon. The stricture was dilated for one minute. A cytology brush was advanced across the stricture and sent for cytology. A 8.5Fr x 10cm plastic stent was place the stricture. Drainage of clear contrast through the stent, and a small amount of bile. Corrected and confirmed endoscopically. Good drainage of contrast was noted on films. PANCREATIC DUCT: Pancreatogram was intentionally not performed. Rectal Indomethicin Given: 100 mg    ASSESSMENT/PLAN:  1.  Clears x 2 hrs, then low fat diet for 24 hrs  2. Avoid ASA/NSAIDs x 2 weeks after biliary sphincterotomy  3.  Proceed with sigmoidoscopy in the morning to evaluate sigmoid stricture- concern for colon neoplasm      Rosy Gibbs MD  [facility], [place]

## 2018-06-11 NOTE — INTERVAL H&P NOTE
H&P Update:  Blaire Solis was seen and examined. History and physical has been reviewed. The patient has been examined.  There have been no significant clinical changes since the completion of the originally dated History and Physical.    Signed By: Jeff Aguilar MD     June 11, 2018 3:17 PM

## 2018-06-11 NOTE — ANESTHESIA PREPROCEDURE EVALUATION
Anesthetic History   No history of anesthetic complications            Review of Systems / Medical History  Patient summary reviewed and pertinent labs reviewed    Pulmonary  Within defined limits                 Neuro/Psych         TIA (10/14 on bASA since then) and psychiatric history     Cardiovascular  Within defined limits                Exercise tolerance: >4 METS     GI/Hepatic/Renal     GERD: well controlled           Endo/Other      Hypothyroidism: well controlled  Cancer (brain)     Other Findings   Comments: Paranoid delusional disorder  Depression  Brain tumor--left eye closed         Physical Exam    Airway  Mallampati: II  TM Distance: 4 - 6 cm  Neck ROM: normal range of motion   Mouth opening: Normal     Cardiovascular    Rhythm: regular           Dental  No notable dental hx       Pulmonary  Breath sounds clear to auscultation               Abdominal  GI exam deferred       Other Findings            Anesthetic Plan    ASA: 3  Anesthesia type: general          Induction: Intravenous  Anesthetic plan and risks discussed with: Patient

## 2018-06-11 NOTE — H&P (VIEW-ONLY)
[facility] Consult Note       Primary GI Physician: Dr. Lele Ross    Referring Physician:  Dr. Jesusa Goodell Date:  6/9/2018    Admit Date:  6/9/2018    Chief Complaint:  Jaundice    Subjective:     History of Present Illness:  Patient is a 44 y.o. female with PMH of but not limited to GERD, anxiety, DM II, Prior Brain tumor with  shunt, parnoid schizophrenia, CVA,  Gastroparesis, who is seen in consultation at the request of Dr. Chuck Ma for Elevated LFT and Jaundice. Mrs. Olinda Youssef was recently admitted to [facility] from 5/31/18 until 6/3/18 for GERD and elevated LFT. Hepatitis panel was negative. EGD during admission showed mild esophagitis and gastric erosion. She was transfused 2 units of PRBC during that admission for HGB of 7.4. She was discharged with plans for outpatient HIDA scan. Labs during that admission revealed TB 3.8, AST 83, , . Medication adjustments were made to stop her Statins. Alpha 1 Antitripysin 218 (H), ceruloplasmin 41, AMA 4.3, SHYANNE negative, ASMA 8. She followed up in our office on 6/5/18 with Dr. Kory Mendoza and additional labs were checked. Her 6/5/18 labs revealed a TB 7.5, DB 6.8, IB 0.70, , , . Her mother has noticed Mrs. Olinda Youssef becoming more jaundiced since that office visit and decided to bring her to the ED today. Labs in the ED today with TB 10.5, , , , WBC 12.1, . Mrs. Olinda Youssef does not complain of pain but does have RUQ tenderness on exam. She has not had any recent fever/chills. Her metformin was stopped after her appointment with Dr. Kory Mendoza on 6/5 as possible cause of elevated LFT. This was the only new medication recently. She has not been on any antibiotics. Her mother states that she has been taking Tylenol 1000mg every few days. Denies any other OTC medications. She has not had any changes in bowel pattern. She is having dark colored urine. She has not had any nausea/vomiting. She has been afebrile. Her mother accompanies her and helps with history. 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 Ben Miller Psychiatry     Paranoid type delusional disorder ([facility] [address]     from chemo as child- had brain tumor 1984    Stroke ([facility] [address] 10-12-14    TIA; taking aspirin daily; pt released from neurologist care per mother    Tachycardia 11/8/2016    TIA (transient ischemic attack) 10/13/2014    Vasovagal syncope 3/2/2017     (ventriculoperitoneal) shunt status     not functioning based on 2016 shunt xray series       PSH:  Past Surgical History:   Procedure Laterality Date    HX CRANIOTOMY  1984    HX CRANIOTOMY  2007    benign brain tumor    HX CSF SHUNT  1984    HX HEENT      tubes in ears    HX MYRINGOTOMY Bilateral     HX OTHER SURGICAL  1984    port placement for chemo    HX OTHER SURGICAL Left      SPHENOID BALLOON SINUPLASTY     HX OTHER SURGICAL      MENINGIOMA REMOVE     HX OTHER SURGICAL       SHUNT, CHEMO CATH       Allergies: Allergies   Allergen Reactions    Geodon [Ziprasidone Hcl] Rash       Home Medications:  Prior to Admission medications    Medication Sig Start Date End Date Taking?  Authorizing Provider   esomeprazole (NEXIUM) 40 mg capsule Take 1 cap by mouth twice daily  Indications: gastroesophageal reflux disease 6/3/18  Yes Jamie Mijares MD   levothyroxine (SYNTHROID) 200 mcg tablet Take 1 Tab by mouth Daily (before breakfast). 6/4/18  Yes Jamie Mijares MD   ofloxacin (FLOXIN) 0.3 % otic solution Administer 5 Drops in left ear two (2) times a day. 1/23/18  Yes Dontae Diehl,    Calcium-Cholecalciferol, D3, (CALCIUM 600 WITH VITAMIN D3) 600 mg(1,500mg) -400 unit cap Take  by mouth. Yes Historical Provider   fludrocortisone (FLORINEF) 0.1 mg tablet Take 1 Tab by mouth daily. 1/8/18  Yes Omer Sotelo MD   acetaminophen (TYLENOL) 500 mg tablet Take 1 Tab by mouth every six (6) hours as needed for Pain. Yes Lazaro Muro MD   polyethylene glycol (MIRALAX) 17 gram/dose powder Take 17 g by mouth daily. Yes Historical Provider   MULTIVITAMIN PO Take  by mouth. Yes Historical Provider   risperiDONE (RISPERDAL) 2 mg tablet 1 mg four (4) times daily. Yes Historical Provider   cholecalciferol, VITAMIN D3, (VITAMIN D3) 5,000 unit tab tablet Take  by mouth daily. Yes Historical Provider   Desvenlafaxine SR (PRISTIQ) 50 mg tablet Take 50 mg by mouth. Yes Historical Provider   ALPRAZolam Ashwini Narayan) 1 mg tablet Take 1 mg by mouth three (3) times daily as needed for Anxiety. Yes Historical Provider   Cetirizine (ZYRTEC) 10 mg cap Take  by mouth daily as needed. Indications: ALLERGIC RHINITIS   Yes Historical Provider   QUEtiapine (SEROQUEL) 300 mg tablet Take 600 mg by mouth nightly. Indications: DELUSIONS   Yes Historical Provider       Hospital Medications:  No current facility-administered medications for this encounter. Current Outpatient Prescriptions   Medication Sig    esomeprazole (NEXIUM) 40 mg capsule Take 1 cap by mouth twice daily  Indications: gastroesophageal reflux disease    levothyroxine (SYNTHROID) 200 mcg tablet Take 1 Tab by mouth Daily (before breakfast).     ofloxacin (FLOXIN) 0.3 % otic solution Administer 5 Drops in left ear two (2) times a day.  Calcium-Cholecalciferol, D3, (CALCIUM 600 WITH VITAMIN D3) 600 mg(1,500mg) -400 unit cap Take  by mouth.  fludrocortisone (FLORINEF) 0.1 mg tablet Take 1 Tab by mouth daily.  acetaminophen (TYLENOL) 500 mg tablet Take 1 Tab by mouth every six (6) hours as needed for Pain.  polyethylene glycol (MIRALAX) 17 gram/dose powder Take 17 g by mouth daily.  MULTIVITAMIN PO Take  by mouth.  risperiDONE (RISPERDAL) 2 mg tablet 1 mg four (4) times daily.  cholecalciferol, VITAMIN D3, (VITAMIN D3) 5,000 unit tab tablet Take  by mouth daily.  Desvenlafaxine SR (PRISTIQ) 50 mg tablet Take 50 mg by mouth.  ALPRAZolam (XANAX) 1 mg tablet Take 1 mg by mouth three (3) times daily as needed for Anxiety.  Cetirizine (ZYRTEC) 10 mg cap Take  by mouth daily as needed. Indications: ALLERGIC RHINITIS    QUEtiapine (SEROQUEL) 300 mg tablet Take 600 mg by mouth nightly. Indications: DELUSIONS       Social History:  Social History   Substance Use Topics    Smoking status: Never Smoker    Smokeless tobacco: Never Used    Alcohol use No         Family History:  Family History   Problem Relation Age of Onset    Diabetes Father     Hypertension Father     Gout Father     Arthritis-osteo Father     Osteoporosis Mother     Arthritis-osteo Mother     Allergic Rhinitis Mother        Review of Systems:  A detailed 10 system ROS is obtained, with pertinent positives as listed above. All others are negative. Diet:  NPO    Objective:     Physical Exam:  Vitals:  Visit Vitals    /89    Pulse 95    Temp 97.9 °F (36.6 °C)    Resp 16    SpO2 96%     Gen:  Pt is alert, cooperative, no acute distress  Skin:  Extremities and face reveal no rashes. HEENT: Sclerae anicteric. Extra-occular muscles are intact. No oral ulcers. No abnormal pigmentation of the lips. The neck is supple. Hard of hearing. Cardiovascular: Regular rate and rhythm.  No murmurs, gallops, or rubs. Respiratory:  Comfortable breathing with no accessory muscle use. Clear breath sounds anteriorly with no wheezes, rales, or rhonchi. GI:  Abdomen nondistended, soft, TTP in RUQ and Epigastric region. No rebound/guarding. Normal active bowel sounds. No enlargement of the liver or spleen. No masses palpable. Rectal:  Deferred  Musculoskeletal:  No pitting edema of the lower legs. Neurological:  Gross memory appears intact. Patient is alert and oriented. Psychiatric:  Mood appears appropriate with judgement intact. Lymphatic:  No cervical or supraclavicular adenopathy. Laboratory:    Recent Labs      06/09/18   1324  06/09/18   1237   WBC   --   12.1*   HGB   --   11.4*   HCT   --   34.2*   PLT   --   508*   MCV   --   83.6   NA   --   137   K   --   5.5*   CL   --   100   CO2   --   24   BUN   --   10   CREA   --   0.76   CA   --   8.7   GLU   --   107*   AP   --   975*   SGOT   --   403*   ALT   --   364*   TBILI   --   10.5*   ALB   --   2.3*   TP   --   8.4*   LPSE   --   42*   PTP  14.8*   --    INR  1.2   --       GES 5/31/18:   NUCLEAR MEDICINE SOLID PHASE GASTRIC EMPTYING EXAM.     HISTORY:  Abnormal liver enzymes and fatty liver.     Radiopharmaceutical: 1 mCi Tc 99m sulfur colloid standard egg meal.     FINDINGS: Following ingestion of radiopharmaceutical imaging was performed out  to 4 hours following the [facility] ([employer]) standard procedure.     Gastric retention is calculated at 72 % at 1 hour. Gastric retention is calculated at 35% at 4 hours.     Abnormal rapid emptying is less than 30% retention at one hour. Abnormal delayed emptying is greater than 10% retention 4 hours.     IMPRESSION  IMPRESSION: Moderately delayed gastric emptying.     Grade 1: Mild delayed emptying is less than 20% retention at 4 hours. Grade 2: Moderately emptying is 21-35% retention at 4 hours.   Grade 3: Severe delayed emptying is greater than 50% retention at 4 hours    --------------------------------------------------------------------------------------------------------------------------------  RUQ U/S 5/31/18:  FINDINGS: There is mildly heterogeneous and increased echogenicity throughout  the liver which can limit sensitivity for masses. There are no discrete lesions  in the visualized portions of the liver. Liver size is 18.5 cm, within normal  limits.     Main portal vein is patent on Doppler imaging.     There is no bile duct dilatation. The common bile duct diameter is 5 mm. The  gallbladder is partially contracted, and the patient reportedly had lunch today. No gallstones are seen. There is borderline nonspecific 3 mm gallbladder wall  thickening, likely due to contracted status. Sonographic Mccain's sign is not  seen.     There are no discrete lesions in the limited visualized portions of the  pancreas, mostly obscured by overlying bowel.      The right kidney measures 10.1 cm in length. Color Doppler demonstrates expected  right renal flow. There is no hydronephrosis. There is no evidence of a solid  renal mass.      There is no evidence of ascites.      The aorta and IVC are patent on Doppler imaging. Aortic diameter is within  normal limits.        IMPRESSION  IMPRESSION:   1. Mildly heterogeneous echogenic liver parenchyma is nonspecific, most often  steatosis. 2. No acute abnormality otherwise. Assessment:     Active Problems:    Jaundice (6/9/2018)    Elevated LFT    Patient is a 44 y.o. female with PMH of but not limited to GERD, anxiety, DM II, Prior Brain tumor with  shunt, parnoid schizophrenia, CVA,  Gastroparesis, who is seen in consultation at the request of Dr. Saira Reardon for Elevated LFT and Jaundice. She has had slowly rising LFT since admission on 5/31/18. RUQ U/S during prior admission with findings of fatty liver. Statins and Metformin have been stopped recently as possible causes of her LFT elevations.  Labs in the ED today with TB 10.5, , , , WBC 12.1, . Afebrile. Possible causes of her elevated LFT include biliary obstruction from stone or extrinsic compression and Medication induced hepatitis. Plan:   1. MRCP to evaluate the CBD  2. Follow LFT  3. Supportive care  4. Avoid Hepatotoxic medications  5. Check tylenol level. 6. Possible ERCP pending MRCP findings. 7. Clear liquid diet today    Thank you for this kind consultation. We will follow along with you. Dr. Tiffany Boyd to follow with further recommendations. Yunior Coe NP     ADDENDUM: Noted MRCP results below. Will start antibiotics and plan for ERCP on Monday with Dr. Joelle Scherer. Dr. Tiffany Boyd to discuss results of MRCP with patient and mother. Will start clears. IMPRESSION  IMPRESSION:  1. Findings highly suspicious for diffuse hepatic metastatic disease versus  multifocal cholangiocarcinoma. Image quality somewhat degraded by motion  artifact. Percutaneous tissue sampling may be helpful in further evaluation. Evidence of central biliary obstruction by tumor. 2.  Question small choledocholithiasis in the inferior common bile duct.

## 2018-06-12 PROBLEM — C18.9 METASTATIC COLON CANCER IN FEMALE (HCC): Status: ACTIVE | Noted: 2018-01-01

## 2018-06-12 PROBLEM — K31.84 GASTROPARESIS: Chronic | Status: ACTIVE | Noted: 2018-01-01

## 2018-06-12 PROBLEM — D50.0 IRON DEFICIENCY ANEMIA DUE TO CHRONIC BLOOD LOSS: Status: ACTIVE | Noted: 2018-01-01

## 2018-06-12 PROBLEM — D50.0 IRON DEFICIENCY ANEMIA DUE TO CHRONIC BLOOD LOSS: Chronic | Status: ACTIVE | Noted: 2018-01-01

## 2018-06-12 PROBLEM — K83.1 OBSTRUCTIVE JAUNDICE DUE TO CANCER (HCC): Status: ACTIVE | Noted: 2018-01-01

## 2018-06-12 PROBLEM — R50.9 FEVER: Status: RESOLVED | Noted: 2018-01-01 | Resolved: 2018-01-01

## 2018-06-12 PROBLEM — K75.89 CHOLESTATIC HEPATITIS: Status: ACTIVE | Noted: 2018-01-01

## 2018-06-12 PROBLEM — C80.1 OBSTRUCTIVE JAUNDICE DUE TO CANCER (HCC): Status: ACTIVE | Noted: 2018-01-01

## 2018-06-12 NOTE — PROGRESS NOTES
END OF SHIFT NOTE:    Intake/Output  06/11 1901 - 06/12 0700  In: -   Out: 600 [Urine:600]   Voiding: YES  Catheter: YES  Drain:              Stool:  0 occurrences. Emesis:  0 occurrences. VITAL SIGNS  Patient Vitals for the past 12 hrs:   Temp Pulse Resp BP SpO2   06/12/18 0413 97.3 °F (36.3 °C) 91 16 139/74 95 %   06/12/18 0030 98.3 °F (36.8 °C) 61 16 (!) 149/94 94 %   06/11/18 2000 98.4 °F (36.9 °C) 96 16 123/65 96 %   06/11/18 1949 - - - - 93 %       Pain Assessment  Pain 1  Pain Scale 1: Numeric (0 - 10) (06/12/18 0215)  Pain Intensity 1: 0 (06/12/18 0215)  Patient Stated Pain Goal: 0 (06/12/18 0215)  Pain Reassessment 1: Patient sleeping (06/11/18 1655)    Ambulating  Yes    Additional Information: Patient rested well. Given the first enema at 0600 this morning. Waiting for stool. Uneventful night. Shift report given to oncoming nurse at the bedside.     Ronny Buitrago

## 2018-06-12 NOTE — PROGRESS NOTES
Hospitalist Progress Note     Admit Date:  2018 12:25 PM   Name:  Anastacia Child   Age:  44 y.o.  :  1980   MRN:  [id number]   PCP:  Tala Kiran MD  Treatment Team: Attending Provider: Amelia Cervantes MD; Consulting Provider: Channing Gregory MD; Utilization Review: Judy Toribio RN; Student Nurse: Gladys Shell    Subjective:   43 yo female who presented with jaundice on a background of GERD, anxiety, DM II, Prior Brain tumor with  shunt, parnoid schizophrenia, CVA, and  Gastroparesis. She is a limited historian due to mental disability. Her mother reported that the patient had worsening jaundice. recently admitted in 2018 with transaminitis with EGD showing esophagitis and gastric erosion. US abdomen showed fatty liver and gastric emptying study was positive for delayed emptying. This admission, CT c/a/p showed findings suspicious for widely metastatic colon cancer. she was seen by GI and MRCP was ordered which was concerning for malignancy so ERCP was done 18 which showed stricture of common hepatic duct; sphincterotomy done and brushings sent for cytology. Gallbladder normal.   Colonoscopy done which showed friable, ulcerated mass in colon consisted with malignancy. Oncology was consulted, recommended palliative approach if biopsy confirmed cancer.  - pt denies pain, n/v.  Jaundice improving per parents. No CP, SOB.   Reports having BMs, some with blood      Objective:     Patient Vitals for the past 24 hrs:   Temp Pulse Resp BP SpO2   18 1537 98.1 °F (36.7 °C) 92 20 151/82 96 %   18 1221 97.4 °F (36.3 °C) 89 18 128/77 93 %   18 1157 - 88 18 156/82 97 %   18 1147 - 87 18 156/83 96 %   18 1137 - 85 18 155/87 99 %   18 1127 - 84 18 148/82 99 %   18 1120 - 76 16 146/80 97 %   18 1058 - 95 14 158/79 99 %   18 1043 - 94 16 156/78 97 %   18 0802 97.9 °F (36.6 °C) 90 16 133/73 -   18 0413 97.3 °F (36.3 °C) 91 16 139/74 95 %   06/12/18 0030 98.3 °F (36.8 °C) 61 16 (!) 149/94 94 %   06/11/18 2000 98.4 °F (36.9 °C) 96 16 123/65 96 %   06/11/18 1949 - - - - 93 %   06/11/18 1740 98.1 °F (36.7 °C) 95 15 (!) 145/93 96 %   06/11/18 1710 - 94 16 - 96 %   06/11/18 1700 - 84 16 155/90 97 %   06/11/18 1655 98.1 °F (36.7 °C) 85 16 148/70 98 %   06/11/18 1650 - 97 16 166/79 97 %   06/11/18 1645 - 99 16 147/87 96 %   06/11/18 1640 - (!) 103 16 146/60 96 %   06/11/18 1635 - (!) 103 16 149/66 95 %   06/11/18 1630 - (!) 107 16 144/65 95 %   06/11/18 1626 98.2 °F (36.8 °C) (!) 115 16 142/62 94 %     Oxygen Therapy  O2 Sat (%): 96 % (06/12/18 1537)  Pulse via Oximetry: 88 beats per minute (06/12/18 1157)  O2 Device: Room air (06/12/18 1157)  O2 Flow Rate (L/min): 2 l/min (06/12/18 1120)    Intake/Output Summary (Last 24 hours) at 06/12/18 1540  Last data filed at 06/12/18 1319   Gross per 24 hour   Intake              940 ml   Output             1050 ml   Net             -110 ml         General:    Well nourished. Alert. CV:   RRR. No murmur, rub, or gallop. Extremities: Warm and dry. No cyanosis or edema. Skin:     No rashes. jaundice. Neuro:  No gross focal deficits    Data Review:  I have reviewed all labs, meds, telemetry events, and studies from the last 24 hours. Recent Results (from the past 24 hour(s))   GLUCOSE, POC    Collection Time: 06/11/18  5:29 PM   Result Value Ref Range    Glucose (POC) 116 (H) 65 - 100 mg/dL   GLUCOSE, POC    Collection Time: 06/11/18  8:59 PM   Result Value Ref Range    Glucose (POC) 120 (H) 65 - 100 mg/dL   HEPATIC FUNCTION PANEL    Collection Time: 06/12/18  3:47 AM   Result Value Ref Range    Protein, total 6.8 6.3 - 8.2 g/dL    Albumin 1.9 (L) 3.5 - 5.0 g/dL    Globulin 4.9 (H) 2.3 - 3.5 g/dL    A-G Ratio 0.4 (L) 1.2 - 3.5      Bilirubin, total 6.8 (H) 0.2 - 1.1 MG/DL    Bilirubin, direct 6.0 (H) <0.4 MG/DL    Alk.  phosphatase 823 (H) 50 - 136 U/L    AST (SGOT) 179 (H) 15 - 37 U/L    ALT (SGPT) 250 (H) 12 - 65 U/L   CBC WITH AUTOMATED DIFF    Collection Time: 06/12/18  3:47 AM   Result Value Ref Range    WBC 9.1 4.3 - 11.1 K/uL    RBC 3.39 (L) 4.05 - 5.25 M/uL    HGB 9.3 (L) 11.7 - 15.4 g/dL    HCT 28.6 (L) 35.8 - 46.3 %    MCV 84.4 79.6 - 97.8 FL    MCH 27.4 26.1 - 32.9 PG    MCHC 32.5 31.4 - 35.0 g/dL    RDW 20.1 (H) 11.9 - 14.6 %    PLATELET 333 956 - 059 K/uL    MPV 10.4 (L) 10.8 - 14.1 FL    DF AUTOMATED      NEUTROPHILS 54 43 - 78 %    LYMPHOCYTES 32 13 - 44 %    MONOCYTES 7 4.0 - 12.0 %    EOSINOPHILS 4 0.5 - 7.8 %    BASOPHILS 1 0.0 - 2.0 %    IMMATURE GRANULOCYTES 2 0.0 - 5.0 %    ABS. NEUTROPHILS 5.0 1.7 - 8.2 K/UL    ABS. LYMPHOCYTES 2.9 0.5 - 4.6 K/UL    ABS. MONOCYTES 0.6 0.1 - 1.3 K/UL    ABS. EOSINOPHILS 0.4 0.0 - 0.8 K/UL    ABS. BASOPHILS 0.1 0.0 - 0.2 K/UL    ABS. IMM.  GRANS. 0.2 0.0 - 0.5 K/UL   METABOLIC PANEL, BASIC    Collection Time: 06/12/18  3:47 AM   Result Value Ref Range    Sodium 142 136 - 145 mmol/L    Potassium 3.3 (L) 3.5 - 5.1 mmol/L    Chloride 104 98 - 107 mmol/L    CO2 26 21 - 32 mmol/L    Anion gap 12 7 - 16 mmol/L    Glucose 103 (H) 65 - 100 mg/dL    BUN 8 6 - 23 MG/DL    Creatinine 0.59 (L) 0.6 - 1.0 MG/DL    GFR est AA >60 >60 ml/min/1.73m2    GFR est non-AA >60 >60 ml/min/1.73m2    Calcium 8.7 8.3 - 10.4 MG/DL   GLUCOSE, POC    Collection Time: 06/12/18  7:42 AM   Result Value Ref Range    Glucose (POC) 109 (H) 65 - 100 mg/dL   GLUCOSE, POC    Collection Time: 06/12/18 12:24 PM   Result Value Ref Range    Glucose (POC) 104 (H) 65 - 100 mg/dL        All Micro Results     Procedure Component Value Units Date/Time    CULTURE, BLOOD [[id number]] Collected:  06/10/18 2310    Order Status:  Completed Specimen:  Blood from Blood Updated:  06/12/18 1013     Special Requests: PURPLE PICC        Culture result: NO GROWTH 1 DAY       CULTURE, URINE [[id number]] Collected:  06/11/18 0406    Order Status:  Completed Specimen:  Urine from Cox Specimen Updated:  06/12/18 0768 Special Requests: NO SPECIAL REQUESTS        Culture result: NO GROWTH 1 DAY             Current Meds:  Current Facility-Administered Medications   Medication Dose Route Frequency    lactated Ringers infusion  100 mL/hr IntraVENous CONTINUOUS    famotidine (PF) (PEPCID) 20 mg in sodium chloride 0.9% 10 mL injection  20 mg IntraVENous ONCE PRN    famotidine (PEPCID) tablet 20 mg  20 mg Oral PRN    polyethylene glycol (MIRALAX) packet 17 g  17 g Oral Q12H    potassium chloride (K-DUR, KLOR-CON) SR tablet 40 mEq  40 mEq Oral NOW    ferric gluconate (FERRLECIT) 125 mg in 0.9% sodium chloride 100 mL ivpb  125 mg IntraVENous Q7D    sodium chloride (NS) flush 20 mL  20 mL InterCATHeter Q8H    heparin (porcine) pf 600 Units  600 Units InterCATHeter Q8H    sodium chloride (NS) flush 20 mL  20 mL InterCATHeter PRN    heparin (porcine) pf 600 Units  600 Units InterCATHeter PRN    acetaminophen (TYLENOL) tablet 650 mg  650 mg Oral Q6H PRN    ALPRAZolam (XANAX) tablet 1 mg  1 mg Oral TID PRN    calcium-vitamin D (OS-ESTRADA) 500 mg-200 unit tablet  1 Tab Oral DAILY    desvenlafaxine succinate (PRISTIQ) ER tablet 50 mg (Patient Supplied)  50 mg Oral DAILY    pantoprazole (PROTONIX) tablet 40 mg  40 mg Oral ACB&D    fludrocortisone (FLORINEF) tablet 100 mcg  0.1 mg Oral DAILY    levothyroxine (SYNTHROID) tablet 200 mcg  200 mcg Oral ACB    QUEtiapine (SEROquel) tablet 600 mg  600 mg Oral QHS    risperiDONE (RisperDAL) tablet 1 mg  1 mg Oral QID    sodium chloride (NS) flush 5-10 mL  5-10 mL IntraVENous Q8H    sodium chloride (NS) flush 5-10 mL  5-10 mL IntraVENous PRN    insulin lispro (HUMALOG) injection   SubCUTAneous AC&HS    heparin (porcine) injection 5,000 Units  5,000 Units SubCUTAneous Q12H       Diet:  DIET FULL LIQUID    Other Studies (last 24 hours):  Xr Ercp / Ercb Combined    Result Date: 6/11/2018  ERCP. HISTORY: Abdominal pain and possible biliary tree stones.  TECHNIQUE: An endoscope is in place and the common bile duct has been cannulated. Contrast has been injected. FINDINGS: The common bile duct is not dilated. The gallbladder unremarkable. Biliary stent was placed. There is one area of narrowing along the upper common bile duct. See endoscopist report for further details. Fluoroscopy time: 3.0 minutes. Static images: 7. IMPRESSION: Common bile duct stricture with biliary stent placement.        Assessment and Plan:     Hospital Problems as of 6/12/2018  Date Reviewed: 6/12/2018          Codes Class Noted - Resolved POA    Metastatic colon cancer in female [facility]) ICD-10-CM: C78.5  ICD-9-CM: 197.5  6/10/2018 - Present Yes        Elevated CEA ICD-10-CM: R97.0  ICD-9-CM: 795.81  6/10/2018 - Present Yes        Cholestatic hepatitis ICD-10-CM: K75.89  ICD-9-CM: 573.8  6/10/2018 - Present Yes        * (Principal)Obstructive jaundice due to cancer [facility]) ICD-10-CM: K83.1, C80.1  ICD-9-CM: 576.8  6/9/2018 - Present Yes        Gastroparesis (Chronic) ICD-10-CM: K31.84  ICD-9-CM: 536.3  6/1/2018 - Present Yes        Cognitive developmental delay (Chronic) ICD-10-CM: F81.9  ICD-9-CM: 315.9  5/31/2018 - Present Yes        Iron deficiency anemia due to chronic blood loss (Chronic) ICD-10-CM: D50.0  ICD-9-CM: 280.0  5/31/2018 - Present Yes         (ventriculoperitoneal) shunt status (Chronic) ICD-10-CM: Z98.2  ICD-9-CM: V45.2  Unknown - Present Yes    Overview Signed 3/21/2017  3:57 PM by Lui Goodwin MD     not functioning based on 2016 shunt xray series             Paranoid schizophrenia ([facility] (Chronic) ICD-10-CM: F20.0  ICD-9-CM: 295.30  Unknown - Present Yes        Intellectual disability (Chronic) ICD-10-CM: F79  ICD-9-CM: 551  Unknown - Present Yes        Hyperlipidemia (Chronic) ICD-10-CM: E78.5  ICD-9-CM: 272.4  9/22/2016 - Present Yes        Hypothyroidism (Chronic) ICD-10-CM: E03.9  ICD-9-CM: 244.9  10/13/2014 - Present Yes        Depression (Chronic) ICD-10-CM: F32.9  ICD-9-CM: 400  10/13/2014 - Present Yes        GERD (gastroesophageal reflux disease) (Chronic) ICD-10-CM: K21.9  ICD-9-CM: 530.81  10/13/2014 - Present Yes        RESOLVED: Fever ICD-10-CM: R50.9  ICD-9-CM: 780.60  6/11/2018 - 6/12/2018 No              PLAN:    · Obstructive Jaundice -   likely from cancer. LFTs improving after biliary sphincterotomy. No NSAIDs fo 2 weeks. Cytology pending. · Metastatic colon cancer with constipation - GI, oncology following. BID miralax. Diet per GI  · HONEY - probably from above. Cont iron. · HypoK - replace. Check Mg  · Fever - none now. UA not supportive of UTI. No PNA on CT. Gallbladder normal on studies. doubt infection at this time. Stop zosyn and observe. · Hypothyroidism - Continue Synthyoid  · Cognitive developmental delay - Continue Risperdal, Seroquel  · Hyperlipidemia - Statin stopped due to abnormal LFTs    DC planning/Dispo:  Depending on goals of care.   Diet:  DIET FULL LIQUID  DVT ppx:  heparin      Signed:  Renzo Lopez MD

## 2018-06-12 NOTE — PROGRESS NOTES
END OF SHIFT NOTE:  Patient return to room following ERCP tolerating clear liquids well. Patient's parent present @ bedside. Afebrile. Intake/Output      Voiding: yes   Catheter: yes   Drain:              Stool:  None reported  occurrences. Emesis:  No  occurrences. VITAL SIGNS  Patient Vitals for the past 12 hrs:   Temp Pulse Resp BP SpO2   06/11/18 1949 - - - - 93 %   06/11/18 1740 98.1 °F (36.7 °C) 95 15 (!) 145/93 96 %   06/11/18 1710 - 94 16 - 96 %   06/11/18 1700 - 84 16 155/90 97 %   06/11/18 1655 98.1 °F (36.7 °C) 85 16 148/70 98 %   06/11/18 1650 - 97 16 166/79 97 %   06/11/18 1645 - 99 16 147/87 96 %   06/11/18 1640 - (!) 103 16 146/60 96 %   06/11/18 1635 - (!) 103 16 149/66 95 %   06/11/18 1630 - (!) 107 16 144/65 95 %   06/11/18 1626 98.2 °F (36.8 °C) (!) 115 16 142/62 94 %   06/11/18 1342 - 94 18 (!) 160/102 96 %   06/11/18 1123 98.5 °F (36.9 °C) 85 20 141/87 97 %       Pain Assessment  Pain 1  Pain Scale 1: Numeric (0 - 10) (06/11/18 1831)  Pain Intensity 1: 0 (06/11/18 1831)  Patient Stated Pain Goal: 0 (06/11/18 1342)  Pain Reassessment 1: Patient sleeping (06/11/18 1655)    Ambulating  In room     Additional Information:  See above     Shift report given to oncoming nurse  Rebecca Pagan RN  at the bedside.     Brianna Guevara RN

## 2018-06-12 NOTE — PROCEDURES
Colonoscopy Procedure Note    PreOp Diagnosis:   Sigmoid stricture  Abnormal CT  Constipation  HONEY    PostOp Diagnosis:  Sigmoid colon mass    Medications:  Monitored Anesthesia    Procedure:  Sigmoidoscopy   Instrument: P  AL  After informed consent was obtained, the patient was sedated and the colonoscope was inserted  in the anus and advanced into the cecum without difficulty. The scope was slowly withdrawn while the mucosa was carefully inspected, including a retroflexed view of the rectum. Prep: adequate    Findings:     Colon:  The scope was advanced to 19 cm from the anal verge. A circumferential mass was identified. The lumen is very small, and the Pediatric colonoscope was unable to pass through the lesion. The more proximal colon was not visualized. The mass is friable, ulcerated, consistent with malignancy. Multiple biopsies obtained.   Rectum: Limited views normal- poor prep    Recommendations:  Oncology evaluation  Check CEA levels  Depending on treatment options, she may require surgical diversion in the near future  B.i.d. MiraLax and full liquid diet in the meantime    Samir Hernandez MD

## 2018-06-12 NOTE — ANESTHESIA PREPROCEDURE EVALUATION
Anesthetic History   No history of anesthetic complications            Review of Systems / Medical History  Patient summary reviewed and pertinent labs reviewed    Pulmonary  Within defined limits                 Neuro/Psych         TIA (10/14 on bASA since then) and psychiatric history     Cardiovascular  Within defined limits                Exercise tolerance: >4 METS     GI/Hepatic/Renal     GERD: well controlled           Endo/Other      Hypothyroidism: well controlled  Obesity and cancer (brain)     Other Findings   Comments: Paranoid delusional disorder  Depression  Brain tumor--left eye closed           Physical Exam    Airway  Mallampati: II  TM Distance: 4 - 6 cm  Neck ROM: normal range of motion   Mouth opening: Normal     Cardiovascular    Rhythm: regular  Rate: normal         Dental  No notable dental hx       Pulmonary  Breath sounds clear to auscultation               Abdominal  GI exam deferred       Other Findings            Anesthetic Plan    ASA: 3  Anesthesia type: total IV anesthesia          Induction: Intravenous  Anesthetic plan and risks discussed with: Patient and Mother

## 2018-06-12 NOTE — INTERVAL H&P NOTE
H&P Update:  Blaire Natarajan was seen and examined. History and physical has been reviewed. The patient has been examined.  There have been no significant clinical changes since the completion of the originally dated History and Physical.    Signed By: Lorne Preston MD     June 12, 2018 10:35 AM

## 2018-06-12 NOTE — ROUTINE PROCESS
TRANSFER - OUT REPORT:    Verbal report given to Ysabel Padilla RN on [employer]  being transferred to [address] [place] [address] for routine progression of care       Report consisted of patients Situation, Background, Assessment and   Recommendations(SBAR). Information from the following report(s) SBAR was reviewed with the receiving nurse. Lines:   PICC Double Lumen 14/12/55 Right;Basilic (Active)   Central Line Being Utilized Yes 6/12/2018  2:15 AM   Criteria for Appropriate Use Limited/no vessel suitable for conventional peripheral access 6/12/2018  2:15 AM   Site Assessment Clean, dry, & intact 6/12/2018  2:15 AM   Phlebitis Assessment 0 6/12/2018  2:15 AM   Infiltration Assessment 0 6/12/2018  2:15 AM   Arm Circumference (cm) 33 cm 6/10/2018  2:50 PM   Date of Last Dressing Change 06/10/18 6/12/2018  2:15 AM   Dressing Status Clean, dry, & intact 6/12/2018  2:15 AM   Action Taken Blood drawn 6/12/2018  3:45 AM   External Catheter Length (cm) 3 centimeters 6/10/2018  2:50 PM   Dressing Type Transparent;Disk with Chlorhexadine gluconate (CHG) 6/12/2018  2:15 AM   Hub Color/Line Status Purple;Patent; Flushed 6/12/2018  3:45 AM   Positive Blood Return (Site #1) Yes 6/12/2018  3:45 AM   Hub Color/Line Status Red;Patent 6/12/2018  2:15 AM   Positive Blood Return (Site #2) Yes 6/12/2018  2:15 AM   Alcohol Cap Used No 6/12/2018  2:15 AM        Opportunity for questions and clarification was provided.       Patient transported with:   [employer]

## 2018-06-12 NOTE — PROGRESS NOTES
TRANSFER - IN REPORT:    Verbal report received from  Anna Tanner RN  on [employer]  being received from  Endoscopy  for ordered procedure      Report consisted of patients Situation, Background, Assessment and   Recommendations(SBAR). Information from the following report(s) SBAR and Kardex was reviewed with the receiving nurse. Opportunity for questions and clarification was provided. Assessment completed upon patients arrival to unit and care assumed.

## 2018-06-12 NOTE — ANESTHESIA POSTPROCEDURE EVALUATION
Post-Anesthesia Evaluation and Assessment    Patient: Suraj Eugene MRN: [id number]  SSN: [id number]    YOB: 1980  Age: 44 y.o. Sex: female       Cardiovascular Function/Vital Signs  Visit Vitals    /87    Pulse 85    Temp 36.6 °C (97.9 °F)    Resp 18    Wt 72.8 kg (160 lb 6.4 oz)    SpO2 99%    Breastfeeding No    BMI 30.31 kg/m2       Patient is status post total IV anesthesia anesthesia for Procedure(s):  SIGMOIDOSCOPY FLEXIBLE / BMI= 35 / ROOM 515  COLON BIOPSY. Nausea/Vomiting: None    Postoperative hydration reviewed and adequate. Pain:  Pain Scale 1: Numeric (0 - 10) (06/12/18 1137)  Pain Intensity 1: 0 (06/12/18 1137)   Managed    Neurological Status:   Neuro (WDL): Exceptions to WDL (06/11/18 1655)  Neuro  Neurologic State: Alert (06/11/18 2000)  Orientation Level: Oriented X4 (06/11/18 2000)  Cognition: Follows commands (06/11/18 2000)   At baseline    Mental Status and Level of Consciousness: Arousable    Pulmonary Status:   O2 Device: Room air (06/12/18 1137)   Adequate oxygenation and airway patent    Complications related to anesthesia: None    Post-anesthesia assessment completed.  No concerns    Signed By: Robert Peters MD     June 12, 2018

## 2018-06-13 NOTE — PROGRESS NOTES
[facility] Progress Note      Admit Date: 6/9/2018    CC: Metastatic colon cancer    Subjective:     Patient feeling well. No complaints of pain. Wants regular food. No BM since flex sig. No N/V. Medications:  Current Facility-Administered Medications   Medication Dose Route Frequency    lactated Ringers infusion  100 mL/hr IntraVENous CONTINUOUS    famotidine (PF) (PEPCID) 20 mg in sodium chloride 0.9% 10 mL injection  20 mg IntraVENous ONCE PRN    famotidine (PEPCID) tablet 20 mg  20 mg Oral PRN    polyethylene glycol (MIRALAX) packet 17 g  17 g Oral Q12H    ferric gluconate (FERRLECIT) 125 mg in 0.9% sodium chloride 100 mL ivpb  125 mg IntraVENous Q7D    sodium chloride (NS) flush 20 mL  20 mL InterCATHeter Q8H    heparin (porcine) pf 600 Units  600 Units InterCATHeter Q8H    sodium chloride (NS) flush 20 mL  20 mL InterCATHeter PRN    heparin (porcine) pf 600 Units  600 Units InterCATHeter PRN    acetaminophen (TYLENOL) tablet 650 mg  650 mg Oral Q6H PRN    ALPRAZolam (XANAX) tablet 1 mg  1 mg Oral TID PRN    calcium-vitamin D (OS-ESTRADA) 500 mg-200 unit tablet  1 Tab Oral DAILY    desvenlafaxine succinate (PRISTIQ) ER tablet 50 mg (Patient Supplied)  50 mg Oral DAILY    pantoprazole (PROTONIX) tablet 40 mg  40 mg Oral ACB&D    fludrocortisone (FLORINEF) tablet 100 mcg  0.1 mg Oral DAILY    levothyroxine (SYNTHROID) tablet 200 mcg  200 mcg Oral ACB    QUEtiapine (SEROquel) tablet 600 mg  600 mg Oral QHS    risperiDONE (RisperDAL) tablet 1 mg  1 mg Oral QID    sodium chloride (NS) flush 5-10 mL  5-10 mL IntraVENous Q8H    sodium chloride (NS) flush 5-10 mL  5-10 mL IntraVENous PRN    insulin lispro (HUMALOG) injection   SubCUTAneous AC&HS    heparin (porcine) injection 5,000 Units  5,000 Units SubCUTAneous Q12H       ROS: Otherwise negative in 10 systems except as noted above in Subjective.     Objective:   Vitals:  Visit Vitals    /64 (BP 1 Location: Left arm)  Pulse 98    Temp 98.2 °F (36.8 °C)    Resp 18    Wt 73.2 kg (161 lb 4.8 oz)    SpO2 93%    Breastfeeding No    BMI 30.48 kg/m2       Intake/Output:     06/11 1901 - 06/13 0700  In: 540 [P.O.:440; I.V.:100]  Out: 1750 [Urine:1750]    Exam:  General appearance: alert, no distress  Lungs: clear to auscultation bilaterally  Heart: regular rate and rhythm  Abdomen: soft, non-distended, non-tender. Extremities: no cyanosis + edema    Data Review (Labs):    Recent Results (from the past 24 hour(s))   GLUCOSE, POC    Collection Time: 06/12/18 12:24 PM   Result Value Ref Range    Glucose (POC) 104 (H) 65 - 100 mg/dL   GLUCOSE, POC    Collection Time: 06/12/18  4:12 PM   Result Value Ref Range    Glucose (POC) 97 65 - 100 mg/dL   GLUCOSE, POC    Collection Time: 06/12/18  9:14 PM   Result Value Ref Range    Glucose (POC) 125 (H) 65 - 100 mg/dL   HEPATIC FUNCTION PANEL    Collection Time: 06/13/18  3:49 AM   Result Value Ref Range    Protein, total 7.0 6.3 - 8.2 g/dL    Albumin 2.0 (L) 3.5 - 5.0 g/dL    Globulin 5.0 (H) 2.3 - 3.5 g/dL    A-G Ratio 0.4 (L) 1.2 - 3.5      Bilirubin, total 5.4 (H) 0.2 - 1.1 MG/DL    Bilirubin, direct 4.7 (H) <0.4 MG/DL    Alk.  phosphatase 737 (H) 50 - 136 U/L    AST (SGOT) 142 (H) 15 - 37 U/L    ALT (SGPT) 213 (H) 12 - 65 U/L   METABOLIC PANEL, BASIC    Collection Time: 06/13/18  3:49 AM   Result Value Ref Range    Sodium 140 136 - 145 mmol/L    Potassium 3.9 3.5 - 5.1 mmol/L    Chloride 103 98 - 107 mmol/L    CO2 27 21 - 32 mmol/L    Anion gap 10 7 - 16 mmol/L    Glucose 95 65 - 100 mg/dL    BUN 10 6 - 23 MG/DL    Creatinine 0.59 (L) 0.6 - 1.0 MG/DL    GFR est AA >60 >60 ml/min/1.73m2    GFR est non-AA >60 >60 ml/min/1.73m2    Calcium 8.6 8.3 - 10.4 MG/DL   MAGNESIUM    Collection Time: 06/13/18  3:49 AM   Result Value Ref Range    Magnesium 1.9 1.8 - 2.4 mg/dL   CBC W/O DIFF    Collection Time: 06/13/18  3:49 AM   Result Value Ref Range    WBC 8.6 4.3 - 11.1 K/uL    RBC 3.23 (L) 4.05 - 5.25 M/uL    HGB 8.8 (L) 11.7 - 15.4 g/dL    HCT 27.4 (L) 35.8 - 46.3 %    MCV 84.8 79.6 - 97.8 FL    MCH 27.2 26.1 - 32.9 PG    MCHC 32.1 31.4 - 35.0 g/dL    RDW 20.4 (H) 11.9 - 14.6 %    PLATELET 374 413 - 148 K/uL    MPV 10.5 (L) 10.8 - 14.1 FL   GLUCOSE, POC    Collection Time: 06/13/18  7:16 AM   Result Value Ref Range    Glucose (POC) 106 (H) 65 - 100 mg/dL     Flex Sig : friable mass at 19cm, nearly obstructing. Bx: + adenocarcinoma. Assessment:     Principal Problem:    [facility] (6/9/2018)    Active Problems:    Hypothyroidism (10/13/2014)      Depression (10/13/2014)      GERD (gastroesophageal reflux disease) (10/13/2014)      Hyperlipidemia (9/22/2016)      Intellectual disability ()      Paranoid schizophrenia ([facility] [address] ()       (ventriculoperitoneal) shunt status ()      Overview: not functioning based on 2016 shunt xray series      Cognitive developmental delay (5/31/2018)      Iron deficiency anemia due to chronic blood loss (5/31/2018)      Gastroparesis (6/1/2018)      Metastatic colon cancer in female [facility]) (6/10/2018)      Elevated CEA (6/10/2018)      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 Metastatic sigmoid colon cancer- confirmed on biopsy. Discussed with pt and mother. 2. Per mom, considering comfort measures only  3. Discussed the implications of impending bowel obstruction. Diverting ostomy vs palliative colon stent placement. 4. Unclear if they will choose palliative chemo or XRT- awaiting Oncology eval  5. If a stent is desired, could be placed tomorrow and DC home after  6. Higher risk of complications from stent placement if pt also having adjuvant chemo/xrt  7. LFTs slightly improved. Unlikely to normalize with extensive tumor in liver  8. Cont miralax bid. Consider low residue diet if palliative approach is taken.      Yahir Silver MD  [facility]

## 2018-06-13 NOTE — PROGRESS NOTES
Hospitalist Progress Note     Admit Date:  2018 12:25 PM   Name:  Neida Espinosa   Age:  44 y.o.  :  1980   MRN:  [id number]   PCP:  Gualberto Mancia MD  Treatment Team: Attending Provider: Dixon Samuels MD; Consulting Provider: Leopoldo Darner, MD; Utilization Review: Derick Sky RN; Student Nurse: Jamar Young    Subjective:   43 yo female who presented with jaundice on a background of GERD, anxiety, DM II, Prior Brain tumor with  shunt, parnoid schizophrenia, CVA, and  Gastroparesis. She is a limited historian due to mental disability. Her mother reported that the patient had worsening jaundice. recently admitted in 2018 with transaminitis with EGD showing esophagitis and gastric erosion. US abdomen showed fatty liver and gastric emptying study was positive for delayed emptying. This admission, CT c/a/p showed findings suspicious for widely metastatic colon cancer. she was seen by GI and MRCP was ordered which was concerning for malignancy so ERCP was done 18 which showed stricture of common hepatic duct; sphincterotomy done and brushings sent for cytology. Gallbladder normal.   Colonoscopy done which showed friable, ulcerated mass in colon consisted with malignancy. Oncology was consulted, recommended palliative approach if biopsy confirmed cancer.  - pt denies pain, n/v.  Jaundice improving per parents. No CP, SOB. Reports having BMs, some with blood     - pt eating, doesn't like liquid diet. No abd pain, n/v. No BM yet.       Objective:     Patient Vitals for the past 24 hrs:   Temp Pulse Resp BP SpO2   18 1045 98.3 °F (36.8 °C) 89 18 131/72 100 %   18 0650 98.2 °F (36.8 °C) 98 18 123/64 93 %   18 0330 98.3 °F (36.8 °C) 95 18 147/77 94 %   18 0032 98.4 °F (36.9 °C) 98 18 149/78 93 %   18 1945 98 °F (36.7 °C) 96 18 161/83 96 %   18 1537 - - - 151/82 -   18 1527 98.2 °F (36.8 °C) 92 20 94/70 96 %   18 1221 97.4 °F (36.3 °C) 89 18 128/77 93 %     Oxygen Therapy  O2 Sat (%): 100 % (06/13/18 1045)  Pulse via Oximetry: 88 beats per minute (06/12/18 1157)  O2 Device: Room air (06/13/18 0806)  O2 Flow Rate (L/min): 2 l/min (06/12/18 1120)    Intake/Output Summary (Last 24 hours) at 06/13/18 1206  Last data filed at 06/13/18 0925   Gross per 24 hour   Intake              680 ml   Output             1350 ml   Net             -670 ml         General:    Well nourished. Alert. CV:   RRR. No murmur, rub, or gallop. Extremities: Warm and dry. No cyanosis or edema. Skin:     No rashes. jaundice. Neuro:  No gross focal deficits    Data Review:  I have reviewed all labs, meds, telemetry events, and studies from the last 24 hours. Recent Results (from the past 24 hour(s))   GLUCOSE, POC    Collection Time: 06/12/18 12:24 PM   Result Value Ref Range    Glucose (POC) 104 (H) 65 - 100 mg/dL   GLUCOSE, POC    Collection Time: 06/12/18  4:12 PM   Result Value Ref Range    Glucose (POC) 97 65 - 100 mg/dL   GLUCOSE, POC    Collection Time: 06/12/18  9:14 PM   Result Value Ref Range    Glucose (POC) 125 (H) 65 - 100 mg/dL   HEPATIC FUNCTION PANEL    Collection Time: 06/13/18  3:49 AM   Result Value Ref Range    Protein, total 7.0 6.3 - 8.2 g/dL    Albumin 2.0 (L) 3.5 - 5.0 g/dL    Globulin 5.0 (H) 2.3 - 3.5 g/dL    A-G Ratio 0.4 (L) 1.2 - 3.5      Bilirubin, total 5.4 (H) 0.2 - 1.1 MG/DL    Bilirubin, direct 4.7 (H) <0.4 MG/DL    Alk.  phosphatase 737 (H) 50 - 136 U/L    AST (SGOT) 142 (H) 15 - 37 U/L    ALT (SGPT) 213 (H) 12 - 65 U/L   METABOLIC PANEL, BASIC    Collection Time: 06/13/18  3:49 AM   Result Value Ref Range    Sodium 140 136 - 145 mmol/L    Potassium 3.9 3.5 - 5.1 mmol/L    Chloride 103 98 - 107 mmol/L    CO2 27 21 - 32 mmol/L    Anion gap 10 7 - 16 mmol/L    Glucose 95 65 - 100 mg/dL    BUN 10 6 - 23 MG/DL    Creatinine 0.59 (L) 0.6 - 1.0 MG/DL    GFR est AA >60 >60 ml/min/1.73m2    GFR est non-AA >60 >60 ml/min/1.73m2    Calcium 8.6 8.3 - 10.4 MG/DL   MAGNESIUM    Collection Time: 06/13/18  3:49 AM   Result Value Ref Range    Magnesium 1.9 1.8 - 2.4 mg/dL   CBC W/O DIFF    Collection Time: 06/13/18  3:49 AM   Result Value Ref Range    WBC 8.6 4.3 - 11.1 K/uL    RBC 3.23 (L) 4.05 - 5.25 M/uL    HGB 8.8 (L) 11.7 - 15.4 g/dL    HCT 27.4 (L) 35.8 - 46.3 %    MCV 84.8 79.6 - 97.8 FL    MCH 27.2 26.1 - 32.9 PG    MCHC 32.1 31.4 - 35.0 g/dL    RDW 20.4 (H) 11.9 - 14.6 %    PLATELET 276 015 - 157 K/uL    MPV 10.5 (L) 10.8 - 14.1 FL   GLUCOSE, POC    Collection Time: 06/13/18  7:16 AM   Result Value Ref Range    Glucose (POC) 106 (H) 65 - 100 mg/dL   GLUCOSE, POC    Collection Time: 06/13/18 10:47 AM   Result Value Ref Range    Glucose (POC) 112 (H) 65 - 100 mg/dL        All Micro Results     Procedure Component Value Units Date/Time    CULTURE, BLOOD [[id number]] Collected:  06/10/18 2310    Order Status:  Completed Specimen:  Blood from Blood Updated:  06/13/18 1028     Special Requests: PURPLE PICC        Culture result: NO GROWTH 2 DAYS       CULTURE, URINE [[id number]] Collected:  06/11/18 0406    Order Status:  Completed Specimen:  Urine from Cox Specimen Updated:  06/13/18 0708     Special Requests: NO SPECIAL REQUESTS        Culture result: NO GROWTH 2 DAYS             Current Meds:  Current Facility-Administered Medications   Medication Dose Route Frequency    famotidine (PEPCID) tablet 20 mg  20 mg Oral PRN    polyethylene glycol (MIRALAX) packet 17 g  17 g Oral Q12H    ferric gluconate (FERRLECIT) 125 mg in 0.9% sodium chloride 100 mL ivpb  125 mg IntraVENous Q7D    sodium chloride (NS) flush 20 mL  20 mL InterCATHeter Q8H    heparin (porcine) pf 600 Units  600 Units InterCATHeter Q8H    sodium chloride (NS) flush 20 mL  20 mL InterCATHeter PRN    heparin (porcine) pf 600 Units  600 Units InterCATHeter PRN    acetaminophen (TYLENOL) tablet 650 mg  650 mg Oral Q6H PRN    ALPRAZolam (XANAX) tablet 1 mg  1 mg Oral TID PRN    calcium-vitamin D (OS-ESTRADA) 500 mg-200 unit tablet  1 Tab Oral DAILY    desvenlafaxine succinate (PRISTIQ) ER tablet 50 mg (Patient Supplied)  50 mg Oral DAILY    pantoprazole (PROTONIX) tablet 40 mg  40 mg Oral ACB&D    fludrocortisone (FLORINEF) tablet 100 mcg  0.1 mg Oral DAILY    levothyroxine (SYNTHROID) tablet 200 mcg  200 mcg Oral ACB    QUEtiapine (SEROquel) tablet 600 mg  600 mg Oral QHS    risperiDONE (RisperDAL) tablet 1 mg  1 mg Oral QID    sodium chloride (NS) flush 5-10 mL  5-10 mL IntraVENous Q8H    sodium chloride (NS) flush 5-10 mL  5-10 mL IntraVENous PRN    insulin lispro (HUMALOG) injection   SubCUTAneous AC&HS    heparin (porcine) injection 5,000 Units  5,000 Units SubCUTAneous Q12H       Diet:  DIET FULL LIQUID    Other Studies (last 24 hours):  No results found.     Assessment and Plan:     Hospital Problems as of 6/13/2018  Date Reviewed: 6/12/2018          Codes Class Noted - Resolved POA    * (Principal)Metastatic colon cancer in female [facility]) ICD-10-CM: C78.5  ICD-9-CM: 197.5  6/10/2018 - Present Yes        Elevated CEA ICD-10-CM: R97.0  ICD-9-CM: 795.81  6/10/2018 - Present Yes        Cholestatic hepatitis ICD-10-CM: K75.89  ICD-9-CM: 573.8  6/10/2018 - Present Yes        Obstructive jaundice due to cancer [facility]) ICD-10-CM: K83.1, C80.1  ICD-9-CM: 576.8  6/9/2018 - Present Yes        Gastroparesis (Chronic) ICD-10-CM: K31.84  ICD-9-CM: 536.3  6/1/2018 - Present Yes        Cognitive developmental delay (Chronic) ICD-10-CM: F81.9  ICD-9-CM: 315.9  5/31/2018 - Present Yes        Iron deficiency anemia due to chronic blood loss (Chronic) ICD-10-CM: D50.0  ICD-9-CM: 280.0  5/31/2018 - Present Yes         (ventriculoperitoneal) shunt status (Chronic) ICD-10-CM: Z98.2  ICD-9-CM: V45.2  Unknown - Present Yes    Overview Signed 3/21/2017  3:57 PM by Fredi Boudreaux MD     not functioning based on 2016 shunt xray series             Paranoid schizophrenia ([facility] [address] (Chronic) ICD-10-CM: F20.0  ICD-9-CM: 295.30  Unknown - Present Yes        Intellectual disability (Chronic) ICD-10-CM: F79  ICD-9-CM: 584  Unknown - Present Yes        Hyperlipidemia (Chronic) ICD-10-CM: E78.5  ICD-9-CM: 272.4  9/22/2016 - Present Yes        Hypothyroidism (Chronic) ICD-10-CM: E03.9  ICD-9-CM: 244.9  10/13/2014 - Present Yes        Depression (Chronic) ICD-10-CM: F32.9  ICD-9-CM: 283  10/13/2014 - Present Yes        GERD (gastroesophageal reflux disease) (Chronic) ICD-10-CM: K21.9  ICD-9-CM: 530.81  10/13/2014 - Present Yes        RESOLVED: Fever ICD-10-CM: R50.9  ICD-9-CM: 780.60  6/11/2018 - 6/12/2018 No              PLAN:    · Metastatic colon cancer with constipation - Discussed with oncology; no plans for any kind of medical oncology or XRT treatment. GI  following. BID miralax. Diet per GI. If colon stent desired, could be done tomorrow and home after per GI note. · Cox Status - unclear why it was placed. No renal failure this admit. DC and voiding trial.  · Obstructive Jaundice -   from cancer. LFTs improving after biliary sphincterotomy. No NSAIDs fo 2 weeks. · HONEY - probably from above. IV iron weekly; defer to GI/heme  · HypoK - resolved. .  Mg wnl  · Fever - resolved. Holding abx. UA not supportive of UTI. No PNA on CT. Gallbladder normal on studies. doubt infection at this time.    · Hypothyroidism - Continue Synthroid  · Cognitive developmental delay - Continue Risperdal, Seroquel  · Hyperlipidemia - Statin stopped due to abnormal LFTs    DC planning/Dispo:  Home tomorrow hopefully with oncology and PC follow up  Diet:  DIET FULL LIQUID  DVT ppx:  heparin      Signed:  Pati Arita MD

## 2018-06-13 NOTE — PROGRESS NOTES
END OF SHIFT NOTE:  Patient status post flexible sigmoid. No distress of complain of abdominal discomfort. Potassium 40 meq po given as directed. Afebrile, family, parents present  @ bedside. Intake/Output      Voiding: yes   Catheter: yes   Drain:              Stool:   Yes with fleet enemas given this am  occurrences. Emesis:  No  occurrences. VITAL SIGNS  Patient Vitals for the past 12 hrs:   Temp Pulse Resp BP SpO2   06/12/18 1945 98 °F (36.7 °C) 96 18 161/83 96 %   06/12/18 1537 - - - 151/82 -   06/12/18 1527 98.2 °F (36.8 °C) 92 20 94/70 96 %   06/12/18 1221 97.4 °F (36.3 °C) 89 18 128/77 93 %   06/12/18 1157 - 88 18 156/82 97 %   06/12/18 1147 - 87 18 156/83 96 %   06/12/18 1137 - 85 18 155/87 99 %   06/12/18 1127 - 84 18 148/82 99 %   06/12/18 1120 - 76 16 146/80 97 %   06/12/18 1058 - 95 14 158/79 99 %   06/12/18 1043 - 94 16 156/78 97 %       Pain Assessment  Pain 1  Pain Scale 1: Numeric (0 - 10) (06/12/18 1849)  Pain Intensity 1: 0 (06/12/18 1849)  Patient Stated Pain Goal: 0 (06/12/18 1157)  Pain Reassessment 1: Patient sleeping (06/12/18 1127)    Ambulating  Yes  From bed to bathroom bedside commode    Additional Information:  See above     Shift report given to oncoming nurse Jeremy Hernandez RN  at the bedside.     Enrique Lara RN

## 2018-06-13 NOTE — PROGRESS NOTES
Pt resting in bed, mother and father at bedside. VSS on RA. No complaints of pain. Cox removed per order. Pt has voided since. >1000mL brown urine output this shift. Good appetite. No stool. Pt's mother states that they have decided to have the stent placement done. Will pass off in rounds to please keep pt NPO after midnight in case the procedure is done tomorrow. No needs voiced at this time. Shift report given to oncoming nurse at the bedside.

## 2018-06-13 NOTE — PROGRESS NOTES
Problem: Falls - Risk of  Goal: *Absence of Falls  Document Ye Fall Risk and appropriate interventions in the flowsheet.    Outcome: Progressing Towards Goal  Fall Risk Interventions:  Mobility Interventions: Bed/chair exit alarm         Medication Interventions: Assess postural VS orthostatic hypotension    Elimination Interventions: Bed/chair exit alarm

## 2018-06-13 NOTE — PROGRESS NOTES
[facility]        Inpatient Hematology / Oncology Progress Note      Admission Date: 2018 12:25 PM  Reason for Admission/Hospital Course: Elevated LFTs [R79.89]  Colon stricture (HCC) [K56.699]      24 Hour Events:  Sitting up in bedside chair  Parents at bedside  Cheerful and in good spirits      ROS:  Constitutional: negative for fever, chills, weakness, malaise, fatigue. CV: negative for chest pain, palpitations, edema. Respiratory:  negative for dyspnea, cough, wheezing. GI: negative for nausea, abdominal pain, diarrhea. 10 point review of systems is otherwise negative with the exception of the elements mentioned above in the HPI. Allergies   Allergen Reactions    Geodon [Ziprasidone Hcl] Rash       OBJECTIVE:  Patient Vitals for the past 8 hrs:   BP Temp Pulse Resp SpO2   18 1045 131/72 98.3 °F (36.8 °C) 89 18 100 %   18 0650 123/64 98.2 °F (36.8 °C) 98 18 93 %     Temp (24hrs), Av.2 °F (36.8 °C), Min:98 °F (36.7 °C), Max:98.4 °F (36.9 °C)     0701 -  1900  In: 480 [P.O.:480]  Out: 500 [Urine:500]    Physical Exam:  Constitutional: Developmentally delayed female in no acute distress,sitting up in bedside chair   HEENT: Normocephalic and atraumatic. Oropharynx is clear, mucous membranes are moist. Sclera icteric. Neck supple    Skin Warm and dry. No bruising and no rash noted. + jaundice   Respiratory Lungs are clear to auscultation bilaterally without wheezes, rales or rhonchi, normal air exchange without accessory muscle use. CVS Normal rate, regular rhythm and normal S1 and S2. No murmurs, gallops, or rubs. Abdomen Soft, + mildly distended, no significant tenderness, normoactive bowel sounds. No palpable mass    Neuro Grossly nonfocal with no obvious sensory or motor deficits. MSK Normal range of motion in general.  No edema and no tenderness. Psych Appropriate mood and affect.           Labs:    Recent Labs      18   6172 06/12/18 0347 06/11/18   0913  06/10/18   1540   WBC  8.6  9.1  8.8  8.6   RBC  3.23*  3.39*  3.62*  3.63*   HGB  8.8*  9.3*  10.1*  10.1*   HCT  27.4*  28.6*  30.4*  30.4*   MCV  84.8  84.4  84.0  83.7   MCH  27.2  27.4  27.9  27.8   MCHC  32.1  32.5  33.2  33.2   RDW  20.4*  20.1*  19.9*  19.6*   PLT  431  436  484*  464*   GRANS   --   54  56  58   LYMPH   --   32  26  28   MONOS   --   7  8  7   EOS   --   4  6  5   BASOS   --   1  2  1   IG   --   2  2  1   DF   --   AUTOMATED  AUTOMATED  AUTOMATED   ANEU   --   5.0  5.0  5.0   ABL   --   2.9  2.3  2.4   ABM   --   0.6  0.7  0.6   SANTOS   --   0.4  0.5  0.4   ABB   --   0.1  0.1  0.1   AIG   --   0.2  0.2  0.1      Recent Labs      06/13/18   0349 06/12/18 0347 06/11/18   0406  06/10/18   1540   NA  140  142   --   139   K  3.9  3.3*   --   2.8*   CL  103  104   --   101   CO2  27  26   --   26   AGAP  10  12   --   12   GLU  95  103*   --   129*   BUN  10  8   --   9   CREA  0.59*  0.59*   --   0.67   GFRAA  >60  >60   --   >60   GFRNA  >60  >60   --   >60   CA  8.6  8.7   --   8.2*   SGOT  142*  179*  281*  304*   AP  737*  823*  895*  918*   TP  7.0  6.8  6.9  7.1   ALB  2.0*  1.9*  1.9*  2.1*   GLOB  5.0*  4.9*  5.0*  5.0*   AGRAT  0.4*  0.4*  0.4*  0.4*   MG  1.9   --    --    --          Imaging:      ASSESSMENT:    Problem List  Date Reviewed: 6/12/2018          Codes Class Noted    * (Principal)Metastatic colon cancer in female [facility]) ICD-10-CM: C78.5  ICD-9-CM: 197.5  6/10/2018        Elevated CEA ICD-10-CM: R97.0  ICD-9-CM: 795.81  6/10/2018        Cholestatic hepatitis ICD-10-CM: K75.89  ICD-9-CM: 573.8  6/10/2018        Obstructive jaundice due to cancer [facility]) ICD-10-CM: K83.1, C80.1  ICD-9-CM: 576.8  6/9/2018        Cholelithiasis ICD-10-CM: K80.20  ICD-9-CM: 574.20  6/1/2018        Gastric erosion ICD-10-CM: K25.9  ICD-9-CM: 535.40  6/1/2018        Gastroparesis (Chronic) ICD-10-CM: K31.84  ICD-9-CM: 536.3  6/1/2018        Cognitive developmental delay (Chronic) ICD-10-CM: F81.9  ICD-9-CM: 315.9  5/31/2018        Elevated liver function tests ICD-10-CM: R79.89  ICD-9-CM: 790.6  5/31/2018        Iron deficiency anemia due to chronic blood loss (Chronic) ICD-10-CM: D50.0  ICD-9-CM: 280.0  5/31/2018        Elevated blood pressure reading ICD-10-CM: R03.0  ICD-9-CM: 796.2  5/31/2018        Hypomagnesemia ICD-10-CM: E83.42  ICD-9-CM: 275.2  5/31/2018        Hyperglycemia (Chronic) ICD-10-CM: R73.9  ICD-9-CM: 790.29  5/31/2018        Obesity ICD-10-CM: E66.9  ICD-9-CM: 278.00  11/15/2017        Elevated blood sugar ICD-10-CM: R73.9  ICD-9-CM: 790.29  11/15/2017        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 Kong [facility]              Esophagitis ICD-10-CM: K20.9  ICD-9-CM: 530.10  5/4/2017    Overview Signed 5/5/2017  2:26 PM by Pascual Strickland MD     0-4715 EGD showed healing esophagitis improved compared to prior EGD - plan continue omprezole              (ventriculoperitoneal) shunt status (Chronic) ICD-10-CM: X78.1  ICD-9-CM: V45.2  Unknown    Overview Signed 3/21/2017  3:57 PM by Pascual Strickland MD     not functioning based on 2016 shunt xray series             Secondary hypothyroidism ICD-10-CM: E03.8  ICD-9-CM: 244.8  3/2/2017        Vasovagal syncope ICD-10-CM: R55  ICD-9-CM: 780.2  3/2/2017        Paranoid schizophrenia ([facility] [address] (Chronic) ICD-10-CM: F20.0  ICD-9-CM: 295.30  Unknown        OCD (obsessive compulsive disorder) ICD-10-CM: F42.9  ICD-9-CM: 300.3  Unknown        Tachycardia (Chronic) ICD-10-CM: R00.0  ICD-9-CM: 785.0  11/8/2016        Intellectual disability (Chronic) ICD-10-CM: E69  ICD-9-CM: 420  Unknown        Hyperlipidemia (Chronic) ICD-10-CM: E78.5  ICD-9-CM: 272.4  9/22/2016        Chronic sinusitis ICD-10-CM: J32.9  ICD-9-CM: 473.9  9/22/2016        TIA (transient ischemic attack) ICD-10-CM: G45.9  ICD-9-CM: 435.9  10/13/2014 Hypothyroidism (Chronic) ICD-10-CM: E03.9  ICD-9-CM: 244.9  10/13/2014        Depression (Chronic) ICD-10-CM: F32.9  ICD-9-CM: 174  10/13/2014        GERD (gastroesophageal reflux disease) (Chronic) ICD-10-CM: K21.9  ICD-9-CM: 530.81  10/13/2014                PLAN:    Concern for metastatic disease/cholangiocarcinoma on imaging  - Will get CT CAP to further evaluate and complete staging. Follow up ERCP planned for tomorrow. If confirms maligancy, will need to send for next gen sequencing. If not enough tissue, may still need EUS. Discussed nature of metastatic cancer and ultimately poor prognosis. If confirmed, Tabatha Hagan and family to have luis manuel discussion regarding goals of care and benefit of treatment for her  - CA 19-9 1316, CEA 25.8  6/13 SP Sigmoidoscopy-there is a circumferential mass unable to pass scope-mass is friable, ulcerated and consistent with malignancy-multiple biopsies obtained     Hyperbilirubinemia  - From above. ERCP with stent placement tomorrow  6/13 Stent placed-bili improving     Iron deficiency anemia  - Start replacements. Hgb adequate at 11.4   6/13 Hgb 8.8-continue to monitor     Hx paranoid schizophrenia  - Continue home meds      Lab studies and imaging studies (MRCP) were personally reviewed. Thank you for allowing us to participate in the care of Ms. Matilde Mccarthy. Hospitalist planning to discharge. I have scheduled follow up appointment next week with Dr. Erasmo Nguyen to discuss results and plan of care. Family is leaning toward comfort measures. [employer], [facility]  [address] [address]  Office : [phone]  Fax : [phone]       Attending Addendum:  I personally evaluated the patient with VINICIO Salinas, and agree with the assessment, findings and plan as documented. Appears well, sitting in chair crocheting, heart regular, lungs clear, abdomen benign. I discussed the imaging and sigmoidoscopy findings with the parents. They are aware that the findings are most consistent with metastatic disease. The parents would like to have a discussion regarding the final pathology when available, whether or not Ms Gisselle Small will pursue treatment. They will have a follow up apt with Dr Brijesh Paz after discharge.             Kelley Altman MD  [facility]  [address] [place] [address]  Office : [phone]  Fax : [phone]

## 2018-06-13 NOTE — PROGRESS NOTES
END OF SHIFT NOTE:    Intake/Output  06/12 1901 - 06/13 0700  In: -   Out: 450 [Urine:450]   Voiding: YES  Catheter: YES  Drain:              Stool:  0 occurrences. Emesis:  0 occurrences. VITAL SIGNS  Patient Vitals for the past 12 hrs:   Temp Pulse Resp BP SpO2   06/13/18 0330 98.3 °F (36.8 °C) 95 18 147/77 94 %   06/13/18 0032 98.4 °F (36.9 °C) 98 18 149/78 93 %   06/12/18 1945 98 °F (36.7 °C) 96 18 161/83 96 %       Pain Assessment  Pain 1  Pain Scale 1: Numeric (0 - 10) (06/13/18 0432)  Pain Intensity 1: 0 (06/13/18 0432)  Patient Stated Pain Goal: 0 (06/12/18 1157)  Pain Reassessment 1: Patient sleeping (06/12/18 1127)    Ambulating  Yes with assist.    Additional Information:     Shift report given to Neptali Fontana RN oncoming nurse at the bedside.     Shauna Escobar

## 2018-06-14 NOTE — PROGRESS NOTES
END OF SHIFT NOTE:    Intake/Output  06/14 0701 - 06/14 1900  In: 600 [I.V.:600]  Out: 950 [Urine:950]   Voiding: YES  Catheter: NO  Drain:              Stool:  3 occurrences. Stool Assessment  Stool Color: Brown (06/14/18 1800)  Stool Appearance: Soft (06/14/18 1800)  Stool Amount: Large (06/14/18 1800)  Stool Source/Status: Rectum (06/14/18 1800)    Emesis:  0 occurrences. VITAL SIGNS  Patient Vitals for the past 12 hrs:   Temp Pulse Resp BP SpO2   06/14/18 1800 98 °F (36.7 °C) (!) 108 18 134/63 95 %   06/14/18 1652 98.7 °F (37.1 °C) (!) 107 18 123/62 94 %   06/14/18 1648 - (!) 106 18 138/65 93 %   06/14/18 1643 - (!) 106 18 124/65 94 %   06/14/18 1637 - (!) 106 18 124/65 95 %   06/14/18 1632 - (!) 103 16 123/65 96 %   06/14/18 1629 98.8 °F (37.1 °C) (!) 102 15 132/80 96 %   06/14/18 1627 98.8 °F (37.1 °C) (!) 102 15 119/60 96 %   06/14/18 1343 - (!) 102 18 177/86 95 %   06/14/18 1040 98.2 °F (36.8 °C) 96 20 158/77 98 %   06/14/18 0700 98.7 °F (37.1 °C) (!) 103 20 146/81 94 %       Pain Assessment  Pain 1  Pain Scale 1: Numeric (0 - 10) (06/14/18 1800)  Pain Intensity 1: 0 (06/14/18 1800)  Patient Stated Pain Goal: 0 (06/14/18 1347)  Pain Reassessment 1: Yes (06/14/18 1652)  Pain Intervention(s) 1: Emotional support (06/14/18 1652)    Ambulating  Yes    Additional Information: Patient went down for ERCP with palliative colon stent placement. Returned from PACU, vitals stable. Shift report given to Kimmie Pate RN oncoming nurse at the bedside.     Kendra Vera

## 2018-06-14 NOTE — PROGRESS NOTES
[facility] Progress Note      Admit Date: 6/9/2018    CC: Metastatic colon cancer    Subjective:     No BM since flex sig. Minimal flatus. Denies N/V or abdominal pain this morning. C/o heartburn. Medications:  Current Facility-Administered Medications   Medication Dose Route Frequency    famotidine (PEPCID) tablet 20 mg  20 mg Oral PRN    polyethylene glycol (MIRALAX) packet 17 g  17 g Oral Q12H    ferric gluconate (FERRLECIT) 125 mg in 0.9% sodium chloride 100 mL ivpb  125 mg IntraVENous Q7D    sodium chloride (NS) flush 20 mL  20 mL InterCATHeter Q8H    heparin (porcine) pf 600 Units  600 Units InterCATHeter Q8H    sodium chloride (NS) flush 20 mL  20 mL InterCATHeter PRN    heparin (porcine) pf 600 Units  600 Units InterCATHeter PRN    acetaminophen (TYLENOL) tablet 650 mg  650 mg Oral Q6H PRN    ALPRAZolam (XANAX) tablet 1 mg  1 mg Oral TID PRN    calcium-vitamin D (OS-ESTRADA) 500 mg-200 unit tablet  1 Tab Oral DAILY    desvenlafaxine succinate (PRISTIQ) ER tablet 50 mg (Patient Supplied)  50 mg Oral DAILY    pantoprazole (PROTONIX) tablet 40 mg  40 mg Oral ACB&D    fludrocortisone (FLORINEF) tablet 100 mcg  0.1 mg Oral DAILY    levothyroxine (SYNTHROID) tablet 200 mcg  200 mcg Oral ACB    QUEtiapine (SEROquel) tablet 600 mg  600 mg Oral QHS    risperiDONE (RisperDAL) tablet 1 mg  1 mg Oral QID    sodium chloride (NS) flush 5-10 mL  5-10 mL IntraVENous Q8H    sodium chloride (NS) flush 5-10 mL  5-10 mL IntraVENous PRN    insulin lispro (HUMALOG) injection   SubCUTAneous AC&HS    heparin (porcine) injection 5,000 Units  5,000 Units SubCUTAneous Q12H       ROS: Otherwise negative in 10 systems except as noted above in Subjective.     Objective:   Vitals:  Visit Vitals    /81 (BP 1 Location: Left arm)    Pulse (!) 103    Temp 98.7 °F (37.1 °C)    Resp 20    Wt 72.9 kg (160 lb 12.8 oz)    SpO2 94%    Breastfeeding No    BMI 30.38 kg/m2       Intake/Output: 06/12 1901 - 06/14 0700  In: 720 [P.O.:720]  Out: 3350 [Urine:3350]    Exam:  General appearance: alert, no distress  Lungs: clear to auscultation bilaterally  Heart: regular rate and rhythm  Abdomen: soft, NT/ND, +BS  Extremities: no cyanosis + edema    Data Review (Labs):    Recent Results (from the past 24 hour(s))   GLUCOSE, POC    Collection Time: 06/13/18 10:47 AM   Result Value Ref Range    Glucose (POC) 112 (H) 65 - 100 mg/dL   GLUCOSE, POC    Collection Time: 06/13/18  3:58 PM   Result Value Ref Range    Glucose (POC) 123 (H) 65 - 100 mg/dL   GLUCOSE, POC    Collection Time: 06/13/18  8:50 PM   Result Value Ref Range    Glucose (POC) 122 (H) 65 - 100 mg/dL   GLUCOSE, POC    Collection Time: 06/14/18  7:06 AM   Result Value Ref Range    Glucose (POC) 108 (H) 65 - 100 mg/dL     Flex Sig : friable mass at 19cm, nearly obstructing. Bx: + adenocarcinoma. Assessment:     Principal Problem:    Metastatic colon cancer in female [facility]) (6/10/2018)    Active Problems:    Hypothyroidism (10/13/2014)      Depression (10/13/2014)      GERD (gastroesophageal reflux disease) (10/13/2014)      Hyperlipidemia (9/22/2016)      Intellectual disability ()      Paranoid schizophrenia ([facility] [address] ()       (ventriculoperitoneal) shunt status ()      Overview: not functioning based on 2016 shunt xray series      Cognitive developmental delay (5/31/2018)      Iron deficiency anemia due to chronic blood loss (5/31/2018)      Gastroparesis (6/1/2018)      Obstructive jaundice due to cancer ([address] (6/9/2018)      Elevated CEA (6/10/2018)      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 Metastatic sigmoid colon cancer- confirmed on biopsy. Discussed with pt and mother. 2. Per mom, no chemo, comfort measures only. 3. Discussed the implications of impending bowel obstruction. Diverting ostomy vs palliative colon stent placement. They desire colonic stent placement. Will arrange today. Hold am heparin dose.    4. Can go home after colonic stent placement. 5. Cont Miralax bid. Consider low residue diet if palliative approach is taken. Shana Garica PA-C  [facility]        I have seen and examined this patient, and agree with above assessment and plan. Biliary brushings confirm metastatic adenocarcinoma. Will not schedule repeat ERCP for stent change based on comfort measures only. Can be done as needed. Very limited prognosis without treatment. Palliative colon stent today.     Juan Barakat MD

## 2018-06-14 NOTE — PROGRESS NOTES
TRANSFER - OUT REPORT:    Verbal report given to GI labs nurse on Trinity Torres  being transferred to Endoscopy for ordered procedure colonoscopy with stent placement    Report consisted of patients Situation, Background, Assessment and   Recommendations(SBAR). Information from the following report(s) SBAR, Kardex, Intake/Output and MAR was reviewed with the receiving nurse. Lines:   PICC Double Lumen 37/19/60 Right;Basilic (Active)   Central Line Being Utilized Yes 6/14/2018  1:21 PM   Criteria for Appropriate Use Long term IV/antibiotic administration 6/14/2018  1:21 PM   Site Assessment Clean, dry, & intact 6/14/2018  1:21 PM   Phlebitis Assessment 0 6/14/2018  1:21 PM   Infiltration Assessment 0 6/14/2018  1:21 PM   Arm Circumference (cm) 33 cm 6/10/2018  2:50 PM   Date of Last Dressing Change 06/14/18 6/14/2018  1:21 PM   Dressing Status Clean, dry, & intact 6/14/2018  1:21 PM   Action Taken Dressing changed 6/14/2018  8:02 AM   External Catheter Length (cm) 3 centimeters 6/10/2018  2:50 PM   Dressing Type Disk with Chlorhexadine gluconate (CHG); Transparent 6/14/2018  1:21 PM   Hub Color/Line Status Infusing 6/14/2018  1:21 PM   Positive Blood Return (Site #1) Yes 6/14/2018  1:21 PM   Hub Color/Line Status Capped 6/14/2018  1:21 PM   Positive Blood Return (Site #2) Yes 6/14/2018  1:21 PM   Alcohol Cap Used No 6/14/2018  1:21 PM        Opportunity for questions and clarification was provided. Patient transported with:   Tech on a stretcher.

## 2018-06-14 NOTE — ANESTHESIA POSTPROCEDURE EVALUATION
Post-Anesthesia Evaluation and Assessment    Patient: Chapito Driscoll MRN: [id number]  SSN: [id number]    YOB: 1980  Age: 44 y.o. Sex: female       Cardiovascular Function/Vital Signs  Visit Vitals    /86    Pulse (!) 102    Temp 36.8 °C (98.2 °F)    Resp 18    Wt 72.9 kg (160 lb 12.8 oz)    SpO2 95%    Breastfeeding No    BMI 30.38 kg/m2       Patient is status post total IV anesthesia anesthesia for Procedure(s):  COLONOSCOPY with Stent placement   ENDOSCOPY WITH PROSTHESIS OR STENT PLACEMENT. Nausea/Vomiting: None    Postoperative hydration reviewed and adequate. Pain:  Pain Scale 1: Numeric (0 - 10) (06/14/18 1347)  Pain Intensity 1: 0 (06/14/18 1347)   Managed    Neurological Status:   Neuro (WDL): Exceptions to WDL (06/11/18 1655)  Neuro  Neurologic State: Alert (06/14/18 5712)  Orientation Level: Oriented X4 (06/14/18 0802)  Cognition: Follows commands (06/14/18 0802)   At baseline    Mental Status and Level of Consciousness: Arousable    Pulmonary Status:   O2 Device: Room air (06/14/18 1343)   Adequate oxygenation and airway patent    Complications related to anesthesia: None    Post-anesthesia assessment completed.  No concerns    Signed By: Renay Bosworth, MD     June 14, 2018

## 2018-06-14 NOTE — PROGRESS NOTES
Problem: Falls - Risk of  Goal: *Absence of Falls  Document Ye Fall Risk and appropriate interventions in the flowsheet.    Outcome: Progressing Towards Goal  Fall Risk Interventions:  Mobility Interventions: Bed/chair exit alarm         Medication Interventions: Bed/chair exit alarm    Elimination Interventions: Call light in reach

## 2018-06-14 NOTE — ANESTHESIA PREPROCEDURE EVALUATION
Anesthetic History   No history of anesthetic complications            Review of Systems / Medical History  Patient summary reviewed and pertinent labs reviewed    Pulmonary  Within defined limits                 Neuro/Psych         TIA (10/14 on b[facility] since then) and psychiatric history     Cardiovascular  Within defined limits                Exercise tolerance: >4 METS     GI/Hepatic/Renal     GERD (hiatel hernia, significant reflux.): well controlled           Endo/Other      Hypothyroidism: well controlled  Obesity and cancer (brain)     Other Findings   Comments: Paranoid delusional disorder  Depression  Brain tumor--left eye closed             Physical Exam    Airway  Mallampati: II  TM Distance: 4 - 6 cm  Neck ROM: normal range of motion   Mouth opening: Normal     Cardiovascular    Rhythm: regular  Rate: normal      Pertinent negatives: No murmur, JVD and peripheral edema   Dental  No notable dental hx       Pulmonary  Breath sounds clear to auscultation               Abdominal  GI exam deferred       Other Findings            Anesthetic Plan    ASA: 3  Anesthesia type: total IV anesthesia          Induction: Intravenous  Anesthetic plan and risks discussed with: Patient and Mother      GETA given the nature of the procedure an potential for prolonged inssuflation of colon with potential stricture.

## 2018-06-14 NOTE — PERIOP NOTES
TRANSFER - OUT REPORT:    Verbal report given to [address] on [address]  being transferred to  for routine post - op       Report consisted of patients Situation, Background, Assessment and   Recommendations(SBAR). Information from the following report(s) SBAR, OR Summary, Procedure Summary, Intake/Output and MAR was reviewed with the receiving nurse. Lines:   PICC Double Lumen 59/75/91 Right;Basilic (Active)   Central Line Being Utilized Yes 6/14/2018  4:27 PM   Criteria for Appropriate Use Long term IV/antibiotic administration 6/14/2018  4:27 PM   Site Assessment Clean, dry, & intact 6/14/2018  4:27 PM   Phlebitis Assessment 0 6/14/2018  4:27 PM   Infiltration Assessment 0 6/14/2018  4:27 PM   Arm Circumference (cm) 33 cm 6/10/2018  2:50 PM   Date of Last Dressing Change 06/14/18 6/14/2018  1:21 PM   Dressing Status Clean, dry, & intact 6/14/2018  1:21 PM   Action Taken Dressing changed 6/14/2018  8:02 AM   External Catheter Length (cm) 3 centimeters 6/10/2018  2:50 PM   Dressing Type Disk with Chlorhexadine gluconate (CHG); Transparent 6/14/2018  1:21 PM   Hub Color/Line Status Infusing 6/14/2018  1:21 PM   Positive Blood Return (Site #1) Yes 6/14/2018  1:21 PM   Hub Color/Line Status Capped 6/14/2018  1:21 PM   Positive Blood Return (Site #2) Yes 6/14/2018  1:21 PM   Alcohol Cap Used No 6/14/2018  1:21 PM        Opportunity for questions and clarification was provided. Patient transported with:   Tech    VTE prophylaxis orders have been written for Blaire Mota. Patient and family given floor number and nurses name. Family updated re: pt status after security code verified.

## 2018-06-14 NOTE — PROCEDURES
Flex sig Procedure Note    PreOp Diagnosis:   Metastatic colon cancer  Colon stricture with obstruction    PostOp Diagnosis:  Sigmoid colon cancer  Successful placement of colon stent    Medications:  Monitored Anesthesia    Procedure:  Colonoscopy  Instrument:   AL  After informed consent was obtained, the patient was sedated and the colonoscope was inserted  in the anus and advanced into the sigmoid colon without difficulty. Prep: poor    Findings:   A large amount of semi-formed stool in the rectum. Obstructing colon cancer identified at approximately 20 cm, rectosigmoid junction. A 0.35 long guidewire was advanced through a 9/12 mm extraction balloon, through the colon stricture and coiled in the proximal portion of the sigmoid. The balloon was passed over the guidewire across the stricture. The balloon was fully inflated and then withdrawn to tessa the proximal extent of the stricture. The stricture is only approximately 4 cm in length. The balloon was deflated and removed leaving the wire in place. The proximal margin of the stricture was marked externally. A 90 mm x 22 mm wallflex Stent was placed over the guidewire, across the stricture, using fluoroscopic guidance. Stent position appears optimal with a tight waist in the center of the stent. Optimal position was confirmed fluoroscopically and endoscopically. The instruments were removed and the patient taken to recovery in stable condition.     Recommendations:  Low residue diet  Continue MiraLax  Anticipate home tomorrow with palliative care    Gerry Barth MD

## 2018-06-14 NOTE — PROGRESS NOTES
END OF SHIFT NOTE:    Intake/Output  06/13 1901 - 06/14 0700  In: -   Out: 1300 [Urine:1300]   Voiding: YES  Catheter: NO  Drain:              Stool:  0 occurrences. Emesis:  0 occurrences. VITAL SIGNS  Patient Vitals for the past 12 hrs:   Temp Pulse Resp BP SpO2   06/14/18 0101 97.9 °F (36.6 °C) (!) 103 18 157/74 94 %   06/13/18 1941 98.5 °F (36.9 °C) 94 18 167/87 95 %       Pain Assessment  Pain 1  Pain Scale 1: Visual (06/14/18 0315)  Pain Intensity 1: 0 (06/14/18 0315)  Patient Stated Pain Goal: 0 (06/14/18 0315)  Pain Reassessment 1: Patient sleeping (06/14/18 0315)    Ambulating  Yes    Additional Information: Pt is alert. Father is at the bedside. Pt has been NPO since midnight. No needs voiced. Pt has slept peacefully through the night. Shift report given to oncoming nurse at the bedside.     Bc Tolentino RN

## 2018-06-14 NOTE — PROGRESS NOTES
Hospitalist Progress Note     Admit Date:  2018 12:25 PM   Name:  Meet Zepeda   Age:  44 y.o.  :  1980   MRN:  [id number]   PCP:  Darius Dorado MD  Treatment Team: Attending Provider: Marky Pozo MD; Consulting Provider: Ivette Kawasaki, MD; Utilization Review: Nati Kapadia RN; Student Nurse: Adán Dunn    Subjective:   45 yo female who presented with jaundice on a background of GERD, anxiety, DM II, Prior Brain tumor with  shunt, parnoid schizophrenia, CVA, and  Gastroparesis. She is a limited historian due to mental disability. Her mother reported that the patient had worsening jaundice. recently admitted in 2018 with transaminitis with EGD showing esophagitis and gastric erosion. US abdomen showed fatty liver and gastric emptying study was positive for delayed emptying. This admission, CT c/a/p showed findings suspicious for widely metastatic colon cancer. she was seen by GI and MRCP was ordered which was concerning for malignancy so ERCP was done 18 which showed stricture of common hepatic duct; sphincterotomy done and brushings sent for cytology. Gallbladder normal.   Colonoscopy done which showed friable, ulcerated mass in colon consisted with malignancy. Oncology was consulted, recommended palliative approach if biopsy confirmed cancer.  - pt denies pain, n/v.  Jaundice improving per parents. No CP, SOB. Reports having BMs, some with blood   - pt eating, doesn't like liquid diet. No abd pain, n/v. No BM yet.  - c/o not being able to eat. NPO for colonic stent. No abd pain, n/v, melena, BRBPR.     Objective:     Patient Vitals for the past 24 hrs:   Temp Pulse Resp BP SpO2   18 1343 - (!) 102 18 177/86 95 %   18 1040 98.2 °F (36.8 °C) 96 20 158/77 98 %   18 0700 98.7 °F (37.1 °C) (!) 103 20 146/81 94 %   18 0423 99.1 °F (37.3 °C) 99 20 130/67 95 %   18 0101 97.9 °F (36.6 °C) (!) 103 18 157/74 94 %   18 98.5 °F (36.9 °C) 94 18 167/87 95 %     Oxygen Therapy  O2 Sat (%): 95 % (06/14/18 1343)  Pulse via Oximetry: 102 beats per minute (06/14/18 1343)  O2 Device: Room air (06/14/18 1343)  O2 Flow Rate (L/min): 2 l/min (06/12/18 1120)    Intake/Output Summary (Last 24 hours) at 06/14/18 1539  Last data filed at 06/14/18 1321   Gross per 24 hour   Intake              240 ml   Output             2750 ml   Net            -2510 ml         General:    Well nourished. Alert. CV:   RRR. No murmur, rub, or gallop. Extremities: Warm and dry. No cyanosis or edema. Skin:     No rashes. jaundice. Neuro:  No gross focal deficits    Data Review:  I have reviewed all labs, meds, telemetry events, and studies from the last 24 hours.     Recent Results (from the past 24 hour(s))   GLUCOSE, POC    Collection Time: 06/13/18  3:58 PM   Result Value Ref Range    Glucose (POC) 123 (H) 65 - 100 mg/dL   GLUCOSE, POC    Collection Time: 06/13/18  8:50 PM   Result Value Ref Range    Glucose (POC) 122 (H) 65 - 100 mg/dL   GLUCOSE, POC    Collection Time: 06/14/18  7:06 AM   Result Value Ref Range    Glucose (POC) 108 (H) 65 - 100 mg/dL   GLUCOSE, POC    Collection Time: 06/14/18 10:43 AM   Result Value Ref Range    Glucose (POC) 105 (H) 65 - 100 mg/dL        All Micro Results     Procedure Component Value Units Date/Time    CULTURE, BLOOD [[id number]] Collected:  06/10/18 2310    Order Status:  Completed Specimen:  Blood from Blood Updated:  06/14/18 1037     Special Requests: PURPLE PICC        Culture result: NO GROWTH 3 DAYS       CULTURE, URINE [[id number]] Collected:  06/11/18 0406    Order Status:  Completed Specimen:  Urine from Cox Specimen Updated:  06/13/18 0708     Special Requests: NO SPECIAL REQUESTS        Culture result: NO GROWTH 2 DAYS             Current Meds:  Current Facility-Administered Medications   Medication Dose Route Frequency    lactated Ringers infusion  100 mL/hr IntraVENous CONTINUOUS    lactated Ringers infusion  100 mL/hr IntraVENous CONTINUOUS    sodium chloride (NS) flush 5-10 mL  5-10 mL IntraVENous PRN    lactated Ringers infusion 1,000 mL  1,000 mL IntraVENous CONTINUOUS    famotidine (PEPCID) tablet 20 mg  20 mg Oral PRN    polyethylene glycol (MIRALAX) packet 17 g  17 g Oral Q12H    ferric gluconate (FERRLECIT) 125 mg in 0.9% sodium chloride 100 mL ivpb  125 mg IntraVENous Q7D    sodium chloride (NS) flush 20 mL  20 mL InterCATHeter Q8H    heparin (porcine) pf 600 Units  600 Units InterCATHeter Q8H    sodium chloride (NS) flush 20 mL  20 mL InterCATHeter PRN    heparin (porcine) pf 600 Units  600 Units InterCATHeter PRN    acetaminophen (TYLENOL) tablet 650 mg  650 mg Oral Q6H PRN    ALPRAZolam (XANAX) tablet 1 mg  1 mg Oral TID PRN    calcium-vitamin D (OS-ESTRADA) 500 mg-200 unit tablet  1 Tab Oral DAILY    desvenlafaxine succinate (PRISTIQ) ER tablet 50 mg (Patient Supplied)  50 mg Oral DAILY    pantoprazole (PROTONIX) tablet 40 mg  40 mg Oral ACB&D    fludrocortisone (FLORINEF) tablet 100 mcg  0.1 mg Oral DAILY    levothyroxine (SYNTHROID) tablet 200 mcg  200 mcg Oral ACB    QUEtiapine (SEROquel) tablet 600 mg  600 mg Oral QHS    risperiDONE (RisperDAL) tablet 1 mg  1 mg Oral QID    sodium chloride (NS) flush 5-10 mL  5-10 mL IntraVENous Q8H    sodium chloride (NS) flush 5-10 mL  5-10 mL IntraVENous PRN    insulin lispro (HUMALOG) injection   SubCUTAneous AC&HS    heparin (porcine) injection 5,000 Units  5,000 Units SubCUTAneous Q12H     Facility-Administered Medications Ordered in Other Encounters   Medication Dose Route Frequency    fentaNYL citrate (PF) injection   IntraVENous PRN    lidocaine (PF) (XYLOCAINE) 20 mg/mL (2 %) injection   IntraVENous PRN    propofol (DIPRIVAN) 10 mg/mL injection   IntraVENous PRN    PHENYLephrine 100 mcg/mL 10 mL syringe (ONE-STEP)  1,000 mcg IntraVENous CONTINUOUS    rocuronium (ZEMURON) injection    PRN       Diet:  DIET NPO EXCEPT MEDS    Other Studies (last 24 hours):  No results found. Assessment and Plan:     Hospital Problems as of 6/14/2018  Date Reviewed: 6/12/2018          Codes Class Noted - Resolved POA    * (Principal)Metastatic colon cancer in female [facility]) ICD-10-CM: C78.5  ICD-9-CM: 197.5  6/10/2018 - Present Yes        Elevated CEA ICD-10-CM: R97.0  ICD-9-CM: 795.81  6/10/2018 - Present Yes        Cholestatic hepatitis ICD-10-CM: K75.89  ICD-9-CM: 573.8  6/10/2018 - Present Yes        Obstructive jaundice due to cancer [facility]) ICD-10-CM: K83.1, C80.1  ICD-9-CM: 576.8  6/9/2018 - Present Yes        Gastroparesis (Chronic) ICD-10-CM: K31.84  ICD-9-CM: 536.3  6/1/2018 - Present Yes        Cognitive developmental delay (Chronic) ICD-10-CM: F81.9  ICD-9-CM: 315.9  5/31/2018 - Present Yes        Iron deficiency anemia due to chronic blood loss (Chronic) ICD-10-CM: D50.0  ICD-9-CM: 280.0  5/31/2018 - Present Yes         (ventriculoperitoneal) shunt status (Chronic) ICD-10-CM: Z98.2  ICD-9-CM: V45.2  Unknown - Present Yes    Overview Signed 3/21/2017  3:57 PM by Bishop López MD     not functioning based on 2016 shunt xray series             Paranoid schizophrenia ([facility] [address] (Chronic) ICD-10-CM: F20.0  ICD-9-CM: 295.30  Unknown - Present Yes        Intellectual disability (Chronic) ICD-10-CM: F79  ICD-9-CM: 397  Unknown - Present Yes        Hyperlipidemia (Chronic) ICD-10-CM: E78.5  ICD-9-CM: 272.4  9/22/2016 - Present Yes        Hypothyroidism (Chronic) ICD-10-CM: E03.9  ICD-9-CM: 244.9  10/13/2014 - Present Yes        Depression (Chronic) ICD-10-CM: F32.9  ICD-9-CM: 407  10/13/2014 - Present Yes        GERD (gastroesophageal reflux disease) (Chronic) ICD-10-CM: K21.9  ICD-9-CM: 530.81  10/13/2014 - Present Yes        RESOLVED: Fever ICD-10-CM: R50.9  ICD-9-CM: 780.60  6/11/2018 - 6/12/2018 No              PLAN:    · Metastatic colon cancer with constipation -  LFTs improved. Hb dropped again but denying signs/symptoms of bleeding.   GI to do colonic stent today. Home after per their note unless any objections, with outpatient oncology and palliative care appointment. Family wants PICC left in. · Cox removed last night and pt urinating. · Obstructive Jaundice -   from cancer. LFTs improving after biliary sphincterotomy. No NSAIDs fo 2 weeks. · HONEY - probably from above. IV iron weekly ordered by heme  · Fever - none for days. Holding abx. UA not supportive of UTI. No PNA on CT. Gallbladder normal on studies. doubt infection at this time.    · Hypothyroidism - Continue Synthroid  · Cognitive developmental delay - Continue Risperdal, Seroquel    ADDENDUM:  Stent still not done yet and pt still in procedure so will plan to DC tomorrow not today    Diet:  DIET NPO EXCEPT MEDS  DVT ppx:  heparin      Signed:  Mckenzie Meléndez MD

## 2018-06-14 NOTE — PROGRESS NOTES
TRANSFER - IN REPORT:    Verbal report received from [address] on [address] [facility]  being received from PACU for routine progression of care after colonoscopy with stent placement      Report consisted of patients Situation, Background, Assessment and   Recommendations(SBAR). Information from the following report(s) SBAR, Procedure Summary, Intake/Output, MAR and Cardiac Rhythm -106 was reviewed with the receiving nurse. Opportunity for questions and clarification was provided. Assessment completed upon patients arrival to unit and care assumed.

## 2018-06-14 NOTE — PROGRESS NOTES
's follow-up visit requested by friend of family. Patient's mother Jaimee Mac is known to me as a former employee of the hospital. Jaimee Mac, her spouse Phil Duverney and Blaire were present during the visit as I conveyed care and concern to patient and family and offered spiritual interventions, including prayer. Discharge is anticipated today or tomorrow.       Herminia Nunez 67  Board Certified

## 2018-06-14 NOTE — DISCHARGE SUMMARY
Hospitalist Discharge Summary     Admit Date:  2018 12:25 PM   Name:  Oralia Brown   Age:  44 y.o.  :  1980   MRN:  [id number]   PCP:  Deshaun Donovan MD  Treatment Team: Attending Provider: Yaquelin Varner MD; Consulting Provider: Joan Durant MD; Utilization Review: Carlos Eduardo Jain RN; Student Nurse: Gabe Mooney    Problem List for this Hospitalization:  Hospital Problems as of 2018  Date Reviewed: 2018          Codes Class Noted - Resolved POA    * (Principal)Metastatic colon cancer in female [facility]) ICD-10-CM: C78.5  ICD-9-CM: 197.5  6/10/2018 - Present Yes        Elevated CEA ICD-10-CM: R97.0  ICD-9-CM: 795.81  6/10/2018 - Present Yes        Cholestatic hepatitis ICD-10-CM: K75.89  ICD-9-CM: 573.8  6/10/2018 - Present Yes        Obstructive jaundice due to cancer [facility]) ICD-10-CM: K83.1, C80.1  ICD-9-CM: 576.8  2018 - Present Yes        Gastroparesis (Chronic) ICD-10-CM: K31.84  ICD-9-CM: 536.3  2018 - Present Yes        Cognitive developmental delay (Chronic) ICD-10-CM: F81.9  ICD-9-CM: 315.9  2018 - Present Yes        Iron deficiency anemia due to chronic blood loss (Chronic) ICD-10-CM: D50.0  ICD-9-CM: 280.0  2018 - Present Yes         (ventriculoperitoneal) shunt status (Chronic) ICD-10-CM: Z98.2  ICD-9-CM: V45.2  Unknown - Present Yes    Overview Signed 3/21/2017  3:57 PM by Deshaun Donovan MD     not functioning based on 2016 shunt xray series             Paranoid schizophrenia ([facility] [address] (Chronic) ICD-10-CM: F20.0  ICD-9-CM: 295.30  Unknown - Present Yes        Intellectual disability (Chronic) ICD-10-CM: F79  ICD-9-CM: 971  Unknown - Present Yes        Hyperlipidemia (Chronic) ICD-10-CM: E78.5  ICD-9-CM: 272.4  2016 - Present Yes        Hypothyroidism (Chronic) ICD-10-CM: E03.9  ICD-9-CM: 244.9  10/13/2014 - Present Yes        Depression (Chronic) ICD-10-CM: F32.9  ICD-9-CM: 702  10/13/2014 - Present Yes        GERD (gastroesophageal reflux disease) (Chronic) ICD-10-CM: K21.9  ICD-9-CM: 530.81  10/13/2014 - Present Yes        RESOLVED: Fever ICD-10-CM: R50.9  ICD-9-CM: 780.60  6/11/2018 - 6/12/2018 No                Admission HPI from 6/9/2018:    \"Ms. Clarisa Kaiser is a 43 yo female who presented with jaundice on a background of GERD, anxiety, DM II, Prior Brain tumor with  shunt, parnoid schizophrenia, CVA, and  Gastroparesis. She is a limited historian due to mental disability though oriented to person, place and time but her mother was present at bedside and provided additional history. Her mother reported that the patient had worsening jaundice since Tuesday. Her mentation is at baseline. The patient denied any BRBPR or melena. She denies any fever or chills. Her mother reports that she takes 500mg of tylenol 4 times/week, denies any NSAID use. She also notes dark urine.      Of note, she was recently admitted in 05/2018 with transaminitis with EGD showing esophagitis and gastric erosion. US abdomen showed fatty liver and gastric emptying study was positive for delayed emptying. She was also transfused pRBC at that time with plans for HIDA scan outpatient.     [facility] Course: This admission, CT c/a/p showed findings suspicious for widely metastatic colon cancer. she was seen by GI and MRCP was ordered which was concerning for malignancy so ERCP was done 6/11/18 which showed stricture of common hepatic duct; sphincterotomy done and brushings sent for cytology; came back positive for metastatic adenocarcinoma. Colonoscopy done which showed friable, ulcerated mass in colon consisted with malignancy; biopsy confirmed adenocarcinoma. Oncology was consulted, recommended palliative approach. Family agreeable; but want to meet with oncology next week. They will see palliative care at the same time. GI placed palliative colonic stent prior to discharge to prevent obstruction. Follow up instructions and discharge meds at bottom of this note.   Plan was discussed with pt, family. All questions answered. Patient was stable at time of discharge. Diagnostic Imaging/Tests:   [facility] Wo Cont    Result Date: 6/9/2018  Examination: MRI abdomen with and without contrast Indication: upper abd pain, jaundice, 37 years Female jaundice and abdominal pain for a month Comparison: Abdominal ultrasound May 31, 2018. Technique:  Multiplanar, multisequence MR imaging was performed of the abdomen prior to and following gadolinium contrast. 10 cc Multihance contrast material was administrated intravenously for the examination. 3-dimensional MRCP was performed using maximum intensity projection reconstruction. This study was acquired following the IV administration of contrast material, given the patient's indications for the examination. If IV contrast material had not been administered, the likely of detecting abnormalities relevant to the patient's condition would have been substantially decreased. Findings: Trace bilateral pleural effusions. The liver is unremarkable in contour. There are multiple heterogeneous centrally hypoenhancing masses throughout the liver, with suggestion of peripheral delayed rim enhancement, measuring up to 4.7 x 4.4 cm in the central left hepatic lobe and 3.4 x 5.0 cm in the posterior right hepatic lobe these are suspicious for hepatic metastatic disease versus multifocal cholangiocarcinoma. 1 these masses is centered in the caudate lobe and may be causing hilar biliary obstruction, with resulting mild diffuse intrahepatic biliary ductal dilatation. The mid to lower portion of the common bile duct are normal in caliber measuring up to 3 mm in diameter. There appears to be a small filling defect in the inferior common bile duct measuring up to 3 mm in diameter, this may represent choledocholithiasis. Normal-appearing pancreatic duct. Visualized hepatic venous and portal venous branches appear patent.   Post contrast image quality is somewhat degraded by motion artifact. The pancreas, spleen, adrenal glands, and kidneys are unremarkable. No evidence of intraperitoneal free fluid. No evidence of significant lymphadenopathy. The aorta, IVC, portal veins, and hepatic veins appear patent. The visualized small and large bowel appear unremarkable. No evidence of aggressive osseous lesion. IMPRESSION: 1. Findings highly suspicious for diffuse hepatic metastatic disease versus multifocal cholangiocarcinoma. Image quality somewhat degraded by motion artifact. Percutaneous tissue sampling may be helpful in further evaluation. Evidence of central biliary obstruction by tumor. 2.  Question small choledocholithiasis in the inferior common bile duct. 3.  Other incidental findings as above. Echocardiogram results:  No results found for this visit on 06/09/18.       All Micro Results     Procedure Component Value Units Date/Time    CULTURE, BLOOD [[id number]] Collected:  06/10/18 2310    Order Status:  Completed Specimen:  Blood from Blood Updated:  06/14/18 1037     Special Requests: PURPLE PICC        Culture result: NO GROWTH 3 DAYS       CULTURE, URINE [[id number]] Collected:  06/11/18 0406    Order Status:  Completed Specimen:  Urine from Cox Specimen Updated:  06/13/18 0708     Special Requests: NO SPECIAL REQUESTS        Culture result: NO GROWTH 2 DAYS             Labs: Results:       BMP, Mg, Phos Recent Labs      06/13/18   0349  06/12/18   0347   NA  140  142   K  3.9  3.3*   CL  103  104   CO2  27  26   AGAP  10  12   BUN  10  8   CREA  0.59*  0.59*   CA  8.6  8.7   GLU  95  103*   MG  1.9   --       CBC Recent Labs      06/13/18   0349  06/12/18   0347   WBC  8.6  9.1   RBC  3.23*  3.39*   HGB  8.8*  9.3*   HCT  27.4*  28.6*   PLT  431  436   GRANS   --   54   LYMPH   --   32   EOS   --   4   MONOS   --   7   BASOS   --   1   IG   --   2   ANEU   --   5.0   ABL   --   2.9   SANTOS   --   0.4   ABM   --   0.6   ABB   --   0.1   AIG   --   0.2      LFT Recent Labs      06/13/18   0349  06/12/18   0347   SGOT  142*  179*   ALT  213*  250*   AP  737*  823*   TP  7.0  6.8   ALB  2.0*  1.9*   GLOB  5.0*  4.9*   AGRAT  0.4*  0.4*      Cardiac Testing Lab Results   Component Value Date/Time     10/13/2014 06:52 PM    Troponin-I, Qt. <0.02 (L) 05/31/2018 03:36 PM      Coagulation Tests Lab Results   Component Value Date/Time    Prothrombin time 16.4 (H) 06/10/2018 03:40 PM    Prothrombin time 14.8 (H) 06/09/2018 01:24 PM    Prothrombin time 10.0 12/27/2015 10:20 AM    INR 1.4 06/10/2018 03:40 PM    INR 1.2 06/09/2018 01:24 PM    INR 1.0 12/27/2015 10:20 AM    aPTT 26.7 01/07/2015 02:00 PM      A1c Lab Results   Component Value Date/Time    Hemoglobin A1c 5.9 05/31/2018 03:36 PM    Hemoglobin A1c 6.3 (H) 04/12/2018 10:26 AM    Hemoglobin A1c 6.3 (H) 11/08/2017 08:38 AM      Lipid Panel Lab Results   Component Value Date/Time    Cholesterol, total 123 09/22/2016 11:10 AM    HDL Cholesterol 31 (L) 09/22/2016 11:10 AM    LDL, calculated 61 09/22/2016 11:10 AM    VLDL, calculated 31 09/22/2016 11:10 AM    Triglyceride 156 09/22/2016 11:10 AM    CHOL/HDL Ratio 4.0 09/22/2016 11:10 AM      Thyroid Panel Lab Results   Component Value Date/Time    TSH 0.005 (L) 05/31/2018 03:36 PM    TSH 0.007 (L) 10/14/2014 04:10 AM    T4, Total 15.4 (H) 10/14/2014 07:00 PM    T4, Free 1.59 04/12/2018 10:26 AM    T4, Free 1.89 (H) 11/08/2017 08:38 AM        Most Recent UA Lab Results   Component Value Date/Time    Color ORANGE 06/11/2018 04:06 AM    Appearance CLEAR 06/11/2018 04:06 AM    Specific gravity 1.022 06/11/2018 04:06 AM    pH (UA) 6.5 06/11/2018 04:06 AM    Protein TRACE (A) 06/11/2018 04:06 AM    Glucose NEGATIVE  06/11/2018 04:06 AM    Ketone NEGATIVE  06/11/2018 04:06 AM    Bilirubin LARGE (A) 06/11/2018 04:06 AM    Blood NEGATIVE  06/11/2018 04:06 AM    Urobilinogen 0.2 06/11/2018 04:06 AM    Nitrites NEGATIVE  06/11/2018 04:06 AM    Leukocyte Esterase TRACE (A) 06/11/2018 04:06 AM Allergies   Allergen Reactions    Geodon [Ziprasidone Hcl] Rash       There is no immunization history on file for this patient. All Labs from Last 24 Hrs:  Recent Results (from the past 24 hour(s))   GLUCOSE, POC    Collection Time: 06/13/18  3:58 PM   Result Value Ref Range    Glucose (POC) 123 (H) 65 - 100 mg/dL   GLUCOSE, POC    Collection Time: 06/13/18  8:50 PM   Result Value Ref Range    Glucose (POC) 122 (H) 65 - 100 mg/dL   GLUCOSE, POC    Collection Time: 06/14/18  7:06 AM   Result Value Ref Range    Glucose (POC) 108 (H) 65 - 100 mg/dL   GLUCOSE, POC    Collection Time: 06/14/18 10:43 AM   Result Value Ref Range    Glucose (POC) 105 (H) 65 - 100 mg/dL       Discharge Exam:  Patient Vitals for the past 24 hrs:   Temp Pulse Resp BP SpO2   06/14/18 1343 - (!) 102 18 177/86 95 %   06/14/18 1040 98.2 °F (36.8 °C) 96 20 158/77 98 %   06/14/18 0700 98.7 °F (37.1 °C) (!) 103 20 146/81 94 %   06/14/18 0423 99.1 °F (37.3 °C) 99 20 130/67 95 %   06/14/18 0101 97.9 °F (36.6 °C) (!) 103 18 157/74 94 %   06/13/18 1941 98.5 °F (36.9 °C) 94 18 167/87 95 %   06/13/18 1505 97.9 °F (36.6 °C) (!) 103 18 148/81 93 %     Oxygen Therapy  O2 Sat (%): 95 % (06/14/18 1343)  Pulse via Oximetry: 102 beats per minute (06/14/18 1343)  O2 Device: Room air (06/14/18 1343)  O2 Flow Rate (L/min): 2 l/min (06/12/18 1120)    Intake/Output Summary (Last 24 hours) at 06/14/18 1438  Last data filed at 06/14/18 1321   Gross per 24 hour   Intake              240 ml   Output             3250 ml   Net            -3010 ml       General:    Well nourished. Alert. Eyes:   Normal sclera. Extraocular movements intact. ENT:  Normocephalic, atraumatic. Moist mucous membranes  CV:   Regular rate and rhythm. No murmur, rub, or gallop. Lungs:  Clear to auscultation bilaterally. No wheezing, rhonchi, or rales. Abdomen: Soft, nontender, nondistended. Bowel sounds normal.   Extremities: Warm and dry. No cyanosis. +edema  Neurologic: CN II-XII grossly intact. Sensation intact. Skin:     No rashes or jaundice. Discharge Info:   Current Discharge Medication List      CONTINUE these medications which have CHANGED    Details   polyethylene glycol (MIRALAX) 17 gram/dose powder Take 17 g by mouth two (2) times a day. Qty: 1530 g, Refills: 2         CONTINUE these medications which have NOT CHANGED    Details   esomeprazole (NEXIUM) 40 mg capsule Take 1 cap by mouth twice daily  Indications: gastroesophageal reflux disease  Qty: 60 Cap, Refills: 0      levothyroxine (SYNTHROID) 200 mcg tablet Take 1 Tab by mouth Daily (before breakfast). Qty: 30 Tab, Refills: 0      ofloxacin (FLOXIN) 0.3 % otic solution Administer 5 Drops in left ear two (2) times a day. Qty: 5 mL, Refills: 6    Associated Diagnoses: Otorrhea of left ear      Calcium-Cholecalciferol, D3, (CALCIUM 600 WITH VITAMIN D3) 600 mg(1,500mg) -400 unit cap Take  by mouth. fludrocortisone (FLORINEF) 0.1 mg tablet Take 1 Tab by mouth daily. Qty: 90 Tab, Refills: 3      acetaminophen (TYLENOL) 500 mg tablet Take 1 Tab by mouth every six (6) hours as needed for Pain. MULTIVITAMIN PO Take  by mouth. risperiDONE (RISPERDAL) 2 mg tablet 1 mg four (4) times daily. cholecalciferol, VITAMIN D3, (VITAMIN D3) 5,000 unit tab tablet Take  by mouth daily. Desvenlafaxine SR (PRISTIQ) 50 mg tablet Take 50 mg by mouth. ALPRAZolam (XANAX) 1 mg tablet Take 1 mg by mouth three (3) times daily as needed for Anxiety. Cetirizine (ZYRTEC) 10 mg cap Take  by mouth daily as needed. Indications: ALLERGIC RHINITIS      QUEtiapine (SEROQUEL) 300 mg tablet Take 600 mg by mouth nightly. Indications: DELUSIONS               Disposition: home    Activity: Activity as tolerated  Diet: DIET NPO EXCEPT MEDS    Follow-up Appointments   Procedures    FOLLOW UP VISIT Appointment in: One Week Please schedule follow up with Dr. Krystyna Saunders to review pathology results. Please schedule follow up with Dr. Arjun Chacko to review pathology results. Standing Status:   Standing     Number of Occurrences:   1     Order Specific Question:   Appointment in     Answer: One Week    FOLLOW UP VISIT Appointment in: One Week Please schedule palliative care appointment with the oncology appointment in 1 week     Please schedule palliative care appointment with the oncology appointment in 1 week     Standing Status:   Standing     Number of Occurrences:   1     Order Specific Question:   Appointment in     Answer: One Week         Follow-up Information     Follow up With Details Comments Brandt Love 9, MD [place] working on [place], [employer] [id number] [facility] to see at same time as oncology           Time spent in patient discharge planning and coordination 35 minutes.     Signed:  Ronak Mckeon MD

## 2018-06-15 NOTE — PROGRESS NOTES
[facility] Hematology & Oncology        Inpatient Hematology / Oncology Progress Note      Admission Date: 2018 12:25 PM  Reason for Admission/Hospital Course: Elevated LFTs [R79.89]  Colon stricture ([address] [K56.699]  Colonic mass [K63.9]      24 Hour Events:  Sitting up in bedside chair  Mother at bedside  Appears down / depressed  Discharge Home      ROS:  Constitutional: negative for fever, chills, weakness, malaise, fatigue. CV: negative for chest pain, palpitations, edema. Respiratory:  negative for dyspnea, cough, wheezing. GI: negative for nausea, abdominal pain, diarrhea. 10 point review of systems is otherwise negative with the exception of the elements mentioned above in the HPI. Allergies   Allergen Reactions    Geodon [Ziprasidone Hcl] Rash       OBJECTIVE:  Patient Vitals for the past 8 hrs:   BP Temp Pulse Resp SpO2   06/15/18 0715 137/68 98.7 °F (37.1 °C) (!) 104 18 93 %     Temp (24hrs), Av.7 °F (37.1 °C), Min:98 °F (36.7 °C), Max:99.5 °F (37.5 °C)    06/15 0701 - 06/15 1900  In: 120 [P.O.:120]  Out: 100 [Urine:100]    Physical Exam:  Constitutional: Developmentally delayed female in no acute distress,sitting up in bedside chair   HEENT: Normocephalic and atraumatic. Oropharynx is clear, mucous membranes are moist. Sclera icteric. Neck supple    Skin Warm and dry. No bruising and no rash noted. + jaundice   Respiratory Lungs are clear to auscultation bilaterally without wheezes, rales or rhonchi, normal air exchange without accessory muscle use. CVS Normal rate, regular rhythm and normal S1 and S2. No murmurs, gallops, or rubs. Abdomen Soft, + mildly distended, no significant tenderness, normoactive bowel sounds. No palpable mass    Neuro Grossly nonfocal with no obvious sensory or motor deficits. MSK Normal range of motion in general.  No edema and no tenderness. Psych Appropriate mood and affect.           Labs:      Recent Labs      06/15/18   0318  18 0349   WBC   --   8.6   RBC   --   3.23*   HGB  9.0*  8.8*   HCT  27.8*  27.4*   MCV   --   84.8   MCH   --   27.2   MCHC   --   32.1   RDW   --   20.4*   PLT   --   431        Recent Labs      06/13/18   0349   NA  140   K  3.9   CL  103   CO2  27   AGAP  10   GLU  95   BUN  10   CREA  0.59*   GFRAA  >60   GFRNA  >60   CA  8.6   SGOT  142*   AP  737*   TP  7.0   ALB  2.0*   GLOB  5.0*   AGRAT  0.4*   MG  1.9         Imaging:      ASSESSMENT:    Problem List  Date Reviewed: 6/12/2018          Codes Class Noted    * (Principal)Metastatic colon cancer in female [facility]) ICD-10-CM: C78.5  ICD-9-CM: 197.5  6/10/2018        Elevated CEA ICD-10-CM: R97.0  ICD-9-CM: 795.81  6/10/2018        Cholestatic hepatitis ICD-10-CM: K75.89  ICD-9-CM: 573.8  6/10/2018        Obstructive jaundice due to cancer [facility]) ICD-10-CM: K83.1, C80.1  ICD-9-CM: 576.8  6/9/2018        Cholelithiasis ICD-10-CM: K80.20  ICD-9-CM: 574.20  6/1/2018        Gastric erosion ICD-10-CM: K25.9  ICD-9-CM: 535.40  6/1/2018        Gastroparesis (Chronic) ICD-10-CM: K31.84  ICD-9-CM: 536.3  6/1/2018        Cognitive developmental delay (Chronic) ICD-10-CM: F81.9  ICD-9-CM: 315.9  5/31/2018        Elevated liver function tests ICD-10-CM: R79.89  ICD-9-CM: 790.6  5/31/2018        Iron deficiency anemia due to chronic blood loss (Chronic) ICD-10-CM: D50.0  ICD-9-CM: 280.0  5/31/2018        Elevated blood pressure reading ICD-10-CM: R03.0  ICD-9-CM: 796.2  5/31/2018        Hypomagnesemia ICD-10-CM: E83.42  ICD-9-CM: 275.2  5/31/2018        Hyperglycemia (Chronic) ICD-10-CM: R73.9  ICD-9-CM: 790.29  5/31/2018        Obesity ICD-10-CM: E66.9  ICD-9-CM: 278.00  11/15/2017        Elevated blood sugar ICD-10-CM: R73.9  ICD-9-CM: 790.29  11/15/2017        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 Aide Padilla Psychiatry              Esophagitis ICD-10-CM: K20.9  ICD-9-CM: 530.10  5/4/2017 Overview Signed 5/5/2017  2:26 PM by Concepcion Ferguson MD     [id number] EGD showed healing esophagitis improved compared to prior EGD - plan continue omprezole              (ventriculoperitoneal) shunt status (Chronic) ICD-10-CM: B04.0  ICD-9-CM: V45.2  Unknown    Overview Signed 3/21/2017  3:57 PM by Concepcion Ferguson MD     not functioning based on 2016 shunt xray series             Secondary hypothyroidism ICD-10-CM: E03.8  ICD-9-CM: 244.8  3/2/2017        Vasovagal syncope ICD-10-CM: R55  ICD-9-CM: 780.2  3/2/2017        Paranoid schizophrenia ([facility] [address] (Chronic) ICD-10-CM: F20.0  ICD-9-CM: 295.30  Unknown        OCD (obsessive compulsive disorder) ICD-10-CM: F42.9  ICD-9-CM: 300.3  Unknown        Tachycardia (Chronic) ICD-10-CM: R00.0  ICD-9-CM: 785.0  11/8/2016        Intellectual disability (Chronic) ICD-10-CM: W90  ICD-9-CM: 657  Unknown        Hyperlipidemia (Chronic) ICD-10-CM: E78.5  ICD-9-CM: 272.4  9/22/2016        Chronic sinusitis ICD-10-CM: J32.9  ICD-9-CM: 473.9  9/22/2016        TIA (transient ischemic attack) ICD-10-CM: G45.9  ICD-9-CM: 435.9  10/13/2014        Hypothyroidism (Chronic) ICD-10-CM: E03.9  ICD-9-CM: 244.9  10/13/2014        Depression (Chronic) ICD-10-CM: F32.9  ICD-9-CM: 211  10/13/2014        GERD (gastroesophageal reflux disease) (Chronic) ICD-10-CM: K21.9  ICD-9-CM: 530.81  10/13/2014                PLAN:    Concern for metastatic disease/cholangiocarcinoma on imaging  - Will get CT CAP to further evaluate and complete staging. Follow up ERCP planned for tomorrow. If confirms maligancy, will need to send for next gen sequencing. If not enough tissue, may still need EUS. Discussed nature of metastatic cancer and ultimately poor prognosis.  If confirmed, Maureen Scale and family to have luis manuel discussion regarding goals of care and benefit of treatment for her  - CA 19-9 1316, CEA 25.8  6/13 SP Sigmoidoscopy-there is a circumferential mass unable to pass scope-mass is friable, ulcerated and consistent with malignancy-multiple biopsies obtained     Hyperbilirubinemia  - From above. ERCP with stent placement tomorrow  6/13 Stent placed-bili improving     Iron deficiency anemia  - Start replacements. Hgb adequate at 11.4   6/13 Hgb 8.8-continue to monitor     Hx paranoid schizophrenia  - Continue home meds      6/15/18:  Patient being discharged today by hospitalist.  She had colonoscopy with colon stent placed yesterday. Metastatic sigmoid colon cancer confirmed on biopsy. Per patient's mother she is  planning comfort measures only. She will follow-up with Dr. Cintia Salgado on 6/25/18  [id number]. [facility]  [address] [place] [address]  Office : [phone]  Fax : [phone]     Attending Addendum:  I personally evaluated the patient with VINICIO Pitts, and agree with the assessment, findings and plan as documented. Appears well, heart regular, lungs clear, abdomen benign. She is eager to be discharged. Although the parents are leaning to not pursue further therapy, they wish to follow up with Dr Cintia Salgado to go over pathology and treatment options if she were to be treated. Their request will be fulfilled and Ms Azam Nayak will be seen on 6/25/18.             MD [facility]  [address] [place] [address]  Office : [phone]  Fax : [phone]

## 2018-06-15 NOTE — PROGRESS NOTES
Assumed care of patient. Assessment completed and documented, see docflow. Patient resting quietly in bed, awakens to voice. Patient's mother at bedside. NAD noted at present. Respirations even and non labored on RA. Mother verbalized understanding to call for needs. Call light within reach. Will continue to monitor.

## 2018-06-15 NOTE — DISCHARGE INSTRUCTIONS
Abdominal Pain: Care Instructions  Your Care Instructions    Abdominal pain has many possible causes. Some aren't serious and get better on their own in a few days. Others need more testing and treatment. If your pain continues or gets worse, you need to be rechecked and may need more tests to find out what is wrong. You may need surgery to correct the problem. Don't ignore new symptoms, such as fever, nausea and vomiting, urination problems, pain that gets worse, and dizziness. These may be signs of a more serious problem. Your doctor may have recommended a follow-up visit in the next 8 to 12 hours. If you are not getting better, you may need more tests or treatment. The doctor has checked you carefully, but problems can develop later. If you notice any problems or new symptoms, get medical treatment right away. Follow-up care is a key part of your treatment and safety. Be sure to make and go to all appointments, and call your doctor if you are having problems. It's also a good idea to know your test results and keep a list of the medicines you take. How can you care for yourself at home? · Rest until you feel better. · To prevent dehydration, drink plenty of fluids, enough so that your urine is light yellow or clear like water. Choose water and other caffeine-free clear liquids until you feel better. If you have kidney, heart, or liver disease and have to limit fluids, talk with your doctor before you increase the amount of fluids you drink. · If your stomach is upset, eat mild foods, such as rice, dry toast or crackers, bananas, and applesauce. Try eating several small meals instead of two or three large ones. · Wait until 48 hours after all symptoms have gone away before you have spicy foods, alcohol, and drinks that contain caffeine. · Do not eat foods that are high in fat. · Avoid anti-inflammatory medicines such as aspirin, ibuprofen (Advil, Motrin), and naproxen (Aleve).  These can cause stomach upset. Talk to your doctor if you take daily aspirin for another health problem. When should you call for help? Call 911 anytime you think you may need emergency care. For example, call if:  ? · You passed out (lost consciousness). ? · You pass maroon or very bloody stools. ? · You vomit blood or what looks like coffee grounds. ? · You have new, severe belly pain. ?Call your doctor now or seek immediate medical care if:  ? · Your pain gets worse, especially if it becomes focused in one area of your belly. ? · You have a new or higher fever. ? · Your stools are black and look like tar, or they have streaks of blood. ? · You have unexpected vaginal bleeding. ? · You have symptoms of a urinary tract infection. These may include:  ¨ Pain when you urinate. ¨ Urinating more often than usual.  ¨ Blood in your urine. ? · You are dizzy or lightheaded, or you feel like you may faint. ? Watch closely for changes in your health, and be sure to contact your doctor if:  ? · You are not getting better after 1 day (24 hours). Where can you learn more? Go to http://jeni-hemalatha.info/. Enter B838 in the search box to learn more about \"Abdominal Pain: Care Instructions. \"  Current as of: March 20, 2017  Content Version: 11.4  © 4172-0181 [employer]. Care instructions adapted under license by [employer] (which disclaims liability or warranty for this information). If you have questions about a medical condition or this instruction, always ask your healthcare professional. [place] [address] any warranty or liability for your use of this information. Managing Side Effects of Chemotherapy: Care Instructions  Your Care Instructions    Cancer is often treated with medicines that destroy the cancer cells (chemotherapy). These medicines may slow cancer growth and prevent or stop the spread of cancer.  Chemotherapy also can affect healthy cells and cause side effects. Most people can work and do their normal activities after and even during chemotherapy, but they may need to limit their schedules. Side effects of chemotherapy may include nausea and vomiting, loss of appetite, pain, and being tired. Some medicines can cause diarrhea or mouth sores. Your doctor may prescribe medicines to treat the side effects. Your doctor will advise you to take extra care to prevent illnesses and infections, because chemotherapy weakens your natural defenses. Follow-up care is a key part of your treatment and safety. Be sure to make and go to all appointments, and call your doctor if you are having problems. It's also a good idea to know your test results and keep a list of the medicines you take. How can you care for yourself at home? Medicines  ? · Take your medicines exactly as prescribed. Call your doctor if you think you are having a problem with your medicine. You may get medicine for nausea and vomiting if you have these side effects. ?Nausea and vomiting  ? · A light meal or snack before chemotherapy may help prevent nausea. If you do have nausea during your treatment, try eating earlier-at least an hour or two before your next treatment. After your treatment, you may want to wait one or more hours before you eat again. ? · Drink fluids with your meals and an hour before or after meals. ? · After vomiting has stopped for 1 hour, sip a rehydration drink, such as Powerade or Gatorade. ? · Drink plenty of fluids to prevent dehydration. Choose water and other caffeine-free clear liquids until you feel better. Try clear fluids, such as apple or grape juice mixed to half strength with water, rehydration drinks, weak tea with sugar, clear broth, and gelatin dessert. Do not drink citrus juices. If you have kidney, heart, or liver disease and have to limit fluids, talk with your doctor before you increase the amount of fluids you drink.    ? · When you are feeling better, begin eating clear soups and mild foods until all symptoms are gone for 12 to 48 hours. Other good choices include dry toast, crackers, cooked cereal, and gelatin dessert, such as Jell-O.   ? · If your vomiting is not getting better or is getting worse, call your doctor right away. ? Loss of appetite  ? · It's important to eat healthy food. If you do not feel like eating, try to eat food that has protein and extra calories to keep up your strength and prevent weight loss. You can drink liquid meal replacements for extra calories and protein. ? · Try eating several smaller meals throughout the day. Set a schedule for meals and snacks, and plan for times when it feels best to eat. Try to eat your main meal early. ? · After treatment, you may want to wait for a while to eat. You can also try eating earlier before treatment. ? · Try to eat more of the foods you like during the days and times when your appetite is good. ? · When you don't feel like eating your normal foods, try clear broths/soups and mild foods like toast, crackers, cooked cereal like oatmeal, and gelatin dessert. Eating soft, bland foods may help. ?Pain control  ? · If your doctor prescribes medicines to control pain, take them as directed. Often your doctor will have you take these medicines regularly to keep your pain under control. Medicine for pain may cause side effects. Let your doctor know if you feel constipated, have trouble urinating, or have nausea. ? · Try using relaxation exercises to lower your anxiety and stress, which can increase pain. ? · Keep track of your pain so you can tell your doctor what your pain is like. Write down where you feel pain, how long it lasts, what seems to bring it on, and how it feels. Also note what makes the pain feel better or worse. ? · If you have mouth pain, your doctor may prescribe a special mouth rinse that can help relieve the pain. ?Weakness and feeling tired  ?  · Get extra rest. Plan ahead so you can take breaks or naps. ? · Save your energy for the most important things you want to do. ? · Try to get some exercise, such as walking, but stop if you are too tired. ? · Eat a balanced diet. Do not skip meals, especially breakfast.   ? · Do something you enjoy. Do you like to listen to music? Spend some time listening to your favorite music. Or find another way to relax by reading, watching a movie, or playing games. ? · Ask family and friends to help with home chores and other tasks. ? To prevent infections  ? · Wash your hands often during the day, especially before you eat and after you use the bathroom. ? · Stay away from people who have illnesses that you might catch, such as the flu or a cold. ? · Try to stay out of crowds. ? · Clean cuts and scrapes right away with warm water and soap. Clean them daily until they are healed. ? · Keep track of your temperature, if your doctor recommends it. You can do this by taking your temperature at regular times and writing it down. ? Hair loss  ? · Use a mild shampoo and a soft hair brush. ? · Use a low setting on your hair dryer. Do not color or perm your hair. ? · Have your hair cut short. It will look thicker and hawkins, and it will not be such a shock if you lose hair. ? · Use sunscreen and a hat, scarf, or turban to protect your scalp from the sun. ? · Ask your doctor about other treatments that you may try to prevent or minimize hair loss. These may include the use of a cooling cap. When should you call for help? Call 911 anytime you think you may need emergency care. For example, call if:  ? · You passed out (lost consciousness). ?Call your doctor now or seek immediate medical care if:  ? · You have a fever. ? · You have abnormal bleeding. ? · You have new or worse pain. ? · You think you have an infection. ? · You have new symptoms, such as a cough, belly pain, vomiting, diarrhea, or a rash. ?Watch closely for changes in your health, and be sure to contact your doctor if:  ? · You are much more tired than usual.   ? · You have swollen glands in your armpits, groin, or neck. ? · You do not get better as expected. Where can you learn more? Go to http://jeni-hmealatha.info/. Enter [phone] in the search box to learn more about \"Managing Side Effects of Chemotherapy: Care Instructions. \"  Current as of: May 12, 2017  Content Version: 11.4  © 2838-0850 [employer]. Care instructions adapted under license by [employer] (which disclaims liability or warranty for this information). If you have questions about a medical condition or this instruction, always ask your healthcare professional. [address] any warranty or liability for your use of this information. DISCHARGE SUMMARY from Nurse    PATIENT INSTRUCTIONS:    After general anesthesia or intravenous sedation, for 24 hours or while taking prescription Narcotics:  · Limit your activities  · Do not drive and operate hazardous machinery  · Do not make important personal or business decisions  · Do  not drink alcoholic beverages  · If you have not urinated within 8 hours after discharge, please contact your surgeon on call. Report the following to your surgeon:  · Excessive pain, swelling, redness or odor of or around the surgical area  · Temperature over 100.5  · Nausea and vomiting lasting longer than 4 hours or if unable to take medications  · Any signs of decreased circulation or nerve impairment to extremity: change in color, persistent  numbness, tingling, coldness or increase pain  · Any questions    What to do at Home:  Recommended activity: Activity as tolerated,     If you experience any of the following symptoms fever greater then 100.5, pain unrelieved by medication, increase in shortness breath, please follow up with primary care doctor.     *  Please give a list of your current medications to your Primary Care Provider. *  Please update this list whenever your medications are discontinued, doses are      changed, or new medications (including over-the-counter products) are added. *  Please carry medication information at all times in case of emergency situations. These are general instructions for a healthy lifestyle:    No smoking/ No tobacco products/ Avoid exposure to second hand smoke  Surgeon General's Warning:  Quitting smoking now greatly reduces serious risk to your health. Obesity, smoking, and sedentary lifestyle greatly increases your risk for illness    A healthy diet, regular physical exercise & weight monitoring are important for maintaining a healthy lifestyle    You may be retaining fluid if you have a history of heart failure or if you experience any of the following symptoms:  Weight gain of 3 pounds or more overnight or 5 pounds in a week, increased swelling in our hands or feet or shortness of breath while lying flat in bed. Please call your doctor as soon as you notice any of these symptoms; do not wait until your next office visit. Recognize signs and symptoms of STROKE:    F-face looks uneven    A-arms unable to move or move unevenly    S-speech slurred or non-existent    T-time-call 911 as soon as signs and symptoms begin-DO NOT go       Back to bed or wait to see if you get better-TIME IS BRAIN. Warning Signs of HEART ATTACK     Call 911 if you have these symptoms:   Chest discomfort. Most heart attacks involve discomfort in the center of the chest that lasts more than a few minutes, or that goes away and comes back. It can feel like uncomfortable pressure, squeezing, fullness, or pain.  Discomfort in other areas of the upper body. Symptoms can include pain or discomfort in one or both arms, the back, neck, jaw, or stomach.  Shortness of breath with or without chest discomfort.    Other signs may include breaking out in a cold sweat, nausea, or lightheadedness. Don't wait more than five minutes to call 911 - MINUTES MATTER! Fast action can save your life. Calling 911 is almost always the fastest way to get lifesaving treatment. Emergency Medical Services staff can begin treatment when they arrive -- up to an hour sooner than if someone gets to the hospital by car. The discharge information has been reviewed with the patient. The patient verbalized understanding. Discharge medications reviewed with the patient and appropriate educational materials and side effects teaching were provided.   ___________________________________________________________________________________________________________________________________

## 2018-06-15 NOTE — PROGRESS NOTES
END OF SHIFT NOTE:    Intake/Output      Voiding: YES  Catheter: NO  Drain:              Stool:  0 occurrences. Stool Assessment  Stool Color: Drucilla Roderick (06/14/18 2159)  Stool Appearance: Soft (06/14/18 2159)  Stool Amount: Large (06/14/18 2159)  Stool Source/Status: Rectum (06/14/18 2159)    Emesis:  0 occurrences. VITAL SIGNS  Patient Vitals for the past 12 hrs:   Temp Pulse Resp BP SpO2   06/15/18 0323 98.7 °F (37.1 °C) (!) 106 18 95/59 95 %   06/14/18 2354 99.5 °F (37.5 °C) (!) 113 18 132/68 90 %   06/14/18 1949 98.7 °F (37.1 °C) (!) 110 18 147/66 94 %   06/14/18 1800 98 °F (36.7 °C) (!) 108 18 134/63 95 %       Pain Assessment  Pain 1  Pain Scale 1: Visual (06/15/18 0302)  Pain Intensity 1: 0 (06/15/18 0302)  Patient Stated Pain Goal: 0 (06/15/18 0302)  Pain Reassessment 1: Patient sleeping (06/15/18 0302)  Pain Intervention(s) 1: Emotional support (06/14/18 1652)    Ambulating  Yes    Additional Information: Pt is alert. Mom at bedside all shift. Pt has not urinated this shift. Bladder scan revealed 800. MD paged and ordered an in and out catheter. When Nurse went to room, mother assisted pt to bathroom to try to urinate on her own. Pt was able to urinate two times before 7am. Shift report given to oncoming nurse at the bedside.     Marcio Arnold RN

## 2018-06-15 NOTE — PROGRESS NOTES
Gave discharge instructions to the pt's mother (caregiver) mother voices understanding. Sleeve placed over PICC Line. Primary nurse informed and aware.

## 2018-06-15 NOTE — PROGRESS NOTES
Went to give patient in and out catheter but mom insisted on patient trying to void again. Patient was able to void.

## 2018-06-15 NOTE — PROGRESS NOTES
[facility] Progress Note      Admit Date: 6/9/2018    CC: Metastatic colon cancer    Subjective:     Large BM last PM after stent placement, and 1 more since, with some blood present. Denies N/V this morning. Tolerating reg diet. C/o BLQ pain. Similar to pain from PTA. Medications:  Current Facility-Administered Medications   Medication Dose Route Frequency    polyethylene glycol (MIRALAX) packet 17 g  17 g Oral Q12H    ferric gluconate (FERRLECIT) 125 mg in 0.9% sodium chloride 100 mL ivpb  125 mg IntraVENous Q7D    sodium chloride (NS) flush 20 mL  20 mL InterCATHeter Q8H    heparin (porcine) pf 600 Units  600 Units InterCATHeter Q8H    sodium chloride (NS) flush 20 mL  20 mL InterCATHeter PRN    heparin (porcine) pf 600 Units  600 Units InterCATHeter PRN    acetaminophen (TYLENOL) tablet 650 mg  650 mg Oral Q6H PRN    ALPRAZolam (XANAX) tablet 1 mg  1 mg Oral TID PRN    calcium-vitamin D (OS-ESTRADA) 500 mg-200 unit tablet  1 Tab Oral DAILY    desvenlafaxine succinate (PRISTIQ) ER tablet 50 mg (Patient Supplied)  50 mg Oral DAILY    pantoprazole (PROTONIX) tablet 40 mg  40 mg Oral ACB&D    fludrocortisone (FLORINEF) tablet 100 mcg  0.1 mg Oral DAILY    levothyroxine (SYNTHROID) tablet 200 mcg  200 mcg Oral ACB    QUEtiapine (SEROquel) tablet 600 mg  600 mg Oral QHS    risperiDONE (RisperDAL) tablet 1 mg  1 mg Oral QID    sodium chloride (NS) flush 5-10 mL  5-10 mL IntraVENous Q8H    sodium chloride (NS) flush 5-10 mL  5-10 mL IntraVENous PRN    insulin lispro (HUMALOG) injection   SubCUTAneous AC&HS    heparin (porcine) injection 5,000 Units  5,000 Units SubCUTAneous Q12H       ROS: Otherwise negative in 10 systems except as noted above in Subjective.     Objective:   Vitals:  Visit Vitals    /68 (BP 1 Location: Left arm, BP Patient Position: At rest)    Pulse (!) 104    Temp 98.7 °F (37.1 °C)    Resp 18    Wt 72.9 kg (160 lb 12.8 oz)    SpO2 93%    Breastfeeding No    BMI 30.38 kg/m2       Intake/Output:  06/15 0701 - 06/15 1900  In: 120 [P.O.:120]  Out: 100 [Urine:100]  06/13 1901 - 06/15 0700  In: 600 [I.V.:600]  Out: 2350 [Urine:2350]    Exam:  General appearance: alert, no distress. L eye closed  Lungs: clear to auscultation bilaterally  Heart: regular rate and rhythm  Abdomen: soft, mild TTP BLQ. No G/R.  NABS   Extremities: no cyanosis + edema    Data Review (Labs):    Recent Results (from the past 24 hour(s))   GLUCOSE, POC    Collection Time: 06/14/18 10:43 AM   Result Value Ref Range    Glucose (POC) 105 (H) 65 - 100 mg/dL   GLUCOSE, POC    Collection Time: 06/14/18  5:48 PM   Result Value Ref Range    Glucose (POC) 102 (H) 65 - 100 mg/dL   GLUCOSE, POC    Collection Time: 06/14/18  9:32 PM   Result Value Ref Range    Glucose (POC) 125 (H) 65 - 100 mg/dL   HGB & HCT    Collection Time: 06/15/18  3:18 AM   Result Value Ref Range    HGB 9.0 (L) 11.7 - 15.4 g/dL    HCT 27.8 (L) 35.8 - 46.3 %   GLUCOSE, POC    Collection Time: 06/15/18  8:08 AM   Result Value Ref Range    Glucose (POC) 100 65 - 100 mg/dL     Flex Sig : friable mass at 19cm, nearly obstructing. Bx: + adenocarcinoma.    ERCP: proximal CHD stricture, s/p plastic stent  FLex Sig: Colonic stent placed      Assessment:     Principal Problem:    Metastatic colon cancer in female [facility]) (6/10/2018)    Active Problems:    Hypothyroidism (10/13/2014)      Depression (10/13/2014)      GERD (gastroesophageal reflux disease) (10/13/2014)      Hyperlipidemia (9/22/2016)      Intellectual disability ()      Paranoid schizophrenia (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 Metastatic sigmoid colon cancer- confirmed on biopsy. 2. Per mom, planning comfort measures only. 3. Successful stent placement yesterday, with mult BM's since  4. Cont Miralax bid. Better to have loose stools than constipation. 5. Low residue diet  6. Trial of bentyl or tylenol for mild abdominal painl    Biliary brushings confirm metastatic adenocarcinoma. Will not schedule repeat ERCP for stent change based on comfort measures only. Can be done as needed. Very limited prognosis without treatment. Follow up with GI PRN. Planning Palliative care eval next week. Pls call if further issues arise.     Dearkevin Liang MD

## 2018-06-22 NOTE — PROGRESS NOTES
Pt arrived to room 709; PICC line removed by Mehnaz Keith RN. 36cm of catheter removed. Antibiotic ointment with gauze applied to site and secured with tegaderm. Pt tolerated well.

## 2018-06-27 NOTE — PROGRESS NOTES
Downtime progress note entry for [facility] : Dominique Garrison RN    Care  Follow up Outreach Note   Outreach type: Follow up  Patient name: Darrel Gamez  : 78  MRN:    Date/Time of Outreach: 18     Reason for follow-up:   Continuation of care   Disease specific complaints/issues: Bile duct obstruction       Patient progress towards goals set from last contact:   pt request for nurse not to call. this will be nurses last phone outreach. Has patient attended any PCP or specialist follow-up appointments since last contact? What was outcome of appointment? When is next follow-up scheduled? Review medications. Any medication changes since last outreach? Does patient have any questions or issues related to their medications? Home health active? If yes - any issue? Progress? Referrals needed?  (SW, Diabetes education, HH, etc. )    Other issues/Miscellaneous?  (Transportation, access to meals, ability to perform ADLs, adequate caregiver support, etc.)              Next Outreach Scheduled: Week of the 2nd     Next Steps/Goals:      Care  completing call: Dominique Garrison

## 2018-07-10 NOTE — ED PROVIDER NOTES
HPI Comments: Mother states pain in the left side of abdomen onset Sunday. No vomiting. No fever. Decreased appetite and po intake. Last BM was Sunday. Soft and formed. History of recently diagnosed adenocarcinoma of the sigmoid colon with metastatic disease. Had a stent placed in colon for obstruction. No surgery planned. Looking into immunotherapy. History of brain cancer at age 2 with radiation and chemo. Mentally handicapped. Patient is a 44 y.o. female presenting with abdominal pain. The history is provided by the patient, a relative and medical records (mother). Abdominal Pain    This is a recurrent problem. Episode onset: Sunday. The problem has not changed since onset. The pain is located in the LUQ and LLQ. The pain is mild. Associated symptoms include constipation and back pain. Pertinent negatives include no fever, no nausea, no vomiting, no dysuria and no frequency. Nothing worsens the pain. The pain is relieved by nothing. Past workup includes CT scan.         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 Mojica [facility]     Paranoid type delusional disorder ([facility] [address]     from chemo as child- had brain tumor 1984    Stroke ([facility] [address] 10-12-14    TIA; taking aspirin daily; pt released from neurologist care per mother    Tachycardia 11/8/2016    TIA (transient ischemic attack) 10/13/2014    Vasovagal syncope 3/2/2017     (ventriculoperitoneal) shunt status     not functioning based on 2016 shunt xray series       Past Surgical History:   Procedure Laterality Date    COLONOSCOPY N/A 6/14/2018    COLONOSCOPY with Stent placement  performed by Brendon Herbert MD at [address] N/A 6/12/2018    [address] / BMI= 28 / ROOM 515 performed by Brendon Herbert MD at [address] HX CRANIOTOMY  2007    benign brain tumor    HX CSF SHUNT  1984    HX HEENT      tubes in ears    HX MYRINGOTOMY Bilateral     HX OTHER SURGICAL  1984    port placement for chemo    HX OTHER SURGICAL Left      SPHENOID BALLOON SINUPLASTY     HX OTHER SURGICAL      MENINGIOMA REMOVE     HX OTHER SURGICAL       SHUNT, CHEMO CATH         Family History:   Problem Relation Age of Onset    Diabetes Father     Hypertension Father    [employer] Gout Father     Arthritis-osteo Father     Osteoporosis Mother     Arthritis-osteo Mother     Allergic Rhinitis Mother        Social History     Social History    Marital status: SINGLE     Spouse name: N/A    Number of children: N/A    Years of education: N/A     Occupational History    Not on file. Social History Main Topics    Smoking status: Never Smoker    Smokeless tobacco: Never Used    Alcohol use No    Drug use: No    Sexual activity: Not Currently     Other Topics Concern    Seat Belt Yes     Social History Narrative    Lives at home with Mom and Dad         ALLERGIES: Geodon [ziprasidone hcl]    Review of Systems   Unable to perform ROS: Mental status change   Constitutional: Negative for fever. Gastrointestinal: Positive for abdominal pain and constipation. Negative for nausea and vomiting. Genitourinary: Negative for dysuria and frequency. Musculoskeletal: Positive for back pain. Vitals:    07/10/18 1539   BP: 119/71   Pulse: (!) 116   Resp: 16   Temp: 98 °F (36.7 °C)   SpO2: 97%   Weight: 66.2 kg (146 lb)   Height: 5' 1\" (1.549 m)            Physical Exam   Constitutional: She appears well-developed and well-nourished. HENT:   MM dry   Eyes: Scleral icterus is present. Pale conjunctiva   Neck: Normal range of motion. Neck supple. Cardiovascular: Normal rate, regular rhythm, normal heart sounds and intact distal pulses. Pulmonary/Chest: Effort normal and breath sounds normal.   Abdominal: Soft. Bowel sounds are normal. She exhibits no distension and no mass. There is tenderness (LlLQ and LUQ). There is no rebound and no guarding. Genitourinary:   Genitourinary Comments: Rectal - no impaction, minimal soft light brown stool on the glove is heme positive. Musculoskeletal: She exhibits no edema. Neurological: She is alert. Skin: Skin is warm and dry. Psychiatric: She has a normal mood and affect. Nursing note and vitals reviewed. MDM  Number of Diagnoses or Management Options  Malignant neoplasm of sigmoid colon [facility]):         ED Course       Procedures    Colon cancer  Abdominal pain  Labs reviewed  CT abdomen ordered  Signed to Dr. Roswell Bumpers for results and disposition         8:34 PM      CT scan of abdomen and pelvis shows increase in liver metastasis. Stent and sigmoid colon with no focal abnormality noted. Free fluid in pelvis, no localized fluid collection suggestive of abscess. No other acute abnormalities noted    Case discussed with GI on call. Will discharge patient home.   Close follow-up in her office

## 2018-07-10 NOTE — ED TRIAGE NOTES
Per family the pt has left sided abdominal pain and constipation since Sunday. Pt has recent diagnosis of colon ca that has gone to her liver. No vomiting or nausea.

## 2018-07-11 NOTE — ED NOTES
I have reviewed discharge instructions with the patient. The patient verbalized understanding. Patient left ED via Discharge Method: wheelchair to Home with mom    Opportunity for questions and clarification provided. Patient given 0 scripts. To continue your aftercare when you leave the hospital, you may receive an automated call from our care team to check in on how you are doing. This is a free service and part of our promise to provide the best care and service to meet your aftercare needs.  If you have questions, or wish to unsubscribe from this service please call [phone]. Thank you for Choosing our [facility] Emergency Department.

## 2018-07-11 NOTE — DISCHARGE INSTRUCTIONS
Colon Cancer: Care Instructions  Your Care Instructions    Colon cancer occurs when abnormal cells grow out of control in the lower part of your intestine (your colon). If the tumor was small and had not spread, your doctor may have removed it during the colonoscopy. But you may need surgery to remove the cancer if the tumor was too big or had spread too far to be removed during a colonoscopy. If cancer has spread to another part of your body, such as the liver, you may need more far-reaching surgery. Treatment for colon cancer may also include radiation therapy and medicines that destroy cancer cells (chemotherapy). Being treated for cancer can weaken your body, and you may feel very tired. Get the rest your body needs so that you can feel better. When you find out that you have cancer, you may feel many emotions and may need some help coping. Seek out family, friends, and counselors for support. You also can do things at home to make yourself feel better while you go through treatment. Call the [employer] ([phone]) or visit its website at [facility] [employer] for more information. Follow-up care is a key part of your treatment and safety. Be sure to make and go to all appointments, and call your doctor if you are having problems. It's also a good idea to know your test results and keep a list of the medicines you take. How can you care for yourself at home? · Take your medicines exactly as prescribed. Call your doctor if you think you are having a problem with your medicine. You may get medicine for nausea and vomiting if you have these side effects. · Follow your doctor's instructions to relieve pain. Pain from cancer and surgery can almost always be controlled. Use pain medicine when you first notice pain, before it becomes severe. · Eat healthy food. If you do not feel like eating, try to eat food that has protein and extra calories to keep up your strength and prevent weight loss. Drink liquid meal replacements for extra calories and protein. Try to eat your main meal early. · Get some physical activity every day, but do not get too tired. Keep doing the hobbies you enjoy as your energy allows. · Take steps to control your stress and workload. Learn relaxation techniques. ¨ Share your feelings. Stress and tension affect our emotions. By expressing your feelings to others, you may be able to understand and cope with them. ¨ Consider joining a support group. Talking about a problem with your spouse, a good friend, or other people with similar problems is a good way to reduce tension and stress. ¨ Express yourself through art. Try writing, dance, art, or crafts to relieve stress. Some dance, writing, or art groups may be available just for people who have cancer. ¨ Be kind to your body and mind. Getting enough sleep, eating a healthy diet, and taking time to do things you enjoy can contribute to an overall feeling of balance in your life and help reduce stress. ¨ Get help if you need it. Discuss your concerns with your doctor or counselor. · If you are vomiting or have diarrhea:  ¨ Drink plenty of fluids (enough so that your urine is light yellow or clear like water) to prevent dehydration. Choose water and other caffeine-free clear liquids. If you have kidney, heart, or liver disease and have to limit fluids, talk with your doctor before you increase the amount of fluids you drink. ¨ When you are able to eat, try clear soups, mild foods, and liquids until all symptoms are gone for 12 to 48 hours. Other good choices include dry toast, crackers, cooked cereal, and gelatin dessert, such as Jell-O.  · If you have not already done so, prepare a list of advance directives. Advance directives are instructions to your doctor and family members about what kind of care you want if you become unable to speak or express yourself. When should you call for help?   Call 911 anytime you think you may need emergency care. For example, call if:    · You pass maroon or very bloody stools.     · You passed out (lost consciousness).    Call your doctor now or seek immediate medical care if:    · You have a fever.     · You are vomiting.     · You have new or worse belly pain.     · You cannot pass stools or gas.     · You have new or more blood in your stool.    Watch closely for changes in your health, and be sure to contact your doctor if:    · You are losing weight.     · You have new or worse symptoms.     · You do not get better as expected. Where can you learn more? Go to http://jeni-hemalatha.info/. Enter Z198 in the search box to learn more about \"Colon Cancer: Care Instructions. \"  Current as of: May 12, 2017  Content Version: 11.7  © 7451-7201 [employer]. Care instructions adapted under license by [employer] (which disclaims liability or warranty for this information). If you have questions about a medical condition or this instruction, always ask your healthcare professional. [address] any warranty or liability for your use of this information.

## 2018-09-24 ENCOUNTER — HOME CARE VISIT (OUTPATIENT)
Dept: HOSPICE | Facility: HOSPICE | Age: 38
End: 2018-09-24
Payer: MEDICARE

## 2018-09-24 VITALS
TEMPERATURE: 102 F | RESPIRATION RATE: 25 BRPM | DIASTOLIC BLOOD PRESSURE: 48 MMHG | HEART RATE: 108 BPM | SYSTOLIC BLOOD PRESSURE: 100 MMHG

## 2018-09-25 ENCOUNTER — HOME CARE VISIT (OUTPATIENT)
Dept: HOSPICE | Facility: HOSPICE | Age: 38
End: 2018-09-25
Payer: MEDICARE

## 2018-09-27 ENCOUNTER — HOME CARE VISIT (OUTPATIENT)
Dept: HOSPICE | Facility: HOSPICE | Age: 38
End: 2018-09-27
Payer: MEDICARE

## 2019-01-06 NOTE — DISCHARGE INSTRUCTIONS
Disposition: home    Activity: Activity as tolerated  Diet: DIET GI SOFT No options chosen-low fat           Follow-up Appointments   Procedures    FOLLOW UP VISIT Appointment in: 3 - 5 Days [facility] gastroparesis followup thanks       [facility] gastroparesis followup thanks       Standing Status:   Standing       Number of Occurrences:   1       Order Specific Question:   Appointment in       Answer:   3 - 5 Days        [facility]: 16

## (undated) DEVICE — CANNULA NSL ORAL AD FOR CAPNOFLEX CO2 O2 AIRLFE

## (undated) DEVICE — KENDALL RADIOLUCENT FOAM MONITORING ELECTRODE RECTANGULAR SHAPE: Brand: KENDALL

## (undated) DEVICE — CONNECTOR TBNG OD5-7MM O2 END DISP

## (undated) DEVICE — GUIDEWIRE ENDOSCP L260CM DIA0.035IN STD BILI HYDRPHLC EXT

## (undated) DEVICE — BALLOON DILATATION CATHETER: Brand: HURRICANE™ RX

## (undated) DEVICE — BLOCK BITE AD 60FR W/ VELC STRP ADDRESSES MOST PT AND

## (undated) DEVICE — GUIDEWIRE ENDOSCP L450CM DIA0.035IN S STL HYDR JAGWIRE

## (undated) DEVICE — ADULT SPO2 SENSOR: Brand: NELLCOR

## (undated) DEVICE — DEVICE LCK BILI RAP EXCHG OLPS --

## (undated) DEVICE — 1200 GUARD II KIT W/5MM TUBE W/O VAC TUBE: Brand: GUARDIAN

## (undated) DEVICE — (D)SYR 10ML 1/5ML GRAD NSAF -- PKGING CHANGE USE ITEM 338027

## (undated) DEVICE — FORCEPS BX L240CM JAW DIA2.8MM L CAP W/ NDL MIC MESH TOOTH

## (undated) DEVICE — WIREGUIDED CYTOLOGY BRUSH: Brand: RX CYTOLOGY BRUSH

## (undated) DEVICE — NDL PRT INJ NSAF BLNT 18GX1.5 --

## (undated) DEVICE — RETRIEVAL BALLOON CATHETER: Brand: EXTRACTOR™ PRO RX

## (undated) DEVICE — REM POLYHESIVE ADULT PATIENT RETURN ELECTRODE: Brand: VALLEYLAB

## (undated) DEVICE — SYR 50ML SLIP TIP NSAF LF STRL --

## (undated) DEVICE — SPHINCTEROTOME: Brand: JAGTOME RX 44

## (undated) DEVICE — KENDALL SCD EXPRESS SLEEVES, KNEE LENGTH, MEDIUM: Brand: KENDALL SCD

## (undated) DEVICE — SYR 3ML LL TIP 1/10ML GRAD --

## (undated) DEVICE — CONTAINER PREFIL FRMLN 40ML --

## (undated) DEVICE — SYR 5ML 1/5 GRAD LL NSAF LF --